# Patient Record
Sex: MALE | Race: WHITE | Employment: UNEMPLOYED | URBAN - METROPOLITAN AREA
[De-identification: names, ages, dates, MRNs, and addresses within clinical notes are randomized per-mention and may not be internally consistent; named-entity substitution may affect disease eponyms.]

---

## 2019-05-07 ENCOUNTER — OFFICE VISIT (OUTPATIENT)
Dept: INTERNAL MEDICINE CLINIC | Age: 49
End: 2019-05-07
Payer: COMMERCIAL

## 2019-05-07 VITALS
HEART RATE: 92 BPM | DIASTOLIC BLOOD PRESSURE: 86 MMHG | HEIGHT: 66 IN | SYSTOLIC BLOOD PRESSURE: 136 MMHG | WEIGHT: 200 LBS | BODY MASS INDEX: 32.14 KG/M2

## 2019-05-07 DIAGNOSIS — Z23 NEED FOR SHINGLES VACCINE: ICD-10-CM

## 2019-05-07 DIAGNOSIS — Z00.00 ANNUAL PHYSICAL EXAM: ICD-10-CM

## 2019-05-07 DIAGNOSIS — Z11.4 SCREENING FOR HIV (HUMAN IMMUNODEFICIENCY VIRUS): ICD-10-CM

## 2019-05-07 DIAGNOSIS — R20.0 NUMBNESS AND TINGLING IN LEFT HAND: ICD-10-CM

## 2019-05-07 DIAGNOSIS — Z76.89 ENCOUNTER TO ESTABLISH CARE: Primary | ICD-10-CM

## 2019-05-07 DIAGNOSIS — E55.9 VITAMIN D DEFICIENCY: ICD-10-CM

## 2019-05-07 DIAGNOSIS — F17.290 OTHER TOBACCO PRODUCT NICOTINE DEPENDENCE, UNCOMPLICATED: ICD-10-CM

## 2019-05-07 DIAGNOSIS — Z12.11 COLON CANCER SCREENING: ICD-10-CM

## 2019-05-07 DIAGNOSIS — R73.09 ELEVATED HEMOGLOBIN A1C: ICD-10-CM

## 2019-05-07 DIAGNOSIS — Z23 NEED FOR 23-POLYVALENT PNEUMOCOCCAL POLYSACCHARIDE VACCINE: ICD-10-CM

## 2019-05-07 DIAGNOSIS — R20.2 NUMBNESS AND TINGLING IN LEFT HAND: ICD-10-CM

## 2019-05-07 DIAGNOSIS — J44.9 CHRONIC OBSTRUCTIVE PULMONARY DISEASE, UNSPECIFIED COPD TYPE (HCC): ICD-10-CM

## 2019-05-07 DIAGNOSIS — J45.909 UNCOMPLICATED ASTHMA, UNSPECIFIED ASTHMA SEVERITY, UNSPECIFIED WHETHER PERSISTENT: ICD-10-CM

## 2019-05-07 LAB
A/G RATIO: 1.7 (ref 1.1–2.2)
ALBUMIN SERPL-MCNC: 4.7 G/DL (ref 3.4–5)
ALP BLD-CCNC: 90 U/L (ref 40–129)
ALT SERPL-CCNC: 25 U/L (ref 10–40)
ANION GAP SERPL CALCULATED.3IONS-SCNC: 13 MMOL/L (ref 3–16)
AST SERPL-CCNC: 21 U/L (ref 15–37)
BASOPHILS ABSOLUTE: 0.1 K/UL (ref 0–0.2)
BASOPHILS RELATIVE PERCENT: 0.9 %
BILIRUB SERPL-MCNC: 0.3 MG/DL (ref 0–1)
BUN BLDV-MCNC: 14 MG/DL (ref 7–20)
CALCIUM SERPL-MCNC: 10 MG/DL (ref 8.3–10.6)
CHLORIDE BLD-SCNC: 101 MMOL/L (ref 99–110)
CHOLESTEROL, TOTAL: 216 MG/DL (ref 0–199)
CO2: 27 MMOL/L (ref 21–32)
CREAT SERPL-MCNC: 1.3 MG/DL (ref 0.9–1.3)
EOSINOPHILS ABSOLUTE: 0.2 K/UL (ref 0–0.6)
EOSINOPHILS RELATIVE PERCENT: 2.3 %
GFR AFRICAN AMERICAN: >60
GFR NON-AFRICAN AMERICAN: 58
GLOBULIN: 2.8 G/DL
GLUCOSE BLD-MCNC: 104 MG/DL (ref 70–99)
HCT VFR BLD CALC: 48.6 % (ref 40.5–52.5)
HDLC SERPL-MCNC: 44 MG/DL (ref 40–60)
HEMOGLOBIN: 16.3 G/DL (ref 13.5–17.5)
LDL CHOLESTEROL CALCULATED: 129 MG/DL
LYMPHOCYTES ABSOLUTE: 2.1 K/UL (ref 1–5.1)
LYMPHOCYTES RELATIVE PERCENT: 24.6 %
MCH RBC QN AUTO: 32.5 PG (ref 26–34)
MCHC RBC AUTO-ENTMCNC: 33.4 G/DL (ref 31–36)
MCV RBC AUTO: 97.2 FL (ref 80–100)
MONOCYTES ABSOLUTE: 1.1 K/UL (ref 0–1.3)
MONOCYTES RELATIVE PERCENT: 12.8 %
NEUTROPHILS ABSOLUTE: 5 K/UL (ref 1.7–7.7)
NEUTROPHILS RELATIVE PERCENT: 59.4 %
PDW BLD-RTO: 13.8 % (ref 12.4–15.4)
PLATELET # BLD: 333 K/UL (ref 135–450)
PMV BLD AUTO: 9 FL (ref 5–10.5)
POTASSIUM SERPL-SCNC: 4.8 MMOL/L (ref 3.5–5.1)
RBC # BLD: 5 M/UL (ref 4.2–5.9)
SODIUM BLD-SCNC: 141 MMOL/L (ref 136–145)
TOTAL PROTEIN: 7.5 G/DL (ref 6.4–8.2)
TRIGL SERPL-MCNC: 214 MG/DL (ref 0–150)
TSH REFLEX: 1.04 UIU/ML (ref 0.27–4.2)
VLDLC SERPL CALC-MCNC: 43 MG/DL
WBC # BLD: 8.4 K/UL (ref 4–11)

## 2019-05-07 PROCEDURE — 90732 PPSV23 VACC 2 YRS+ SUBQ/IM: CPT | Performed by: NURSE PRACTITIONER

## 2019-05-07 PROCEDURE — 90750 HZV VACC RECOMBINANT IM: CPT | Performed by: NURSE PRACTITIONER

## 2019-05-07 PROCEDURE — 90472 IMMUNIZATION ADMIN EACH ADD: CPT | Performed by: NURSE PRACTITIONER

## 2019-05-07 PROCEDURE — 90471 IMMUNIZATION ADMIN: CPT | Performed by: NURSE PRACTITIONER

## 2019-05-07 PROCEDURE — 99386 PREV VISIT NEW AGE 40-64: CPT | Performed by: NURSE PRACTITIONER

## 2019-05-07 ASSESSMENT — PATIENT HEALTH QUESTIONNAIRE - PHQ9
SUM OF ALL RESPONSES TO PHQ QUESTIONS 1-9: 0
SUM OF ALL RESPONSES TO PHQ QUESTIONS 1-9: 0
2. FEELING DOWN, DEPRESSED OR HOPELESS: 0
1. LITTLE INTEREST OR PLEASURE IN DOING THINGS: 0
SUM OF ALL RESPONSES TO PHQ9 QUESTIONS 1 & 2: 0

## 2019-05-07 NOTE — PROGRESS NOTES
SUBJECTIVE:    Patient ID: Ada Mcneil is a 46 y.o. male. CC: New patient appointment, COPD    HPI: The patient presents to the office to establish with a new primary care provider. Previous PCP Dr. Gerard Woo  Last seen about 4-5 years ago. Diagnosed in the past with COPD. He was using inhalers, albuterol nebulizer and handheld. He states he was never on maintenance medication for COPD. Also diagnosed with asthma. He reports history of stroke but this is self-diagnosed. Had episode of dizziness. Had episode of difficulty speaking. Both occurred about 2 years ago. No deficits. Current has left #4-5 finger numbness  Numbness only in fingers. No history of neck issues. He awakened with numbness, no injury or trauma. According to medical records, has low vit D in past at 10 in 2010. A1c 6.5 in 2010. He is unemployed. He is single. He has 2 children. Smoking cigars. Had quit smoking for 4 years. No alcohol use. No illicit drug use. Past Medical History:   Diagnosis Date    Asthma     COPD (chronic obstructive pulmonary disease) ([facility] [address]         History reviewed. No pertinent surgical history.     Family History   Problem Relation Age of Onset    Heart Attack Father        Social History     Socioeconomic History    Marital status: Single     Spouse name: Not on file    Number of children: Not on file    Years of education: Not on file    Highest education level: Not on file   Occupational History    Not on file   Social Needs    Financial resource strain: Not on file    Food insecurity:     Worry: Not on file     Inability: Not on file    Transportation needs:     Medical: Not on file     Non-medical: Not on file   Tobacco Use    Smoking status: Current Every Day Smoker     Packs/day: 0.10     Types: Cigars    Smokeless tobacco: Never Used   Substance and Sexual Activity    Alcohol use: Not Currently    Drug use: Never    Sexual activity: Not Currently   Lifestyle    Physical activity:     Days per week: Not on file     Minutes per session: Not on file    Stress: Not on file   Relationships    Social connections:     Talks on phone: Not on file     Gets together: Not on file     Attends [employer] service: Not on file     Active member of club or organization: Not on file     Attends meetings of clubs or organizations: Not on file     Relationship status: Not on file    Intimate partner violence:     Fear of current or ex partner: Not on file     Emotionally abused: Not on file     Physically abused: Not on file     Forced sexual activity: Not on file   Other Topics Concern    Not on file   Social History Narrative    Not on file       No current outpatient medications on file prior to visit. No current facility-administered medications on file prior to visit. No Known Allergies         Review of Systems   Constitutional: Negative. Respiratory: Positive for choking and shortness of breath. Cardiovascular: Negative. Gastrointestinal: Negative. Genitourinary: Negative. Musculoskeletal: Negative. Skin: Negative. Neurological: Positive for numbness. Psychiatric/Behavioral: Negative. All other systems reviewed and are negative. OBJECTIVE:  Physical Exam   Constitutional: He is oriented to person, place, and time. He appears well-developed and well-nourished. No distress. HENT:   Head: Normocephalic and atraumatic. Eyes: Conjunctivae are normal. No scleral icterus. Neck: Neck supple. No JVD present. Cardiovascular: Normal rate and regular rhythm. Pulmonary/Chest: Effort normal and breath sounds normal. No respiratory distress. He has no wheezes. He has no rales. Abdominal: Soft. He exhibits no distension. There is no tenderness. There is no rebound and no guarding. Musculoskeletal: Normal range of motion. He exhibits no edema. Neurological: He is alert and oriented to person, place, and time. Skin: Skin is warm and dry. He is not diaphoretic. Psychiatric: He has a normal mood and affect. His behavior is normal. Thought content normal.        /86   Pulse 92   Ht 5' 6\" (1.676 m)   Wt 200 lb (90.7 kg)   BMI 32.28 kg/m²        PHQ Scores 5/7/2019   PHQ2 Score 0   PHQ9 Score 0     Interpretation of Total Score DepressionSeverity: 1-4 = Minimal depression, 5-9 = Mild depression, 10-14 = Moderate depression, 15-19 = Moderately severe depression, 20-27 = Severe depression         ASSESSMENT/PLAN:  Lanie Marie was seen today for established new doctor.   Diagnoses and all orders for this visit:    Encounter to establish care  - H&P reviewed and scanned into EMR  - Oriented to provider & practice    Annual physical exam  -     Comprehensive Metabolic Panel  -     CBC WITH AUTO DIFFERENTIAL  -     LIPID PANEL  -     Hemoglobin A1C    Elevated hemoglobin A1c  -     Hemoglobin A1C    Vitamin D deficiency  -     Vitamin D 25 Hydroxy    Other tobacco product nicotine dependence, uncomplicated  -     Full PFT Study With Bronchodilator    Uncomplicated asthma, unspecified asthma severity, unspecified whether persistent  -     Full PFT Study With Bronchodilator    Chronic obstructive pulmonary disease, unspecified COPD type ([facility] [address]  -     Full PFT Study With Bronchodilator    Screening for HIV (human immunodeficiency virus)  -     Cancel: HIV Screen    Colon cancer screening  -     COLONOSCOPY (Screening)    Numbness and tingling in left hand  -     TSH with Reflex  -     VITAMIN B12 & FOLATE    Need for 23-polyvalent pneumococcal polysaccharide vaccine  -     PNEUMOVAX 23 subcutaneous/IM (Pneumococcal polysaccharide vaccine 23-valent >= 1yo)    Need for shingles vaccine  -     Zoster recombinant [facility])    Other orders  -     MISC ARUP 1        ORLANDO Hoyt - CNP

## 2019-05-08 DIAGNOSIS — E78.5 DYSLIPIDEMIA: Primary | ICD-10-CM

## 2019-05-08 DIAGNOSIS — E55.9 VITAMIN D DEFICIENCY: ICD-10-CM

## 2019-05-08 LAB
ESTIMATED AVERAGE GLUCOSE: 114 MG/DL
FOLATE: 11.98 NG/ML (ref 4.78–24.2)
HBA1C MFR BLD: 5.6 %
VITAMIN B-12: 286 PG/ML (ref 211–911)
VITAMIN D 25-HYDROXY: 22.4 NG/ML

## 2019-05-08 RX ORDER — MELATONIN
1000 DAILY
Qty: 30 TABLET | Refills: 5 | Status: SHIPPED | OUTPATIENT
Start: 2019-05-08 | End: 2019-08-06 | Stop reason: SDUPTHER

## 2019-05-08 RX ORDER — ATORVASTATIN CALCIUM 20 MG/1
20 TABLET, FILM COATED ORAL NIGHTLY
Qty: 30 TABLET | Refills: 5 | Status: SHIPPED | OUTPATIENT
Start: 2019-05-08 | End: 2020-01-16

## 2019-05-09 LAB — MISCELLANEOUS LAB TEST ORDER: NORMAL

## 2019-05-09 ASSESSMENT — ENCOUNTER SYMPTOMS
GASTROINTESTINAL NEGATIVE: 1
CHOKING: 1
SHORTNESS OF BREATH: 1

## 2019-05-10 ENCOUNTER — HOSPITAL ENCOUNTER (OUTPATIENT)
Dept: PULMONOLOGY | Age: 49
Discharge: HOME | End: 2019-05-10
Payer: COMMERCIAL

## 2019-05-10 VITALS — OXYGEN SATURATION: 92 %

## 2019-05-10 PROCEDURE — 94729 DIFFUSING CAPACITY: CPT

## 2019-05-10 PROCEDURE — 94010 BREATHING CAPACITY TEST: CPT

## 2019-05-10 PROCEDURE — 6370000000 HC RX 637 (ALT 250 FOR IP): Performed by: NURSE PRACTITIONER

## 2019-05-10 PROCEDURE — 94760 N-INVAS EAR/PLS OXIMETRY 1: CPT

## 2019-05-10 PROCEDURE — 94060 EVALUATION OF WHEEZING: CPT

## 2019-05-10 PROCEDURE — 94200 LUNG FUNCTION TEST (MBC/MVV): CPT

## 2019-05-10 PROCEDURE — 94664 DEMO&/EVAL PT USE INHALER: CPT

## 2019-05-10 PROCEDURE — 94726 PLETHYSMOGRAPHY LUNG VOLUMES: CPT

## 2019-05-10 RX ORDER — ALBUTEROL SULFATE 90 UG/1
4 AEROSOL, METERED RESPIRATORY (INHALATION) ONCE
Status: COMPLETED | OUTPATIENT
Start: 2019-05-10 | End: 2019-05-10

## 2019-05-10 RX ADMIN — Medication 4 PUFF: at 14:12

## 2019-05-13 NOTE — PROCEDURES
Pulmonary Function Testing      Patient name:  Jacqueline Dela Cruz     [facility] Unit #:   [id number]   Date of test:  5/10/2019  Date of interpretation:   5/13/2019    Mr. Jacqueline Dela Cruz is a 46y.o. year-old current smoker. The spirometry data were acceptable and reproducible. Spirometry:  Flow volume loops were obstructed. The FEV-1/FVC ratio was decreased. The FEV-1 was 0.89 liters (25% of predicted), which was very severely decreased. The FVC was 2.07 liters (45% of predicted), which was decreased. Response to inhaled bronchodilators (albuterol) was significant. Lung volumes:  Lung volumes were tested by plethysmography. The total lung capacity was 6.71 liters (110% of predicted), which was normal. The residual volume was 3.68 liters (194% of predicted), which was increased. The ratio of residual volume to total lung capacity (RV/TLC) was 58, which was increased. Diffusion capacity was found to be severely decreased. Interpretation:  Very severe obstructive airway disease with significant bronchodilator reversibility.     Comments:

## 2019-05-15 DIAGNOSIS — J44.9 CHRONIC OBSTRUCTIVE PULMONARY DISEASE, UNSPECIFIED COPD TYPE (HCC): Primary | ICD-10-CM

## 2019-05-15 DIAGNOSIS — J45.50 SEVERE PERSISTENT ASTHMA WITHOUT COMPLICATION: ICD-10-CM

## 2019-05-15 RX ORDER — FLUTICASONE FUROATE AND VILANTEROL 100; 25 UG/1; UG/1
1 POWDER RESPIRATORY (INHALATION) DAILY
Qty: 30 EACH | Refills: 5 | Status: SHIPPED | OUTPATIENT
Start: 2019-05-15 | End: 2019-11-05 | Stop reason: SDUPTHER

## 2019-08-06 ENCOUNTER — OFFICE VISIT (OUTPATIENT)
Dept: INTERNAL MEDICINE CLINIC | Age: 49
End: 2019-08-06
Payer: COMMERCIAL

## 2019-08-06 VITALS
HEIGHT: 66 IN | BODY MASS INDEX: 32.3 KG/M2 | SYSTOLIC BLOOD PRESSURE: 114 MMHG | DIASTOLIC BLOOD PRESSURE: 72 MMHG | WEIGHT: 201 LBS | HEART RATE: 84 BPM

## 2019-08-06 DIAGNOSIS — E55.9 VITAMIN D DEFICIENCY: ICD-10-CM

## 2019-08-06 DIAGNOSIS — J44.9 CHRONIC OBSTRUCTIVE PULMONARY DISEASE, UNSPECIFIED COPD TYPE (HCC): Primary | ICD-10-CM

## 2019-08-06 DIAGNOSIS — Z23 NEED FOR SHINGLES VACCINE: ICD-10-CM

## 2019-08-06 DIAGNOSIS — E78.5 DYSLIPIDEMIA: ICD-10-CM

## 2019-08-06 PROCEDURE — G8417 CALC BMI ABV UP PARAM F/U: HCPCS | Performed by: NURSE PRACTITIONER

## 2019-08-06 PROCEDURE — 4004F PT TOBACCO SCREEN RCVD TLK: CPT | Performed by: NURSE PRACTITIONER

## 2019-08-06 PROCEDURE — 90750 HZV VACC RECOMBINANT IM: CPT | Performed by: NURSE PRACTITIONER

## 2019-08-06 PROCEDURE — 3017F COLORECTAL CA SCREEN DOC REV: CPT | Performed by: NURSE PRACTITIONER

## 2019-08-06 PROCEDURE — G8926 SPIRO NO PERF OR DOC: HCPCS | Performed by: NURSE PRACTITIONER

## 2019-08-06 PROCEDURE — 90471 IMMUNIZATION ADMIN: CPT | Performed by: NURSE PRACTITIONER

## 2019-08-06 PROCEDURE — 3023F SPIROM DOC REV: CPT | Performed by: NURSE PRACTITIONER

## 2019-08-06 PROCEDURE — G8427 DOCREV CUR MEDS BY ELIG CLIN: HCPCS | Performed by: NURSE PRACTITIONER

## 2019-08-06 PROCEDURE — 99214 OFFICE O/P EST MOD 30 MIN: CPT | Performed by: NURSE PRACTITIONER

## 2019-08-06 RX ORDER — MELATONIN
2000 DAILY
Qty: 60 TABLET | Refills: 5 | Status: SHIPPED | OUTPATIENT
Start: 2019-08-06 | End: 2020-01-16

## 2019-08-08 ASSESSMENT — ENCOUNTER SYMPTOMS
RESPIRATORY NEGATIVE: 1
GASTROINTESTINAL NEGATIVE: 1

## 2019-11-05 DIAGNOSIS — J44.9 CHRONIC OBSTRUCTIVE PULMONARY DISEASE, UNSPECIFIED COPD TYPE (HCC): ICD-10-CM

## 2019-11-05 DIAGNOSIS — J45.50 SEVERE PERSISTENT ASTHMA WITHOUT COMPLICATION: ICD-10-CM

## 2019-11-05 RX ORDER — FLUTICASONE FUROATE AND VILANTEROL TRIFENATATE 100; 25 UG/1; UG/1
POWDER RESPIRATORY (INHALATION)
Qty: 60 EACH | Refills: 5 | Status: SHIPPED | OUTPATIENT
Start: 2019-11-05 | End: 2020-03-02 | Stop reason: SDUPTHER

## 2019-11-28 DIAGNOSIS — E55.9 VITAMIN D DEFICIENCY: ICD-10-CM

## 2019-12-02 RX ORDER — MELATONIN
Qty: 30 TABLET | Refills: 5 | Status: SHIPPED | OUTPATIENT
Start: 2019-12-02 | End: 2020-01-13 | Stop reason: DRUGHIGH

## 2020-01-13 ENCOUNTER — OFFICE VISIT (OUTPATIENT)
Dept: INTERNAL MEDICINE CLINIC | Age: 50
End: 2020-01-13
Payer: COMMERCIAL

## 2020-01-13 VITALS
BODY MASS INDEX: 32.78 KG/M2 | HEART RATE: 96 BPM | SYSTOLIC BLOOD PRESSURE: 138 MMHG | DIASTOLIC BLOOD PRESSURE: 82 MMHG | WEIGHT: 204 LBS | HEIGHT: 66 IN

## 2020-01-13 PROCEDURE — G8484 FLU IMMUNIZE NO ADMIN: HCPCS | Performed by: NURSE PRACTITIONER

## 2020-01-13 PROCEDURE — 99396 PREV VISIT EST AGE 40-64: CPT | Performed by: NURSE PRACTITIONER

## 2020-01-13 RX ORDER — BUDESONIDE AND FORMOTEROL FUMARATE DIHYDRATE 160; 4.5 UG/1; UG/1
2 AEROSOL RESPIRATORY (INHALATION) 2 TIMES DAILY
Qty: 3 INHALER | Refills: 3 | Status: SHIPPED | OUTPATIENT
Start: 2020-01-13 | End: 2020-03-02 | Stop reason: ALTCHOICE

## 2020-01-13 SDOH — ECONOMIC STABILITY: TRANSPORTATION INSECURITY
IN THE PAST 12 MONTHS, HAS THE LACK OF TRANSPORTATION KEPT YOU FROM MEDICAL APPOINTMENTS OR FROM GETTING MEDICATIONS?: NO

## 2020-01-13 SDOH — ECONOMIC STABILITY: TRANSPORTATION INSECURITY
IN THE PAST 12 MONTHS, HAS LACK OF TRANSPORTATION KEPT YOU FROM MEETINGS, WORK, OR FROM GETTING THINGS NEEDED FOR DAILY LIVING?: NO

## 2020-01-13 SDOH — ECONOMIC STABILITY: FOOD INSECURITY: WITHIN THE PAST 12 MONTHS, THE FOOD YOU BOUGHT JUST DIDN'T LAST AND YOU DIDN'T HAVE MONEY TO GET MORE.: NEVER TRUE

## 2020-01-13 SDOH — ECONOMIC STABILITY: INCOME INSECURITY: HOW HARD IS IT FOR YOU TO PAY FOR THE VERY BASICS LIKE FOOD, HOUSING, MEDICAL CARE, AND HEATING?: NOT HARD AT ALL

## 2020-01-13 SDOH — ECONOMIC STABILITY: FOOD INSECURITY: WITHIN THE PAST 12 MONTHS, YOU WORRIED THAT YOUR FOOD WOULD RUN OUT BEFORE YOU GOT MONEY TO BUY MORE.: NEVER TRUE

## 2020-01-13 ASSESSMENT — PATIENT HEALTH QUESTIONNAIRE - PHQ9
SUM OF ALL RESPONSES TO PHQ9 QUESTIONS 1 & 2: 0
2. FEELING DOWN, DEPRESSED OR HOPELESS: 0
1. LITTLE INTEREST OR PLEASURE IN DOING THINGS: 0
SUM OF ALL RESPONSES TO PHQ QUESTIONS 1-9: 0
SUM OF ALL RESPONSES TO PHQ QUESTIONS 1-9: 0

## 2020-01-13 NOTE — PROGRESS NOTES
SUBJECTIVE:    Patient ID: Soy Felipe is a 44 y.o. male. CC: Annual physical, COPD, hyperlipidemia, impaired fasting glucose, vitamin D deficiency    HPI: The patient presents to the office today for annual physical and for follow-up of chronic medical conditions. He has been having episodes of indigestion. Indigestion does not seem to be associated with eating. He denies any overt chest pain. He denies exertional component. There is no associated shortness of breath. Fatigue: He has been experiencing fatigue of late. He denies any known cause. He feels he is sleeping well. COPD: Since starting medication, he feels this is better controlled. Unfortunately, [facility] is no longer covered under his plan. We discussed switching him to Symbicort. Hyperlipidemia: He has a history of hyperlipidemia. He denies chest pain. Impaired fasting glucose: He has a history of well-controlled diabetes back in 2010 with an A1c of 6.5. More recently, his A1c was 5.6 last year. He has a history of vitamin D deficiency. He has been taking vitamin D repletion. Past Medical History:   Diagnosis Date    Asthma     COPD (chronic obstructive pulmonary disease) ([facility])         Current Outpatient Medications   Medication Sig Dispense Refill    BREO ELLIPTA 100-25 MCG/INH AEPB inhaler TAKE 1 PUFF BY MOUTH EVERY DAY 60 each 5    Cholecalciferol (VITAMIN D3) 1000 units TABS Take 2 tablets by mouth daily 60 tablet 5    atorvastatin (LIPITOR) 20 MG tablet Take 1 tablet by mouth nightly 30 tablet 5     No current facility-administered medications for this visit. Review of Systems   Constitutional: Positive for fatigue. Negative for chills and fever. Respiratory: Negative for shortness of breath. Cardiovascular: Negative for chest pain. Gastrointestinal: Negative. Indigestion   Genitourinary: Negative. Musculoskeletal: Negative. Skin: Negative. Neurological: Negative. OBJECTIVE:  Physical Exam  Vitals signs reviewed. Constitutional:       General: He is not in acute distress. Appearance: He is well-developed. He is not diaphoretic. HENT:      Head: Normocephalic and atraumatic. Eyes:      General: No scleral icterus. Conjunctiva/sclera: Conjunctivae normal.   Neck:      Musculoskeletal: Neck supple. Vascular: No JVD. Cardiovascular:      Rate and Rhythm: Normal rate and regular rhythm. Pulmonary:      Effort: Pulmonary effort is normal. No respiratory distress. Breath sounds: Normal breath sounds. No wheezing or rales. Abdominal:      General: There is no distension. Palpations: Abdomen is soft. Tenderness: There is no tenderness. There is no guarding or rebound. Musculoskeletal: Normal range of motion. Skin:     General: Skin is warm and dry. Neurological:      Mental Status: He is alert and oriented to person, place, and time. Psychiatric:         Behavior: Behavior normal.         Thought Content: Thought content normal.        /82   Pulse 96   Ht 5' 6\" (1.676 m)   Wt 204 lb (92.5 kg)   BMI 32.93 kg/m²      PHQ Scores 1/13/2020 5/7/2019   PHQ2 Score 0 0   PHQ9 Score 0 0     Interpretation of Total Score Depression Severity: 1-4 = Minimal depression, 5-9 = Mild depression, 10-14 = Moderate depression, 15-19 = Moderately severe depression, 20-27 =Severe depression        ASSESSMENT/PLAN:  Nimesh Kemp was seen today for gastroesophageal reflux. Diagnoses and all orders for this visit:    Annual physical exam  -     Comprehensive Metabolic Panel; Future  -     Hemoglobin A1C; Future  -     LIPID PANEL; Future  -     Vitamin D 25 Hydroxy; Future  -     CBC WITH AUTO DIFFERENTIAL; Future    Indigestion  - Recent episode of what he describes as indigestion which was not explained by food. Given history of hyperlipidemia and tobacco abuse, will screen for coronary disease with troponin and EKG. He denies overt chest pain.

## 2020-01-14 ENCOUNTER — HOSPITAL ENCOUNTER (OUTPATIENT)
Age: 50
Discharge: HOME | End: 2020-01-14
Payer: COMMERCIAL

## 2020-01-14 DIAGNOSIS — E78.5 DYSLIPIDEMIA: ICD-10-CM

## 2020-01-14 DIAGNOSIS — K30 INDIGESTION: ICD-10-CM

## 2020-01-14 DIAGNOSIS — R53.83 FATIGUE, UNSPECIFIED TYPE: ICD-10-CM

## 2020-01-14 DIAGNOSIS — E55.9 VITAMIN D DEFICIENCY: ICD-10-CM

## 2020-01-14 DIAGNOSIS — Z11.4 SCREENING FOR HIV (HUMAN IMMUNODEFICIENCY VIRUS): ICD-10-CM

## 2020-01-14 DIAGNOSIS — Z00.00 ANNUAL PHYSICAL EXAM: ICD-10-CM

## 2020-01-14 PROBLEM — F17.200 NICOTINE DEPENDENCE: Status: ACTIVE | Noted: 2020-01-14

## 2020-01-14 PROBLEM — R73.01 IMPAIRED FASTING GLUCOSE: Status: ACTIVE | Noted: 2020-01-14

## 2020-01-14 PROBLEM — J44.9 CHRONIC OBSTRUCTIVE PULMONARY DISEASE (HCC): Status: ACTIVE | Noted: 2020-01-14

## 2020-01-14 LAB
A/G RATIO: 1.6 (ref 1.1–2.2)
ALBUMIN SERPL-MCNC: 4.6 G/DL (ref 3.4–5)
ALP BLD-CCNC: 90 U/L (ref 40–129)
ALT SERPL-CCNC: 28 U/L (ref 10–40)
ANION GAP SERPL CALCULATED.3IONS-SCNC: 17 MMOL/L (ref 3–16)
AST SERPL-CCNC: 24 U/L (ref 15–37)
BASOPHILS ABSOLUTE: 0.1 K/UL (ref 0–0.2)
BASOPHILS RELATIVE PERCENT: 1.3 %
BILIRUB SERPL-MCNC: 0.5 MG/DL (ref 0–1)
BUN BLDV-MCNC: 10 MG/DL (ref 7–20)
CALCIUM SERPL-MCNC: 10.2 MG/DL (ref 8.3–10.6)
CHLORIDE BLD-SCNC: 99 MMOL/L (ref 99–110)
CHOLESTEROL, TOTAL: 216 MG/DL (ref 0–199)
CO2: 25 MMOL/L (ref 21–32)
CREAT SERPL-MCNC: 1.2 MG/DL (ref 0.9–1.3)
EKG ATRIAL RATE: 79 BPM
EKG DIAGNOSIS: NORMAL
EKG P AXIS: 78 DEGREES
EKG P-R INTERVAL: 166 MS
EKG Q-T INTERVAL: 346 MS
EKG QRS DURATION: 76 MS
EKG QTC CALCULATION (BAZETT): 396 MS
EKG R AXIS: 38 DEGREES
EKG T AXIS: 206 DEGREES
EKG VENTRICULAR RATE: 79 BPM
EOSINOPHILS ABSOLUTE: 0.2 K/UL (ref 0–0.6)
EOSINOPHILS RELATIVE PERCENT: 2.2 %
GFR AFRICAN AMERICAN: >60
GFR NON-AFRICAN AMERICAN: >60
GLOBULIN: 2.9 G/DL
GLUCOSE BLD-MCNC: 104 MG/DL (ref 70–99)
HCT VFR BLD CALC: 52.3 % (ref 40.5–52.5)
HDLC SERPL-MCNC: 48 MG/DL (ref 40–60)
HEMOGLOBIN: 17.5 G/DL (ref 13.5–17.5)
LDL CHOLESTEROL CALCULATED: 133 MG/DL
LYMPHOCYTES ABSOLUTE: 2.5 K/UL (ref 1–5.1)
LYMPHOCYTES RELATIVE PERCENT: 28.1 %
MCH RBC QN AUTO: 31.4 PG (ref 26–34)
MCHC RBC AUTO-ENTMCNC: 33.4 G/DL (ref 31–36)
MCV RBC AUTO: 94.1 FL (ref 80–100)
MONOCYTES ABSOLUTE: 0.9 K/UL (ref 0–1.3)
MONOCYTES RELATIVE PERCENT: 10.7 %
NEUTROPHILS ABSOLUTE: 5.1 K/UL (ref 1.7–7.7)
NEUTROPHILS RELATIVE PERCENT: 57.7 %
PDW BLD-RTO: 14.3 % (ref 12.4–15.4)
PLATELET # BLD: 378 K/UL (ref 135–450)
PMV BLD AUTO: 8.6 FL (ref 5–10.5)
POTASSIUM SERPL-SCNC: 4.9 MMOL/L (ref 3.5–5.1)
RBC # BLD: 5.56 M/UL (ref 4.2–5.9)
SODIUM BLD-SCNC: 141 MMOL/L (ref 136–145)
TOTAL PROTEIN: 7.5 G/DL (ref 6.4–8.2)
TRIGL SERPL-MCNC: 173 MG/DL (ref 0–150)
TROPONIN: <0.01 NG/ML
VITAMIN D 25-HYDROXY: 27.8 NG/ML
VLDLC SERPL CALC-MCNC: 35 MG/DL
WBC # BLD: 8.8 K/UL (ref 4–11)

## 2020-01-14 PROCEDURE — 93010 ELECTROCARDIOGRAM REPORT: CPT | Performed by: INTERNAL MEDICINE

## 2020-01-14 PROCEDURE — 93005 ELECTROCARDIOGRAM TRACING: CPT | Performed by: NURSE PRACTITIONER

## 2020-01-14 ASSESSMENT — ENCOUNTER SYMPTOMS
GASTROINTESTINAL NEGATIVE: 1
SHORTNESS OF BREATH: 0

## 2020-01-15 LAB
ESTIMATED AVERAGE GLUCOSE: 122.6 MG/DL
HBA1C MFR BLD: 5.9 %
HIV AG/AB: NORMAL
HIV ANTIGEN: NORMAL
HIV-1 ANTIBODY: NORMAL
HIV-2 AB: NORMAL

## 2020-01-16 LAB
CONTROL: NORMAL
HEMOCCULT STL QL: NORMAL

## 2020-01-16 PROCEDURE — 82274 ASSAY TEST FOR BLOOD FECAL: CPT | Performed by: NURSE PRACTITIONER

## 2020-01-16 RX ORDER — ATORVASTATIN CALCIUM 40 MG/1
40 TABLET, FILM COATED ORAL NIGHTLY
Qty: 30 TABLET | Refills: 5 | Status: SHIPPED | OUTPATIENT
Start: 2020-01-16 | End: 2020-07-20

## 2020-01-16 RX ORDER — MELATONIN
3000 DAILY
Qty: 90 TABLET | Refills: 5 | Status: SHIPPED | OUTPATIENT
Start: 2020-01-16 | End: 2020-06-15

## 2020-02-04 ENCOUNTER — OFFICE VISIT (OUTPATIENT)
Dept: INTERNAL MEDICINE CLINIC | Age: 50
End: 2020-02-04
Payer: COMMERCIAL

## 2020-02-04 VITALS
HEIGHT: 67 IN | BODY MASS INDEX: 31.08 KG/M2 | WEIGHT: 198 LBS | DIASTOLIC BLOOD PRESSURE: 86 MMHG | HEART RATE: 90 BPM | SYSTOLIC BLOOD PRESSURE: 140 MMHG

## 2020-02-04 PROCEDURE — 3017F COLORECTAL CA SCREEN DOC REV: CPT | Performed by: NURSE PRACTITIONER

## 2020-02-04 PROCEDURE — 4004F PT TOBACCO SCREEN RCVD TLK: CPT | Performed by: NURSE PRACTITIONER

## 2020-02-04 PROCEDURE — 99214 OFFICE O/P EST MOD 30 MIN: CPT | Performed by: NURSE PRACTITIONER

## 2020-02-04 PROCEDURE — G8484 FLU IMMUNIZE NO ADMIN: HCPCS | Performed by: NURSE PRACTITIONER

## 2020-02-04 PROCEDURE — G8417 CALC BMI ABV UP PARAM F/U: HCPCS | Performed by: NURSE PRACTITIONER

## 2020-02-04 PROCEDURE — G8926 SPIRO NO PERF OR DOC: HCPCS | Performed by: NURSE PRACTITIONER

## 2020-02-04 PROCEDURE — G8427 DOCREV CUR MEDS BY ELIG CLIN: HCPCS | Performed by: NURSE PRACTITIONER

## 2020-02-04 PROCEDURE — 3023F SPIROM DOC REV: CPT | Performed by: NURSE PRACTITIONER

## 2020-02-04 RX ORDER — PANTOPRAZOLE SODIUM 40 MG/1
40 TABLET, DELAYED RELEASE ORAL DAILY
Qty: 30 TABLET | Refills: 2 | Status: SHIPPED | OUTPATIENT
Start: 2020-02-04 | End: 2020-04-27

## 2020-02-05 ASSESSMENT — ENCOUNTER SYMPTOMS: RESPIRATORY NEGATIVE: 1

## 2020-02-05 NOTE — PROGRESS NOTES
SUBJECTIVE:    Patient ID: Marcelino Royal is a 44 y.o. male. CC: Indigestion, COPD, hyperlipidemia prediabetes    HPI: The patient presents to the office today for follow-up of chronic medical conditions. Indigestion: Patient previously reported indigestion which did not seem to be associated with eating. He now feels this may be related to food. Worse when he lays down. We discussed GERD. He continues to have atypical chest pain which we have discussed may be related to above. There is no associated overt chest pain or exertional symptoms. .  He would like to proceed with cardiac stress testing. COPD: Patient felt better when he was taking Breo. Unfortunately this was no longer covered by his plan so we switched him to Symbicort. He is frustrated because he is now been told Symbicort is not covered either. He denies any recent exacerbation. Hyperlipidemia: He denies any chest pain or shortness of breath. He was started on a statin for increased ASCVD risk. Prediabetes: He has a history of prediabetes. This remains stable with his most recent A1c 5.9. Past Medical History:   Diagnosis Date    Asthma     COPD [facility]chronic obstructive pulmonary disease) ([facility]         Current Outpatient Medications   Medication Sig Dispense Refill    pantoprazole (PROTONIX) 40 MG tablet Take 1 tablet by mouth daily 30 tablet 2    Cholecalciferol (VITAMIN D3) 25 MCG (1000 UT) TABS Take 3 tablets by mouth daily 90 tablet 5    atorvastatin (LIPITOR) 40 MG tablet Take 1 tablet by mouth nightly 30 tablet 5    budesonide-formoterol (SYMBICORT) 160-4.5 MCG/ACT AERO Inhale 2 puffs into the lungs 2 times daily 3 Inhaler 3    BREO ELLIPTA 100-25 MCG/INH AEPB inhaler TAKE 1 PUFF BY MOUTH EVERY DAY 60 each 5     No current facility-administered medications for this visit. Review of Systems   Constitutional: Negative. Respiratory: Negative. Cardiovascular: Positive for chest pain. improved on statin  -Continue current regimen    Vitamin D deficiency  -History of vitamin D deficiency  -Continue repletion and monitoring    Chronic obstructive pulmonary disease, unspecified COPD type ([facility] [address]  -See HPI  -We will await contact from insurance or pharmacy if Symbicort is not covered    Impaired fasting glucose  -Prediabetes remains stable  -Monitor      Alondra Cruz, APRN - CNP

## 2020-03-02 ENCOUNTER — NURSE TRIAGE (OUTPATIENT)
Dept: OTHER | Facility: CLINIC | Age: 50
End: 2020-03-02

## 2020-03-02 ENCOUNTER — OFFICE VISIT (OUTPATIENT)
Dept: INTERNAL MEDICINE CLINIC | Age: 50
End: 2020-03-02
Payer: COMMERCIAL

## 2020-03-02 VITALS
DIASTOLIC BLOOD PRESSURE: 64 MMHG | SYSTOLIC BLOOD PRESSURE: 136 MMHG | HEART RATE: 86 BPM | WEIGHT: 204 LBS | BODY MASS INDEX: 31.76 KG/M2

## 2020-03-02 PROCEDURE — G8427 DOCREV CUR MEDS BY ELIG CLIN: HCPCS | Performed by: NURSE PRACTITIONER

## 2020-03-02 PROCEDURE — 3017F COLORECTAL CA SCREEN DOC REV: CPT | Performed by: NURSE PRACTITIONER

## 2020-03-02 PROCEDURE — 4004F PT TOBACCO SCREEN RCVD TLK: CPT | Performed by: NURSE PRACTITIONER

## 2020-03-02 PROCEDURE — G8417 CALC BMI ABV UP PARAM F/U: HCPCS | Performed by: NURSE PRACTITIONER

## 2020-03-02 PROCEDURE — G8926 SPIRO NO PERF OR DOC: HCPCS | Performed by: NURSE PRACTITIONER

## 2020-03-02 PROCEDURE — G8484 FLU IMMUNIZE NO ADMIN: HCPCS | Performed by: NURSE PRACTITIONER

## 2020-03-02 PROCEDURE — 99213 OFFICE O/P EST LOW 20 MIN: CPT | Performed by: NURSE PRACTITIONER

## 2020-03-02 PROCEDURE — 3023F SPIROM DOC REV: CPT | Performed by: NURSE PRACTITIONER

## 2020-03-02 RX ORDER — ALBUTEROL SULFATE 90 UG/1
2 AEROSOL, METERED RESPIRATORY (INHALATION) EVERY 4 HOURS PRN
Qty: 1 INHALER | Refills: 5 | Status: SHIPPED | OUTPATIENT
Start: 2020-03-02 | End: 2020-10-15

## 2020-03-02 RX ORDER — FLUTICASONE FUROATE AND VILANTEROL 100; 25 UG/1; UG/1
1 POWDER RESPIRATORY (INHALATION) DAILY
Qty: 90 EACH | Refills: 3 | Status: SHIPPED | OUTPATIENT
Start: 2020-03-02 | End: 2020-09-01 | Stop reason: SDUPTHER

## 2020-03-02 NOTE — PROGRESS NOTES
is warm and dry. Neurological:      General: No focal deficit present. Mental Status: He is alert and oriented to person, place, and time. Psychiatric:         Mood and Affect: Mood normal.         Behavior: Behavior normal.        /64   Pulse 86   Wt 204 lb (92.5 kg)   BMI 31.76 kg/m²      PHQ Scores 1/13/2020 5/7/2019   PHQ2 Score 0 0   PHQ9 Score 0 0     Interpretation of Total Score Depression Severity: 1-4 = Minimal depression, 5-9 = Mild depression, 10-14 = Moderate depression, 15-19 = Moderately severe depression, 20-27 =Severe depression        ASSESSMENT/PLAN:  Domenico Houston was seen today for other. Diagnoses and all orders for this visit:    Chronic obstructive pulmonary disease, unspecified COPD type ([facility] [address]  -     fluticasone-vilanterol (BREO ELLIPTA) 100-25 MCG/INH AEPB inhaler; Inhale 1 puff into the lungs daily  -     albuterol sulfate HFA (PROVENTIL HFA) 108 (90 Base) MCG/ACT inhaler; Inhale 2 puffs into the lungs every 4 hours as needed for Wheezing or Shortness of Breath    Severe persistent asthma without complication  -     fluticasone-vilanterol (BREO ELLIPTA) 100-25 MCG/INH AEPB inhaler; Inhale 1 puff into the lungs daily  -     albuterol sulfate HFA (PROVENTIL HFA) 108 (90 Base) MCG/ACT inhaler; Inhale 2 puffs into the lungs every 4 hours as needed for Wheezing or Shortness of Breath    -See HPI  -Back to Breo given poor control on Symbicort and insurance company changing formulary again.       Willie Chanel, APRN - CNP

## 2020-03-03 ASSESSMENT — ENCOUNTER SYMPTOMS
SHORTNESS OF BREATH: 1
CHEST TIGHTNESS: 1
WHEEZING: 1

## 2020-04-27 RX ORDER — PANTOPRAZOLE SODIUM 40 MG/1
TABLET, DELAYED RELEASE ORAL
Qty: 30 TABLET | Refills: 2 | Status: SHIPPED | OUTPATIENT
Start: 2020-04-27 | End: 2020-08-03

## 2020-06-15 RX ORDER — UBIDECARENONE 100 MG
CAPSULE ORAL
Qty: 30 CAPSULE | Refills: 5 | Status: ON HOLD | OUTPATIENT
Start: 2020-06-15 | End: 2022-03-19 | Stop reason: ALTCHOICE

## 2020-06-25 ENCOUNTER — TELEPHONE (OUTPATIENT)
Dept: ADMINISTRATIVE | Age: 50
End: 2020-06-25

## 2020-07-20 RX ORDER — ATORVASTATIN CALCIUM 40 MG/1
TABLET, FILM COATED ORAL
Qty: 30 TABLET | Refills: 5 | Status: SHIPPED | OUTPATIENT
Start: 2020-07-20

## 2020-08-03 RX ORDER — PANTOPRAZOLE SODIUM 40 MG/1
TABLET, DELAYED RELEASE ORAL
Qty: 30 TABLET | Refills: 5 | Status: SHIPPED | OUTPATIENT
Start: 2020-08-03 | End: 2021-07-08 | Stop reason: ALTCHOICE

## 2020-09-01 ENCOUNTER — TELEPHONE (OUTPATIENT)
Dept: INTERNAL MEDICINE CLINIC | Age: 50
End: 2020-09-01

## 2020-09-01 RX ORDER — FLUTICASONE FUROATE AND VILANTEROL TRIFENATATE 100; 25 UG/1; UG/1
1 POWDER RESPIRATORY (INHALATION) DAILY
Qty: 3 EACH | Refills: 2 | Status: SHIPPED | OUTPATIENT
Start: 2020-09-01 | End: 2021-05-24

## 2020-09-01 NOTE — TELEPHONE ENCOUNTER
Patient states his pharmacy requested refills of fluticasone-vilanterol (BREO ELLIPTA) 100-25 MCG/INH AEPB inhaler for him and the prescription was denied. Patient states he is out of medication and needs a prescription sent to [facility] on [address]. He is requesting a call back.

## 2020-10-12 ENCOUNTER — NURSE TRIAGE (OUTPATIENT)
Dept: OTHER | Facility: CLINIC | Age: 50
End: 2020-10-12

## 2020-10-12 ENCOUNTER — OFFICE VISIT (OUTPATIENT)
Dept: INTERNAL MEDICINE CLINIC | Age: 50
End: 2020-10-12
Payer: COMMERCIAL

## 2020-10-12 VITALS
WEIGHT: 190 LBS | SYSTOLIC BLOOD PRESSURE: 148 MMHG | DIASTOLIC BLOOD PRESSURE: 80 MMHG | HEART RATE: 76 BPM | BODY MASS INDEX: 29.58 KG/M2 | TEMPERATURE: 97.1 F

## 2020-10-12 PROCEDURE — G8427 DOCREV CUR MEDS BY ELIG CLIN: HCPCS | Performed by: NURSE PRACTITIONER

## 2020-10-12 PROCEDURE — G8417 CALC BMI ABV UP PARAM F/U: HCPCS | Performed by: NURSE PRACTITIONER

## 2020-10-12 PROCEDURE — 4004F PT TOBACCO SCREEN RCVD TLK: CPT | Performed by: NURSE PRACTITIONER

## 2020-10-12 PROCEDURE — G8484 FLU IMMUNIZE NO ADMIN: HCPCS | Performed by: NURSE PRACTITIONER

## 2020-10-12 PROCEDURE — 3017F COLORECTAL CA SCREEN DOC REV: CPT | Performed by: NURSE PRACTITIONER

## 2020-10-12 PROCEDURE — 99213 OFFICE O/P EST LOW 20 MIN: CPT | Performed by: NURSE PRACTITIONER

## 2020-10-12 RX ORDER — PREDNISONE 20 MG/1
20 TABLET ORAL DAILY
Qty: 5 TABLET | Refills: 0 | Status: SHIPPED | OUTPATIENT
Start: 2020-10-12 | End: 2020-10-17

## 2020-10-12 RX ORDER — TRIAMCINOLONE ACETONIDE 1 MG/G
CREAM TOPICAL
Qty: 45 G | Refills: 5 | Status: SHIPPED | OUTPATIENT
Start: 2020-10-12 | End: 2020-10-19 | Stop reason: SDUPTHER

## 2020-10-12 NOTE — PROGRESS NOTES
SUBJECTIVE:    Patient ID: Emmie Story is a 44 y.o. male. CC: Rash    HPI: The patient presents to the office for an acute visit. 10 days ago developed rash bilateral axilla. About 4 days later moved to legs and is expanded and now is on his chest, neck, face. He describes the rash is warm, sore but not itchy. He denies any fever. He denies any swollen glands. He denies any changes in his medication, soaps, detergents. He denies any new foods. No one else at home has a rash. He has a history of psoriasis which has been mild and typically on the elbows and knees but never this severe. No treatments at home. Current Outpatient Medications   Medication Sig Dispense Refill    fluticasone-vilanterol (BREO ELLIPTA) 100-25 MCG/INH AEPB inhaler Inhale 1 puff into the lungs daily 3 each 2    pantoprazole (PROTONIX) 40 MG tablet TAKE 1 TABLET BY MOUTH EVERY DAY 30 tablet 5    atorvastatin (LIPITOR) 40 MG tablet TAKE 1 TABLET BY MOUTH EVERY DAY AT NIGHT 30 tablet 5    D3-1000 25 MCG (1000 UT) CAPS TAKE 1 CAPSULE BY MOUTH EVERY DAY 30 capsule 5    albuterol sulfate HFA (PROVENTIL HFA) 108 (90 Base) MCG/ACT inhaler Inhale 2 puffs into the lungs every 4 hours as needed for Wheezing or Shortness of Breath 1 Inhaler 5     No current facility-administered medications for this visit. Review of Systems   Constitutional: Negative for fever. Respiratory: Negative. Cardiovascular: Negative. Gastrointestinal: Negative. Genitourinary: Negative. Skin: Positive for rash. Hematological: Negative for adenopathy. All other systems reviewed and are negative. OBJECTIVE:  Physical Exam  Constitutional:       Appearance: Normal appearance. HENT:      Head: Normocephalic and atraumatic. Skin:     General: Skin is warm and dry.       Comments: Over large portion of the patient's body including his face, posterior neck and hairline, bilateral upper extremities and axilla, anterior and posterior trunk, bilateral thighs, bilateral lower legs, the patient has red, well demarcated, thick plaques which are warm to the touch. There is no induration or fluctuance. There is no drainage. Neurological:      General: No focal deficit present. Mental Status: He is alert and oriented to person, place, and time. Psychiatric:         Mood and Affect: Mood normal.         Behavior: Behavior normal.        BP (!) 148/80   Pulse 76   Temp 97.1 °F (36.2 °C) (Temporal)   Wt 190 lb (86.2 kg)   BMI 29.58 kg/m²                            PHQ Scores 1/13/2020 5/7/2019   PHQ2 Score 0 0   PHQ9 Score 0 0     Interpretation of Total Score Depression Severity: 1-4 = Minimal depression, 5-9 = Mild depression, 10-14 = Moderate depression, 15-19 = Moderately severe depression, 20-27 =Severe depression        ASSESSMENT/PLAN:  Zoey Mccain was seen today for other. Diagnoses and all orders for this visit:    Psoriasis  - History of psoriasis but never this severe. Rash does seem consistent with psoriasis by appearance. - Judicious use of steroids. Patient is aware of the risk of this would be rebound. Short course only  - Given the totality of this flare and the large surface area, he may be a candidate for methotrexate or biologic agent. Will refer him to dermatology for further evaluation and treatment options    -     predniSONE (DELTASONE) 20 MG tablet;  Take 1 tablet by mouth daily for 5 days  -     triamcinolone (KENALOG) 0.1 % cream; Apply to affected areas twice daily  -     Neftali Lubin MD, Dermatology, [place] Tong - CNP

## 2020-10-12 NOTE — Clinical Note
Hi Herman, looks like psoriasis to me (picture in visit note) but he has never had it this severe. Given the large surface area, I thought he might be a candidate for methotrexate or possibly a biologic. I gave him referral to your service. If you or one of your partners can see him.   Thanks, [employer]

## 2020-10-12 NOTE — TELEPHONE ENCOUNTER
Reason for Disposition   Purple or blood-colored spots or dots (no fever and sounds well to triager)    Answer Assessment - Initial Assessment Questions  1. APPEARANCE of RASH: \"Describe the rash. \" (e.g., spots, blisters, raised areas, skin peeling, scaly)      Dark red spots     2. SIZE: \"How big are the spots? \" (e.g., tip of pen, eraser, coin; inches, centimeters)      Varies most size of dimes, large one on leg blood red color on whole leg    3. LOCATION: \"Where is the rash located? \"      Left leg, both arms under armpits, waist, some spots on other leg     4. COLOR: \"What color is the rash? \" (Note: It is difficult to assess rash color in people with darker-colored skin. When this situation occurs, simply ask the caller to describe what they see.)      Dark red    5. ONSET: \"When did the rash begin? \"      Couple weeks    6. FEVER: \"Do you have a fever? \" If so, ask: \"What is your temperature, how was it measured, and when did it start? \"      No    7. ITCHING: \"Does the rash itch? \" If so, ask: \"How bad is the itch? \" (Scale 1-10; or mild, moderate, severe)      No    8. CAUSE: \"What do you think is causing the rash? \"      No idea    9. MEDICATION FACTORS: \"Have you started any new medications within the last 2 weeks? \" (e.g., antibiotics)       No    10. OTHER SYMPTOMS: \"Do you have any other symptoms? \" (e.g., dizziness, headache, sore throat, joint pain)        No    11. PREGNANCY: \"Is there any chance you are pregnant? \" \"When was your last menstrual period? \"        N/a    Protocols used: RASH OR REDNESS - [facility]    Attention Provider: Thank you for allowing me to participate in the care of your patient. The patient was connected to triage in response to information provided to the [facility]. Please do not respond through this encounter as the response is not directed to a shared pool.

## 2020-10-13 ASSESSMENT — ENCOUNTER SYMPTOMS
GASTROINTESTINAL NEGATIVE: 1
RESPIRATORY NEGATIVE: 1

## 2020-10-14 ENCOUNTER — TELEPHONE (OUTPATIENT)
Dept: INTERNAL MEDICINE CLINIC | Age: 50
End: 2020-10-14

## 2020-10-14 ENCOUNTER — TELEPHONE (OUTPATIENT)
Dept: DERMATOLOGY | Age: 50
End: 2020-10-14

## 2020-10-14 NOTE — TELEPHONE ENCOUNTER
Offered patient an appointment for tomorrow at 9:45am however he declined because he would like to find a dermatologist closer to where he lives. He says he does not feel comfortable driving to [address]. He stated that he will contact his PCP.

## 2020-10-14 NOTE — TELEPHONE ENCOUNTER
----- Message from Gloria Ya sent at 10/14/2020 12:27 PM EDT -----  Subject: Message to Provider    QUESTIONS  Information for Provider? Patient is unable to drive to [place]. He   needs another recommendation for a Dermatologist in the   [place] areas. This is closer to his home.   ---------------------------------------------------------------------------  --------------  CALL BACK INFO  What is the best way for the office to contact you? OK to leave message on   voicemail  Preferred Call Back Phone Number? [phone]  ---------------------------------------------------------------------------  --------------  SCRIPT ANSWERS  Relationship to Patient?  Self

## 2020-10-15 ENCOUNTER — TELEPHONE (OUTPATIENT)
Dept: INTERNAL MEDICINE CLINIC | Age: 50
End: 2020-10-15

## 2020-10-15 RX ORDER — ALBUTEROL SULFATE 90 UG/1
AEROSOL, METERED RESPIRATORY (INHALATION)
Qty: 1 INHALER | Refills: 4 | Status: SHIPPED | OUTPATIENT
Start: 2020-10-15 | End: 2021-05-17

## 2020-10-15 NOTE — TELEPHONE ENCOUNTER
You had referred pt to Dr Jeffrey Cade but he was to far down in [place] for the pt----found Dr Ginger Auguste with [address] ---would like his referral for dermatology to sent to him please. Thanks.

## 2020-10-15 NOTE — TELEPHONE ENCOUNTER
No [facility]ologists in the area unfortunately. He will need to check with his insurance and see who is covered. Dr. Soy Cardenas is located in [facility]. Dermatology of [address] [place] [address] in [facility]. The referral I gave him should work at any dermatology office. I sent his photos to [facility] dermatology and they were going to contact him to get him worked in. There could be a long wait at other locations but he can go anywhere convenient for him.

## 2020-10-16 NOTE — TELEPHONE ENCOUNTER
Called and spoke to pt's mom and advised her that he do not need new referral. And advised her to give msg to the pt.

## 2020-10-19 RX ORDER — TRIAMCINOLONE ACETONIDE 1 MG/G
CREAM TOPICAL
Qty: 454 G | Refills: 1 | Status: SHIPPED | OUTPATIENT
Start: 2020-10-19 | End: 2020-12-16

## 2020-12-16 RX ORDER — TRIAMCINOLONE ACETONIDE 1 MG/G
CREAM TOPICAL
Qty: 454 G | Refills: 1 | Status: SHIPPED | OUTPATIENT
Start: 2020-12-16 | End: 2022-03-03 | Stop reason: SDUPTHER

## 2021-05-15 DIAGNOSIS — J45.50 SEVERE PERSISTENT ASTHMA WITHOUT COMPLICATION: ICD-10-CM

## 2021-05-15 DIAGNOSIS — J44.9 CHRONIC OBSTRUCTIVE PULMONARY DISEASE, UNSPECIFIED COPD TYPE (HCC): ICD-10-CM

## 2021-05-17 RX ORDER — ALBUTEROL SULFATE 90 UG/1
AEROSOL, METERED RESPIRATORY (INHALATION)
Qty: 8.5 INHALER | Refills: 4 | Status: SHIPPED | OUTPATIENT
Start: 2021-05-17 | End: 2021-07-08 | Stop reason: SDUPTHER

## 2021-05-24 DIAGNOSIS — J45.50 SEVERE PERSISTENT ASTHMA WITHOUT COMPLICATION: ICD-10-CM

## 2021-05-24 DIAGNOSIS — J44.9 CHRONIC OBSTRUCTIVE PULMONARY DISEASE, UNSPECIFIED COPD TYPE (HCC): ICD-10-CM

## 2021-05-25 RX ORDER — FLUTICASONE FUROATE AND VILANTEROL TRIFENATATE 100; 25 UG/1; UG/1
POWDER RESPIRATORY (INHALATION)
Qty: 60 EACH | Refills: 0 | Status: SHIPPED | OUTPATIENT
Start: 2021-05-25 | End: 2021-07-08 | Stop reason: SDUPTHER

## 2021-06-30 DIAGNOSIS — J45.50 SEVERE PERSISTENT ASTHMA WITHOUT COMPLICATION: ICD-10-CM

## 2021-06-30 DIAGNOSIS — J44.9 CHRONIC OBSTRUCTIVE PULMONARY DISEASE, UNSPECIFIED COPD TYPE (HCC): ICD-10-CM

## 2021-06-30 RX ORDER — FLUTICASONE FUROATE AND VILANTEROL TRIFENATATE 100; 25 UG/1; UG/1
POWDER RESPIRATORY (INHALATION)
OUTPATIENT
Start: 2021-06-30

## 2021-07-08 ENCOUNTER — OFFICE VISIT (OUTPATIENT)
Dept: INTERNAL MEDICINE CLINIC | Age: 51
End: 2021-07-08
Payer: COMMERCIAL

## 2021-07-08 VITALS
DIASTOLIC BLOOD PRESSURE: 70 MMHG | WEIGHT: 188 LBS | BODY MASS INDEX: 28.49 KG/M2 | HEIGHT: 68 IN | OXYGEN SATURATION: 94 % | SYSTOLIC BLOOD PRESSURE: 120 MMHG | HEART RATE: 98 BPM

## 2021-07-08 DIAGNOSIS — L40.9 PSORIASIS: ICD-10-CM

## 2021-07-08 DIAGNOSIS — J45.50 SEVERE PERSISTENT ASTHMA WITHOUT COMPLICATION: ICD-10-CM

## 2021-07-08 DIAGNOSIS — Z00.00 ANNUAL PHYSICAL EXAM: Primary | ICD-10-CM

## 2021-07-08 DIAGNOSIS — E55.9 VITAMIN D DEFICIENCY: ICD-10-CM

## 2021-07-08 DIAGNOSIS — J44.9 CHRONIC OBSTRUCTIVE PULMONARY DISEASE, UNSPECIFIED COPD TYPE (HCC): ICD-10-CM

## 2021-07-08 DIAGNOSIS — Z12.11 COLON CANCER SCREENING: ICD-10-CM

## 2021-07-08 DIAGNOSIS — Z00.00 ANNUAL PHYSICAL EXAM: ICD-10-CM

## 2021-07-08 LAB
A/G RATIO: 1.6 (ref 1.1–2.2)
ALBUMIN SERPL-MCNC: 4.3 G/DL (ref 3.4–5)
ALP BLD-CCNC: 100 U/L (ref 40–129)
ALT SERPL-CCNC: 18 U/L (ref 10–40)
ANION GAP SERPL CALCULATED.3IONS-SCNC: 13 MMOL/L (ref 3–16)
AST SERPL-CCNC: 19 U/L (ref 15–37)
BILIRUB SERPL-MCNC: 0.3 MG/DL (ref 0–1)
BUN BLDV-MCNC: 9 MG/DL (ref 7–20)
CALCIUM SERPL-MCNC: 9.5 MG/DL (ref 8.3–10.6)
CHLORIDE BLD-SCNC: 102 MMOL/L (ref 99–110)
CHOLESTEROL, TOTAL: 244 MG/DL (ref 0–199)
CO2: 24 MMOL/L (ref 21–32)
CREAT SERPL-MCNC: 1.2 MG/DL (ref 0.9–1.3)
GFR AFRICAN AMERICAN: >60
GFR NON-AFRICAN AMERICAN: >60
GLOBULIN: 2.7 G/DL
GLUCOSE BLD-MCNC: 136 MG/DL (ref 70–99)
HDLC SERPL-MCNC: 45 MG/DL (ref 40–60)
HEPATITIS C ANTIBODY INTERPRETATION: NORMAL
LDL CHOLESTEROL CALCULATED: 164 MG/DL
POTASSIUM SERPL-SCNC: 4.7 MMOL/L (ref 3.5–5.1)
SODIUM BLD-SCNC: 139 MMOL/L (ref 136–145)
TOTAL PROTEIN: 7 G/DL (ref 6.4–8.2)
TRIGL SERPL-MCNC: 174 MG/DL (ref 0–150)
VITAMIN D 25-HYDROXY: 28.3 NG/ML
VLDLC SERPL CALC-MCNC: 35 MG/DL

## 2021-07-08 PROCEDURE — 99396 PREV VISIT EST AGE 40-64: CPT | Performed by: NURSE PRACTITIONER

## 2021-07-08 RX ORDER — FLUTICASONE FUROATE AND VILANTEROL TRIFENATATE 100; 25 UG/1; UG/1
1 POWDER RESPIRATORY (INHALATION) DAILY
Qty: 60 EACH | Refills: 11 | Status: SHIPPED | OUTPATIENT
Start: 2021-07-08 | End: 2021-12-15 | Stop reason: SDUPTHER

## 2021-07-08 RX ORDER — ALBUTEROL SULFATE 90 UG/1
2 AEROSOL, METERED RESPIRATORY (INHALATION) EVERY 4 HOURS PRN
Qty: 8.5 INHALER | Refills: 5 | Status: SHIPPED | OUTPATIENT
Start: 2021-07-08

## 2021-07-08 ASSESSMENT — PATIENT HEALTH QUESTIONNAIRE - PHQ9
SUM OF ALL RESPONSES TO PHQ9 QUESTIONS 1 & 2: 0
SUM OF ALL RESPONSES TO PHQ QUESTIONS 1-9: 0
2. FEELING DOWN, DEPRESSED OR HOPELESS: 0
1. LITTLE INTEREST OR PLEASURE IN DOING THINGS: 0
SUM OF ALL RESPONSES TO PHQ QUESTIONS 1-9: 0
SUM OF ALL RESPONSES TO PHQ QUESTIONS 1-9: 0

## 2021-07-09 LAB
ESTIMATED AVERAGE GLUCOSE: 116.9 MG/DL
HBA1C MFR BLD: 5.7 %

## 2021-07-15 ASSESSMENT — ENCOUNTER SYMPTOMS
GASTROINTESTINAL NEGATIVE: 1
RESPIRATORY NEGATIVE: 1

## 2021-07-15 NOTE — PROGRESS NOTES
SUBJECTIVE:    Patient ID: Dann Kwon is a 46 y.o. male. CC: Annual physical exam, COPD/asthma, psoriasis, vit D def    HPI: The patient presents to the office today for annual physical and follow-up of medical conditions. He has history of COPR/asthma. He denies any recent exacerbation. He is using Breo and albuterol. He was previously seen for psoriasis which he reports has improved on treatment. History of dyslipidemia. No chest pain. He is on statin. Past Medical History:   Diagnosis Date    Asthma     COPD (chronic obstructive pulmonary disease) ([facility])         Current Outpatient Medications   Medication Sig Dispense Refill    albuterol sulfate  (90 Base) MCG/ACT inhaler Inhale 2 puffs into the lungs every 4 hours as needed for Wheezing or Shortness of Breath 8.5 Inhaler 5    fluticasone-vilanterol (BREO ELLIPTA) 100-25 MCG/INH AEPB inhaler Inhale 1 puff into the lungs daily 60 each 11    atorvastatin (LIPITOR) 40 MG tablet TAKE 1 TABLET BY MOUTH EVERY DAY AT NIGHT 30 tablet 5    triamcinolone (KENALOG) 0.1 % cream APPLY TO AFFECTED AREA TWICE A  g 1    D3-1000 25 MCG (1000 UT) CAPS TAKE 1 CAPSULE BY MOUTH EVERY DAY 30 capsule 5     No current facility-administered medications for this visit. Review of Systems   Constitutional: Negative. Respiratory: Negative. Cardiovascular: Negative. Gastrointestinal: Negative. Genitourinary: Negative. Musculoskeletal: Negative. Skin: Positive for rash (psoriasis improving). Neurological: Negative. All other systems reviewed and are negative. OBJECTIVE:  Physical Exam  Vitals reviewed. Constitutional:       General: He is not in acute distress. Appearance: He is well-developed. He is not diaphoretic. HENT:      Head: Normocephalic and atraumatic. Eyes:      General: No scleral icterus. Conjunctiva/sclera: Conjunctivae normal.   Neck:      Vascular: No JVD.    Cardiovascular: Rate and Rhythm: Normal rate and regular rhythm. Pulmonary:      Effort: Pulmonary effort is normal. No respiratory distress. Breath sounds: Normal breath sounds. No wheezing or rales. Abdominal:      General: There is no distension. Palpations: Abdomen is soft. Tenderness: There is no abdominal tenderness. There is no guarding or rebound. Musculoskeletal:         General: Normal range of motion. Cervical back: Neck supple. Skin:     General: Skin is warm and dry. Neurological:      Mental Status: He is alert and oriented to person, place, and time. Psychiatric:         Behavior: Behavior normal.         Thought Content: Thought content normal.        /70   Pulse 98   Ht 5' 7.5\" (1.715 m)   Wt 188 lb (85.3 kg)   SpO2 94%   BMI 29.01 kg/m²      PHQ Scores 7/8/2021 1/13/2020 5/7/2019   PHQ2 Score 0 0 0   PHQ9 Score 0 0 0     Interpretation of Total Score Depression Severity: 1-4 = Minimal depression, 5-9 = Mild depression, 10-14 = Moderate depression, 15-19 = Moderately severe depression, 20-27 =Severe depression      ASSESSMENT/PLAN:  Ernestine Cervantes was seen today for copd. Diagnoses and all orders for this visit:    Annual physical exam  -     COMPREHENSIVE METABOLIC PANEL; Future  -     LIPID PANEL; Future  -     Hemoglobin A1C; Future  -     Vitamin D 25 Hydroxy; Future  -     Hepatitis C Antibody; Future  -     POCT Fecal Immunochemical Test (FIT); Future    Chronic obstructive pulmonary disease, unspecified COPD type (HCC)  -     albuterol sulfate  (90 Base) MCG/ACT inhaler; Inhale 2 puffs into the lungs every 4 hours as needed for Wheezing or Shortness of Breath  -     fluticasone-vilanterol (BREO ELLIPTA) 100-25 MCG/INH AEPB inhaler; Inhale 1 puff into the lungs daily    Severe persistent asthma without complication  -     albuterol sulfate  (90 Base) MCG/ACT inhaler;  Inhale 2 puffs into the lungs every 4 hours as needed for Wheezing or Shortness of Breath  - fluticasone-vilanterol (BREO ELLIPTA) 100-25 MCG/INH AEPB inhaler; Inhale 1 puff into the lungs daily    Psoriasis    Vitamin D deficiency  -     Vitamin D 25 Hydroxy; Future    Colon cancer screening  -     POCT Fecal Immunochemical Test (FIT);  Future        ORLANDO Coto - CNP

## 2021-12-15 DIAGNOSIS — J44.9 CHRONIC OBSTRUCTIVE PULMONARY DISEASE, UNSPECIFIED COPD TYPE (HCC): ICD-10-CM

## 2021-12-15 DIAGNOSIS — J45.50 SEVERE PERSISTENT ASTHMA WITHOUT COMPLICATION: ICD-10-CM

## 2021-12-15 RX ORDER — FLUTICASONE FUROATE AND VILANTEROL TRIFENATATE 100; 25 UG/1; UG/1
1 POWDER RESPIRATORY (INHALATION) DAILY
Qty: 60 EACH | Refills: 1 | Status: SHIPPED | OUTPATIENT
Start: 2021-12-15 | End: 2021-12-17 | Stop reason: SDUPTHER

## 2021-12-15 NOTE — TELEPHONE ENCOUNTER
Patient calling to have his medication Fluticasone-vilanterol (BREO ELLIPTA) 100-25 MCG/INH AEPB inhaler sent over to [facility] on the [address] of [address]. Phone number [phone]. States that [facility] pharmacy advised him that they did not know when they would get that medicine in and that he has been out of it for one day already and really needs it. Patient would like a call back at [phone] when it is sent over. Thank you.

## 2021-12-17 ENCOUNTER — TELEPHONE (OUTPATIENT)
Dept: INTERNAL MEDICINE CLINIC | Age: 51
End: 2021-12-17

## 2021-12-17 DIAGNOSIS — J44.9 CHRONIC OBSTRUCTIVE PULMONARY DISEASE, UNSPECIFIED COPD TYPE (HCC): ICD-10-CM

## 2021-12-17 DIAGNOSIS — J45.50 SEVERE PERSISTENT ASTHMA WITHOUT COMPLICATION: ICD-10-CM

## 2021-12-17 RX ORDER — FLUTICASONE FUROATE AND VILANTEROL TRIFENATATE 100; 25 UG/1; UG/1
1 POWDER RESPIRATORY (INHALATION) DAILY
Qty: 60 EACH | Refills: 1 | Status: SHIPPED
Start: 2021-12-17 | End: 2022-01-20 | Stop reason: DRUGHIGH

## 2021-12-17 NOTE — TELEPHONE ENCOUNTER
Pt calling, states pharmacy does not have fluticasone-vilanterol (BREO ELLIPTA) 100-25 MCG/INH AEPB inhaler and does not know when they will get it back in.  Asks if it can be sent to [facility] on [address]

## 2022-01-20 ENCOUNTER — OFFICE VISIT (OUTPATIENT)
Dept: INTERNAL MEDICINE CLINIC | Age: 52
End: 2022-01-20
Payer: COMMERCIAL

## 2022-01-20 VITALS
SYSTOLIC BLOOD PRESSURE: 110 MMHG | HEIGHT: 68 IN | HEART RATE: 100 BPM | DIASTOLIC BLOOD PRESSURE: 60 MMHG | OXYGEN SATURATION: 94 % | BODY MASS INDEX: 29.4 KG/M2 | WEIGHT: 194 LBS

## 2022-01-20 DIAGNOSIS — L40.9 PSORIASIS: ICD-10-CM

## 2022-01-20 DIAGNOSIS — E55.9 VITAMIN D DEFICIENCY: ICD-10-CM

## 2022-01-20 DIAGNOSIS — J45.50 SEVERE PERSISTENT ASTHMA WITHOUT COMPLICATION: Primary | ICD-10-CM

## 2022-01-20 DIAGNOSIS — J44.9 CHRONIC OBSTRUCTIVE PULMONARY DISEASE, UNSPECIFIED COPD TYPE (HCC): ICD-10-CM

## 2022-01-20 PROCEDURE — G8484 FLU IMMUNIZE NO ADMIN: HCPCS | Performed by: NURSE PRACTITIONER

## 2022-01-20 PROCEDURE — 4004F PT TOBACCO SCREEN RCVD TLK: CPT | Performed by: NURSE PRACTITIONER

## 2022-01-20 PROCEDURE — 3017F COLORECTAL CA SCREEN DOC REV: CPT | Performed by: NURSE PRACTITIONER

## 2022-01-20 PROCEDURE — G8427 DOCREV CUR MEDS BY ELIG CLIN: HCPCS | Performed by: NURSE PRACTITIONER

## 2022-01-20 PROCEDURE — 99214 OFFICE O/P EST MOD 30 MIN: CPT | Performed by: NURSE PRACTITIONER

## 2022-01-20 PROCEDURE — 3023F SPIROM DOC REV: CPT | Performed by: NURSE PRACTITIONER

## 2022-01-20 PROCEDURE — G8419 CALC BMI OUT NRM PARAM NOF/U: HCPCS | Performed by: NURSE PRACTITIONER

## 2022-01-20 NOTE — PROGRESS NOTES
normal.   Neck:      Vascular: No JVD. Cardiovascular:      Rate and Rhythm: Normal rate and regular rhythm. Pulmonary:      Effort: Pulmonary effort is normal. No respiratory distress. Breath sounds: Normal breath sounds. No wheezing or rales. Abdominal:      General: There is no distension. Palpations: Abdomen is soft. Tenderness: There is no abdominal tenderness. There is no guarding or rebound. Musculoskeletal:         General: Normal range of motion. Cervical back: Neck supple. Skin:     General: Skin is warm and dry. Neurological:      Mental Status: He is alert and oriented to person, place, and time. Psychiatric:         Behavior: Behavior normal.         Thought Content: Thought content normal.        /60   Pulse 100   Ht 5' 7.5\" (1.715 m)   Wt 194 lb (88 kg)   SpO2 94%   BMI 29.94 kg/m²      PHQ Scores 7/8/2021 1/13/2020 5/7/2019   PHQ2 Score 0 0 0   PHQ9 Score 0 0 0     Interpretation of Total Score Depression Severity: 1-4 = Minimal depression, 5-9 = Mild depression, 10-14 = Moderate depression, 15-19 = Moderately severe depression, 20-27 =Severe depression      ASSESSMENT/PLAN:  Nitin Lundberg was seen today for copd and hyperlipidemia. Diagnoses and all orders for this visit:    Severe persistent asthma without complication  - Breo helping but seems to wear off. He wants to try something that is taken BID  -     Fluticasone furoate-vilanterol (BREO ELLIPTA) 200-25 MCG/INH AEPB inhaler; Inhale 1 puff into the lungs daily    Chronic obstructive pulmonary disease, unspecified COPD type (HCC)  - As above  -     Fluticasone furoate-vilanterol (BREO ELLIPTA) 200-25 MCG/INH AEPB inhaler;  Inhale 1 puff into the lungs daily    Vitamin D deficiency  - Chronic  - Monitor    Psoriasis  - Chronic  - Stable      ORLANDO San - CNP

## 2022-01-31 ASSESSMENT — ENCOUNTER SYMPTOMS
RESPIRATORY NEGATIVE: 1
GASTROINTESTINAL NEGATIVE: 1

## 2022-03-03 ENCOUNTER — OFFICE VISIT (OUTPATIENT)
Dept: INTERNAL MEDICINE CLINIC | Age: 52
End: 2022-03-03
Payer: COMMERCIAL

## 2022-03-03 ENCOUNTER — NURSE TRIAGE (OUTPATIENT)
Dept: OTHER | Facility: CLINIC | Age: 52
End: 2022-03-03

## 2022-03-03 VITALS
OXYGEN SATURATION: 96 % | BODY MASS INDEX: 30.09 KG/M2 | HEART RATE: 100 BPM | WEIGHT: 195 LBS | SYSTOLIC BLOOD PRESSURE: 130 MMHG | DIASTOLIC BLOOD PRESSURE: 80 MMHG

## 2022-03-03 DIAGNOSIS — S89.92XA INJURY OF LEFT LOWER EXTREMITY, INITIAL ENCOUNTER: Primary | ICD-10-CM

## 2022-03-03 DIAGNOSIS — L40.9 PSORIASIS: ICD-10-CM

## 2022-03-03 PROCEDURE — G8427 DOCREV CUR MEDS BY ELIG CLIN: HCPCS | Performed by: NURSE PRACTITIONER

## 2022-03-03 PROCEDURE — 4004F PT TOBACCO SCREEN RCVD TLK: CPT | Performed by: NURSE PRACTITIONER

## 2022-03-03 PROCEDURE — G8417 CALC BMI ABV UP PARAM F/U: HCPCS | Performed by: NURSE PRACTITIONER

## 2022-03-03 PROCEDURE — 3017F COLORECTAL CA SCREEN DOC REV: CPT | Performed by: NURSE PRACTITIONER

## 2022-03-03 PROCEDURE — 99213 OFFICE O/P EST LOW 20 MIN: CPT | Performed by: NURSE PRACTITIONER

## 2022-03-03 PROCEDURE — G8484 FLU IMMUNIZE NO ADMIN: HCPCS | Performed by: NURSE PRACTITIONER

## 2022-03-03 RX ORDER — METHYLPREDNISOLONE 4 MG/1
TABLET ORAL
Qty: 1 KIT | Refills: 0 | Status: SHIPPED | OUTPATIENT
Start: 2022-03-03 | End: 2022-03-11 | Stop reason: SDUPTHER

## 2022-03-03 RX ORDER — TRIAMCINOLONE ACETONIDE 1 MG/G
CREAM TOPICAL
Qty: 454 G | Refills: 1 | Status: SHIPPED | OUTPATIENT
Start: 2022-03-03 | End: 2022-06-28
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
## 2022-03-03 NOTE — PROGRESS NOTES
SUBJECTIVE:    Patient ID: Elyssa Tirado is a 46 y.o. male. CC: Left ankle pain    HPI: The patient presents to the office for an acute visit. Remote history of left ankle injury. Two days ago, 100lb dog sat on his ankle causing it to bend inwards. He had swelling and pain. Some relief icy hot, heating pad, soaking last night which helped but worse again today. Current Outpatient Medications   Medication Sig Dispense Refill    Fluticasone furoate-vilanterol (BREO ELLIPTA) 200-25 MCG/INH AEPB inhaler Inhale 1 puff into the lungs daily 28 each 11    albuterol sulfate  (90 Base) MCG/ACT inhaler Inhale 2 puffs into the lungs every 4 hours as needed for Wheezing or Shortness of Breath 8.5 Inhaler 5    triamcinolone (KENALOG) 0.1 % cream APPLY TO AFFECTED AREA TWICE A DAY (Patient not taking: Reported on 1/20/2022) 454 g 1    atorvastatin (LIPITOR) 40 MG tablet TAKE 1 TABLET BY MOUTH EVERY DAY AT NIGHT 30 tablet 5    D3-1000 25 MCG (1000 UT) CAPS TAKE 1 CAPSULE BY MOUTH EVERY DAY 30 capsule 5     No current facility-administered medications for this visit. Review of Systems   Musculoskeletal: Positive for arthralgias, gait problem and myalgias. All other systems reviewed and are negative. OBJECTIVE:  Physical Exam  Constitutional:       Appearance: Normal appearance. HENT:      Head: Normocephalic and atraumatic. Musculoskeletal:      Left lower leg: Tenderness present. No deformity. No edema. Left foot: Normal range of motion. Swelling (mild) and crepitus present. No deformity, foot drop, tenderness or bony tenderness. Legs:    Skin:     General: Skin is warm and dry. Findings: Rash (psoriasis bilateral legs) present. Neurological:      General: No focal deficit present. Mental Status: He is alert and oriented to person, place, and time.    Psychiatric:         Mood and Affect: Mood normal.         Behavior: Behavior normal.        /80 Pulse 100   Wt 195 lb (88.5 kg)   SpO2 96%   BMI 30.09 kg/m²      PHQ Scores 7/8/2021 1/13/2020 5/7/2019   PHQ2 Score 0 0 0   PHQ9 Score 0 0 0     Interpretation of Total Score Depression Severity: 1-4 = Minimal depression, 5-9 = Mild depression, 10-14 = Moderate depression, 15-19 = Moderately severe depression, 20-27 =Severe depression        ASSESSMENT/PLAN:  Alana Blevins was seen today for ankle pain. Diagnoses and all orders for this visit:    Injury of left lower extremity, initial encounter  -     methylPREDNISolone (MEDROL, DOTTIE,) 4 MG tablet; Take as directed    Psoriasis  -     triamcinolone (KENALOG) 0.1 % cream; APPLY TO AFFECTED AREA TWICE A DAY    -Left leg pain after heavy dog weight on his leg. It sounds like his ankle was turned inward but he has very little ankle pain on examination today. There is a small amount of swelling about the ankle but full range of motion.  -Pain is really more in the posterior lower leg. There are some redness of the leg but this is chronic and related to severe psoriasis which is not new  -No obvious edema not present. No pain with dorsiflexion of the foot.       Vi Santillan, ORLANDO - CNP

## 2022-03-09 ENCOUNTER — NURSE TRIAGE (OUTPATIENT)
Dept: OTHER | Facility: CLINIC | Age: 52
End: 2022-03-09

## 2022-03-09 DIAGNOSIS — S89.92XA INJURY OF LEFT LOWER EXTREMITY, INITIAL ENCOUNTER: ICD-10-CM

## 2022-03-09 NOTE — TELEPHONE ENCOUNTER
Received call from Mindy Montanez at [facility] with Red Flag Complaint. Subjective: Caller states \"Left foot pain\"     Current Symptoms: Left foot pain was improving. Pain is starting again this morning. Onset: a few hours ago; gradual    Associated Symptoms: NA    Pain Severity: 5/10; sharp; intermittent    Temperature: denies fever    What has been tried: Finished steroids     LMP: NA Pregnant: NA    Recommended disposition: See PCP within 3 Days    Care advice provided, patient verbalizes understanding; denies any other questions or concerns; instructed to call back for any new or worsening symptoms. Patient/Caller agrees with recommended disposition; writer provided warm transfer to [facility] at [facility] for appointment scheduling     Attention Provider: Thank you for allowing me to participate in the care of your patient. The patient was connected to triage in response to information provided to the ECC/PSC. Please do not respond through this encounter as the response is not directed to a shared pool.       Reason for Disposition   Patient wants to be seen    Protocols used: FOOT PAIN-ADULT-[place]

## 2022-03-10 RX ORDER — METHYLPREDNISOLONE 4 MG/1
TABLET ORAL
Qty: 21 TABLET | OUTPATIENT
Start: 2022-03-10

## 2022-03-11 ENCOUNTER — OFFICE VISIT (OUTPATIENT)
Dept: INTERNAL MEDICINE CLINIC | Age: 52
End: 2022-03-11
Payer: COMMERCIAL

## 2022-03-11 VITALS
WEIGHT: 192 LBS | DIASTOLIC BLOOD PRESSURE: 60 MMHG | BODY MASS INDEX: 29.63 KG/M2 | SYSTOLIC BLOOD PRESSURE: 122 MMHG | HEART RATE: 100 BPM | OXYGEN SATURATION: 96 %

## 2022-03-11 DIAGNOSIS — S89.92XA INJURY OF LEFT LOWER EXTREMITY, INITIAL ENCOUNTER: ICD-10-CM

## 2022-03-11 PROCEDURE — G8427 DOCREV CUR MEDS BY ELIG CLIN: HCPCS | Performed by: NURSE PRACTITIONER

## 2022-03-11 PROCEDURE — 3017F COLORECTAL CA SCREEN DOC REV: CPT | Performed by: NURSE PRACTITIONER

## 2022-03-11 PROCEDURE — G8417 CALC BMI ABV UP PARAM F/U: HCPCS | Performed by: NURSE PRACTITIONER

## 2022-03-11 PROCEDURE — 99213 OFFICE O/P EST LOW 20 MIN: CPT | Performed by: NURSE PRACTITIONER

## 2022-03-11 PROCEDURE — G8484 FLU IMMUNIZE NO ADMIN: HCPCS | Performed by: NURSE PRACTITIONER

## 2022-03-11 PROCEDURE — 4004F PT TOBACCO SCREEN RCVD TLK: CPT | Performed by: NURSE PRACTITIONER

## 2022-03-11 RX ORDER — METHYLPREDNISOLONE 4 MG/1
TABLET ORAL
Qty: 1 KIT | Refills: 0 | Status: SHIPPED | OUTPATIENT
Start: 2022-03-11 | End: 2022-03-17

## 2022-03-11 NOTE — PROGRESS NOTES
SUBJECTIVE:    Patient ID: Lb March is a 46 y.o. male. CC: Ankle injury    HPI: The patient presents to the office for an acute visit. Presents for short follow-up of a left ankle injury. Patient was seen last week after his 100 pound dog sat on his ankle causing it to bend and words. He had ankle pain and swelling. He is getting some relief with icy hot and heating pad. He was started on a Medrol Dosepak which helped quite a bit. Unfortunately, the pain came back at the end of the steroid pack. He denies any catching or clicking of the ankle. Swelling is better. There is no redness. Current Outpatient Medications   Medication Sig Dispense Refill    methylPREDNISolone (MEDROL, DOTTIE,) 4 MG tablet Take as directed 1 kit 0    triamcinolone (KENALOG) 0.1 % cream APPLY TO AFFECTED AREA TWICE A  g 1    Fluticasone furoate-vilanterol (BREO ELLIPTA) 200-25 MCG/INH AEPB inhaler Inhale 1 puff into the lungs daily 28 each 11    albuterol sulfate  (90 Base) MCG/ACT inhaler Inhale 2 puffs into the lungs every 4 hours as needed for Wheezing or Shortness of Breath 8.5 Inhaler 5    atorvastatin (LIPITOR) 40 MG tablet TAKE 1 TABLET BY MOUTH EVERY DAY AT NIGHT 30 tablet 5    D3-1000 25 MCG (1000 UT) CAPS TAKE 1 CAPSULE BY MOUTH EVERY DAY 30 capsule 5     No current facility-administered medications for this visit. Review of Systems   Musculoskeletal: Positive for arthralgias and gait problem. OBJECTIVE:  Physical Exam  Constitutional:       Appearance: Normal appearance. HENT:      Head: Normocephalic and atraumatic. Musculoskeletal:      Left ankle: No swelling, deformity, ecchymosis or lacerations. Tenderness present. Normal range of motion. Skin:     General: Skin is warm and dry. Neurological:      General: No focal deficit present. Mental Status: He is alert and oriented to person, place, and time.    Psychiatric:         Mood and Affect: Mood normal. Behavior: Behavior normal.        /60   Pulse 100   Wt 192 lb (87.1 kg)   SpO2 96%   BMI 29.63 kg/m²      PHQ Scores 7/8/2021 1/13/2020 5/7/2019   PHQ2 Score 0 0 0   PHQ9 Score 0 0 0     Interpretation of Total Score Depression Severity: 1-4 = Minimal depression, 5-9 = Mild depression, 10-14 = Moderate depression, 15-19 = Moderately severe depression, 20-27 =Severe depression      ASSESSMENT/PLAN:  Goldie Cyr was seen today for foot pain. Diagnoses and all orders for this visit:    Injury of left lower extremity, initial encounter  -     methylPREDNISolone (MEDROL, DOTTIE,) 4 MG tablet; Take as directed  -     XR ANKLE LEFT (MIN 3 VIEWS)    -Significant improvement with steroid pack until it ended.   He would like to try this again.  -We will extend steroid pack x1  -We will order an ankle x-ray if he is not better      ORLANDO Chaidez - CNP

## 2022-03-14 ENCOUNTER — HOSPITAL ENCOUNTER (OUTPATIENT)
Dept: GENERAL RADIOLOGY | Age: 52
Discharge: HOME | End: 2022-03-14
Payer: COMMERCIAL

## 2022-03-14 ENCOUNTER — HOSPITAL ENCOUNTER (OUTPATIENT)
Age: 52
Discharge: HOME | End: 2022-03-14
Payer: COMMERCIAL

## 2022-03-14 DIAGNOSIS — S89.92XA INJURY OF LEFT LOWER EXTREMITY, INITIAL ENCOUNTER: ICD-10-CM

## 2022-03-14 PROCEDURE — 73610 X-RAY EXAM OF ANKLE: CPT

## 2022-03-15 ENCOUNTER — TELEPHONE (OUTPATIENT)
Dept: INTERNAL MEDICINE CLINIC | Age: 52
End: 2022-03-15

## 2022-03-15 NOTE — TELEPHONE ENCOUNTER
----- Message from Benson Jasmine sent at 3/15/2022 11:41 AM EDT -----  Subject: Results Request    QUESTIONS  Which lab or imaging result is the patient calling about? X-ray  Which provider ordered the test?   At what location was the test performed? Date the test was performed? Additional Information for Provider?   ---------------------------------------------------------------------------  --------------  CALL BACK INFO  What is the best way for the office to contact you? OK to leave message on   voicemail  Preferred Call Back Phone Number?  [phone]

## 2022-03-18 ENCOUNTER — OFFICE VISIT (OUTPATIENT)
Dept: INTERNAL MEDICINE CLINIC | Age: 52
End: 2022-03-18
Payer: COMMERCIAL

## 2022-03-18 ENCOUNTER — NURSE TRIAGE (OUTPATIENT)
Dept: OTHER | Facility: CLINIC | Age: 52
End: 2022-03-18

## 2022-03-18 ENCOUNTER — OFFICE VISIT (OUTPATIENT)
Dept: ORTHOPEDIC SURGERY | Age: 52
End: 2022-03-18
Payer: COMMERCIAL

## 2022-03-18 ENCOUNTER — APPOINTMENT (OUTPATIENT)
Dept: CT IMAGING | Age: 52
DRG: 182 | End: 2022-03-18
Payer: COMMERCIAL

## 2022-03-18 ENCOUNTER — HOSPITAL ENCOUNTER (INPATIENT)
Age: 52
LOS: 5 days | Discharge: HOME | DRG: 182 | End: 2022-03-24
Attending: EMERGENCY MEDICINE | Admitting: INTERNAL MEDICINE
Payer: COMMERCIAL

## 2022-03-18 VITALS — WEIGHT: 191 LBS | BODY MASS INDEX: 29.98 KG/M2 | HEIGHT: 67 IN

## 2022-03-18 VITALS
WEIGHT: 191 LBS | HEART RATE: 102 BPM | OXYGEN SATURATION: 95 % | DIASTOLIC BLOOD PRESSURE: 80 MMHG | SYSTOLIC BLOOD PRESSURE: 138 MMHG | BODY MASS INDEX: 29.47 KG/M2

## 2022-03-18 DIAGNOSIS — M79.672 LEFT FOOT PAIN: Primary | ICD-10-CM

## 2022-03-18 DIAGNOSIS — I99.8 ISCHEMIC PAIN OF LEFT FOOT: Primary | ICD-10-CM

## 2022-03-18 DIAGNOSIS — M79.672 ISCHEMIC PAIN OF LEFT FOOT: Primary | ICD-10-CM

## 2022-03-18 DIAGNOSIS — I70.202 LEFT POPLITEAL ARTERY OCCLUSION (HCC): ICD-10-CM

## 2022-03-18 DIAGNOSIS — L40.9 PSORIASIS: ICD-10-CM

## 2022-03-18 DIAGNOSIS — S89.92XA INJURY OF LEFT LOWER EXTREMITY, INITIAL ENCOUNTER: Primary | ICD-10-CM

## 2022-03-18 DIAGNOSIS — I99.8 ISCHEMIA OF LEFT LOWER EXTREMITY: ICD-10-CM

## 2022-03-18 LAB
A/G RATIO: 1.6 (ref 1.1–2.2)
ALBUMIN SERPL-MCNC: 4.1 G/DL (ref 3.4–5)
ALP BLD-CCNC: 68 U/L (ref 40–129)
ALT SERPL-CCNC: 35 U/L (ref 10–40)
ANION GAP SERPL CALCULATED.3IONS-SCNC: 12 MMOL/L (ref 3–16)
APTT: 29.5 SEC (ref 26.2–38.6)
AST SERPL-CCNC: 29 U/L (ref 15–37)
BASOPHILS ABSOLUTE: 0.1 K/UL (ref 0–0.2)
BASOPHILS RELATIVE PERCENT: 0.4 %
BILIRUB SERPL-MCNC: 0.3 MG/DL (ref 0–1)
BUN BLDV-MCNC: 17 MG/DL (ref 7–20)
CALCIUM SERPL-MCNC: 9.3 MG/DL (ref 8.3–10.6)
CHLORIDE BLD-SCNC: 99 MMOL/L (ref 99–110)
CO2: 25 MMOL/L (ref 21–32)
CREAT SERPL-MCNC: 0.9 MG/DL (ref 0.9–1.3)
EOSINOPHILS ABSOLUTE: 0.2 K/UL (ref 0–0.6)
EOSINOPHILS RELATIVE PERCENT: 0.7 %
GFR AFRICAN AMERICAN: >60
GFR NON-AFRICAN AMERICAN: >60
GLUCOSE BLD-MCNC: 115 MG/DL (ref 70–99)
HCT VFR BLD CALC: 46.3 % (ref 40.5–52.5)
HEMOGLOBIN: 15.8 G/DL (ref 13.5–17.5)
INR BLD: 1.17 (ref 0.88–1.12)
LYMPHOCYTES ABSOLUTE: 3.7 K/UL (ref 1–5.1)
LYMPHOCYTES RELATIVE PERCENT: 16.9 %
MCH RBC QN AUTO: 33.5 PG (ref 26–34)
MCHC RBC AUTO-ENTMCNC: 34 G/DL (ref 31–36)
MCV RBC AUTO: 98.5 FL (ref 80–100)
MONOCYTES ABSOLUTE: 1.5 K/UL (ref 0–1.3)
MONOCYTES RELATIVE PERCENT: 6.8 %
NEUTROPHILS ABSOLUTE: 16.7 K/UL (ref 1.7–7.7)
NEUTROPHILS RELATIVE PERCENT: 75.2 %
PDW BLD-RTO: 14.3 % (ref 12.4–15.4)
PLATELET # BLD: 281 K/UL (ref 135–450)
PMV BLD AUTO: 7.9 FL (ref 5–10.5)
POTASSIUM SERPL-SCNC: 4 MMOL/L (ref 3.5–5.1)
PRO-BNP: 544 PG/ML (ref 0–124)
PROTHROMBIN TIME: 13.3 SEC (ref 9.9–12.7)
RBC # BLD: 4.7 M/UL (ref 4.2–5.9)
SODIUM BLD-SCNC: 136 MMOL/L (ref 136–145)
TOTAL PROTEIN: 6.7 G/DL (ref 6.4–8.2)
TROPONIN: <0.01 NG/ML
WBC # BLD: 22.1 K/UL (ref 4–11)

## 2022-03-18 PROCEDURE — G8484 FLU IMMUNIZE NO ADMIN: HCPCS | Performed by: PHYSICIAN ASSISTANT

## 2022-03-18 PROCEDURE — 83690 ASSAY OF LIPASE: CPT

## 2022-03-18 PROCEDURE — 6360000004 HC RX CONTRAST MEDICATION: Performed by: PHYSICIAN ASSISTANT

## 2022-03-18 PROCEDURE — G8417 CALC BMI ABV UP PARAM F/U: HCPCS | Performed by: PHYSICIAN ASSISTANT

## 2022-03-18 PROCEDURE — 4004F PT TOBACCO SCREEN RCVD TLK: CPT | Performed by: PHYSICIAN ASSISTANT

## 2022-03-18 PROCEDURE — 99284 EMERGENCY DEPT VISIT MOD MDM: CPT

## 2022-03-18 PROCEDURE — 84145 PROCALCITONIN (PCT): CPT

## 2022-03-18 PROCEDURE — G8417 CALC BMI ABV UP PARAM F/U: HCPCS | Performed by: NURSE PRACTITIONER

## 2022-03-18 PROCEDURE — 99213 OFFICE O/P EST LOW 20 MIN: CPT | Performed by: NURSE PRACTITIONER

## 2022-03-18 PROCEDURE — 75635 CT ANGIO ABDOMINAL ARTERIES: CPT

## 2022-03-18 PROCEDURE — 84484 ASSAY OF TROPONIN QUANT: CPT

## 2022-03-18 PROCEDURE — G8427 DOCREV CUR MEDS BY ELIG CLIN: HCPCS | Performed by: NURSE PRACTITIONER

## 2022-03-18 PROCEDURE — G8484 FLU IMMUNIZE NO ADMIN: HCPCS | Performed by: NURSE PRACTITIONER

## 2022-03-18 PROCEDURE — G8427 DOCREV CUR MEDS BY ELIG CLIN: HCPCS | Performed by: PHYSICIAN ASSISTANT

## 2022-03-18 PROCEDURE — 96375 TX/PRO/DX INJ NEW DRUG ADDON: CPT

## 2022-03-18 PROCEDURE — 83880 ASSAY OF NATRIURETIC PEPTIDE: CPT

## 2022-03-18 PROCEDURE — 36415 COLL VENOUS BLD VENIPUNCTURE: CPT

## 2022-03-18 PROCEDURE — 85730 THROMBOPLASTIN TIME PARTIAL: CPT

## 2022-03-18 PROCEDURE — 96365 THER/PROPH/DIAG IV INF INIT: CPT

## 2022-03-18 PROCEDURE — 93005 ELECTROCARDIOGRAM TRACING: CPT | Performed by: PHYSICIAN ASSISTANT

## 2022-03-18 PROCEDURE — 99204 OFFICE O/P NEW MOD 45 MIN: CPT | Performed by: PHYSICIAN ASSISTANT

## 2022-03-18 PROCEDURE — 80053 COMPREHEN METABOLIC PANEL: CPT

## 2022-03-18 PROCEDURE — 4004F PT TOBACCO SCREEN RCVD TLK: CPT | Performed by: NURSE PRACTITIONER

## 2022-03-18 PROCEDURE — 3017F COLORECTAL CA SCREEN DOC REV: CPT | Performed by: NURSE PRACTITIONER

## 2022-03-18 PROCEDURE — 85025 COMPLETE CBC W/AUTO DIFF WBC: CPT

## 2022-03-18 PROCEDURE — 3017F COLORECTAL CA SCREEN DOC REV: CPT | Performed by: PHYSICIAN ASSISTANT

## 2022-03-18 PROCEDURE — 85610 PROTHROMBIN TIME: CPT

## 2022-03-18 RX ADMIN — IOPAMIDOL 120 ML: 755 INJECTION, SOLUTION INTRAVENOUS at 20:52

## 2022-03-18 ASSESSMENT — ENCOUNTER SYMPTOMS
VOMITING: 0
COUGH: 0
NAUSEA: 0
ABDOMINAL PAIN: 0
RHINORRHEA: 0
DIARRHEA: 0
SHORTNESS OF BREATH: 0

## 2022-03-18 ASSESSMENT — PAIN DESCRIPTION - ORIENTATION: ORIENTATION: LEFT

## 2022-03-18 ASSESSMENT — PAIN SCALES - GENERAL: PAINLEVEL_OUTOF10: 4

## 2022-03-18 ASSESSMENT — PAIN DESCRIPTION - PAIN TYPE: TYPE: ACUTE PAIN

## 2022-03-18 ASSESSMENT — PAIN - FUNCTIONAL ASSESSMENT: PAIN_FUNCTIONAL_ASSESSMENT: 0-10

## 2022-03-18 ASSESSMENT — PAIN DESCRIPTION - LOCATION: LOCATION: FOOT

## 2022-03-18 NOTE — PROGRESS NOTES
Doppler on foot unsuccessful; foot is warm to touch but no palpitable pulse even up behind knee. Foot and leg is pale but brisk cap refill.

## 2022-03-18 NOTE — TELEPHONE ENCOUNTER
Received call from [address] at [facility] with Red Flag Complaint. Subjective: Caller states \"My left ankle has been having this numbing pain that seems to get worse at night. I was on some steroids and that seems to relieve that pain and I am out. \"     Current Symptoms:   Left ankle pain- numbing pain  Seems to worsen at HS  Unable to sleep d/t pain  Able to bear weight on ankle    Denies swelling, discoloration, or changes in temp. Onset: 3 days ago; gradual    Associated Symptoms: NA    Pain Severity: moderate pain    Temperature: denies    What has been tried: was on steroid which helped, but ran out of med    LMP: NA Pregnant: NA    Recommended disposition: See PCP within 3 Days    Care advice provided, patient verbalizes understanding; denies any other questions or concerns; instructed to call back for any new or worsening symptoms. Patient/Caller agrees with recommended disposition; writer provided warm transfer to [facility] at [facility] for appointment scheduling     Attention Provider: Thank you for allowing me to participate in the care of your patient. The patient was connected to triage in response to information provided to the ECC/PSC. Please do not respond through this encounter as the response is not directed to a shared pool.       Reason for Disposition   [1] MODERATE pain (e.g., interferes with normal activities, limping) AND [2] present > 3 days    Protocols used: ANKLE PAIN-ADULT-

## 2022-03-18 NOTE — ED PROVIDER NOTES
[address]        Pt Name: Monique Nieves  MRN: [id number]  [place] 1970  Date of evaluation: 3/18/2022  Provider: Lang Welch PA-C  PCP: ORLANDO San CNP  Note Started: 7:19 PM EDT        I have seen and evaluated this patient with my supervising physician Wallace Cummins MD.    CHIEF COMPLAINT       Chief Complaint   Patient presents with    Foot Pain     came from Sports med MD - needs doppler studies for decreased circulation on that foot; (+) numbness       HISTORY OF PRESENT ILLNESS   (Location, Timing/Onset, Context/Setting, Quality, Duration, Modifying Factors, Severity, Associated Signs and Symptoms)  Note limiting factors. Chief Complaint:  Left foot pain     Monique Nieves is a 46 y.o. male who presents emergency department today for evaluation for left foot pain. The patient states that he has been experiencing pain to the top of his left foot, he states that this is actually been ongoing for approximately 3 weeks. The patient states that initially 3 weeks ago a dog sat on his foot, and he states that he noticed pain after this. Patient states that he did have some increasing discoloration to his foot, he states that he was seen by an outside provider and was given steroids. He states that he has since finished the steroids however he states that the steroids did improve his coloration patient tells me that he does not really have much pain at this time. He is rating his discomfort as a 4/10. Patient states that his symptoms seem to be worse at night when he is lying flat, and with the first few steps in the morning. States that he thought that this was secondary to a sprain, and he went to sports medicine at the [facility] and he was told to come to the emergency room for concerns of a circulation issue. The patient is on aspirin, no other blood thinners.   He has no numbness, tingling or weakness. He has no chest pain, shortness of breath or abdominal pain. He is a smoker. Patient denies any issues with circulation in the past.  He denies any recent illnesses. Patient otherwise has no complaints at this time. Nursing Notes were all reviewed and agreed with or any disagreements were addressed in the HPI. REVIEW OF SYSTEMS    (2-9 systems for level 4, 10 or more for level 5)     Review of Systems   Constitutional: Negative for activity change, appetite change, chills and fever. HENT: Negative for congestion and rhinorrhea. Respiratory: Negative for cough and shortness of breath. Cardiovascular: Negative for chest pain. Gastrointestinal: Negative for abdominal pain, diarrhea, nausea and vomiting. Genitourinary: Negative for difficulty urinating, dysuria and hematuria. Musculoskeletal: Positive for arthralgias. Neurological: Negative for weakness and numbness. Positives and Pertinent negatives as per HPI. Except as noted above in the ROS, all other systems were reviewed and negative. PAST MEDICAL HISTORY     Past Medical History:   Diagnosis Date    Asthma     COPD (chronic obstructive pulmonary disease) ([facility] [address]     Hyperlipidemia          SURGICAL HISTORY   History reviewed. No pertinent surgical history.       [address]       Current Discharge Medication List      CONTINUE these medications which have NOT CHANGED    Details   triamcinolone (KENALOG) 0.1 % cream APPLY TO AFFECTED AREA TWICE A DAY  Qty: 454 g, Refills: 1    Associated Diagnoses: Psoriasis      Fluticasone furoate-vilanterol (BREO ELLIPTA) 200-25 MCG/INH AEPB inhaler Inhale 1 puff into the lungs daily  Qty: 28 each, Refills: 11    Associated Diagnoses: Severe persistent asthma without complication; Chronic obstructive pulmonary disease, unspecified COPD type ([facility])      albuterol sulfate  (90 Base) MCG/ACT inhaler Inhale 2 puffs into the lungs every 4 hours as needed for Wheezing or Shortness of Breath  Qty: 8.5 Inhaler, Refills: 5    Comments: Pharmacy may dispense ANY albuterol product based on formulary, cost, or availability. Associated Diagnoses: Chronic obstructive pulmonary disease, unspecified COPD type ([facility] [address] Severe persistent asthma without complication      atorvastatin (LIPITOR) 40 MG tablet TAKE 1 TABLET BY MOUTH EVERY DAY AT NIGHT  Qty: 30 tablet, Refills: 5    Associated Diagnoses: Dyslipidemia      D3-1000 25 MCG (1000 UT) CAPS TAKE 1 CAPSULE BY MOUTH EVERY DAY  Qty: 30 capsule, Refills: 5    Comments: Take 3 capsules by mouth every day  Associated Diagnoses: Vitamin D deficiency               ALLERGIES     Patient has no known allergies. FAMILYHISTORY       Family History   Problem Relation Age of Onset    Heart Attack Father           SOCIAL HISTORY       Social History     Tobacco Use    Smoking status: Current Every Day Smoker     Packs/day: 1.00     Types: Cigars    Smokeless tobacco: Never Used   Vaping Use    Vaping Use: Former   Substance Use Topics    Alcohol use: Not Currently    Drug use: Never       SCREENINGS    [place] Coma Scale  Eye Opening: Spontaneous  Best Verbal Response: Oriented  Best Motor Response: Obeys commands  Snow Coma Scale Score: 15        PHYSICAL EXAM    (up to 7 for level 4, 8 or more for level 5)     ED Triage Vitals [03/18/22 1838]   BP Temp Temp Source Pulse Resp SpO2 Height Weight   106/85 99.1 °F (37.3 °C) Oral 102 18 96 % 5' 7\" (1.702 m) 197 lb 1.6 oz (89.4 kg)       Physical Exam  Vitals and nursing note reviewed. Constitutional:       Appearance: He is well-developed. He is not diaphoretic. HENT:      Head: Normocephalic and atraumatic. Right Ear: External ear normal.      Left Ear: External ear normal.      Nose: Nose normal.   Eyes:      General:         Right eye: No discharge. Left eye: No discharge. Neck:      Trachea: No tracheal deviation.    Cardiovascular:      Rate and Rhythm: Normal rate and regular rhythm. Pulmonary:      Effort: Pulmonary effort is normal. No respiratory distress. Breath sounds: Normal breath sounds. No wheezing or rales. Abdominal:      General: Bowel sounds are normal. There is no distension. Palpations: Abdomen is soft. Tenderness: There is no abdominal tenderness. There is no guarding. Musculoskeletal:         General: Normal range of motion. Cervical back: Normal range of motion and neck supple. Comments: Dorsum of the left foot appears to be a purplish/bluish hue. It is somewhat cool to the touch. He has delayed cap refill of approximately 5 to 7 seconds. unable to palpate tibialis pulse,or dorsalis pedis pulse. Is normal sensation to light touch. Full range of motion of all joints. No bony tenderness    The right foot is normal in color, with palpable 2+ dorsalis pedis and posterior tibialis pulses. Good cap refill. No bony tenderness. Neurovascularly intact   Skin:     General: Skin is warm and dry. Neurological:      Mental Status: He is alert and oriented to person, place, and time.    Psychiatric:         Behavior: Behavior normal.         DIAGNOSTIC RESULTS   LABS:    Labs Reviewed   CBC WITH AUTO DIFFERENTIAL - Abnormal; Notable for the following components:       Result Value    WBC 22.1 (*)     Neutrophils Absolute 16.7 (*)     Monocytes Absolute 1.5 (*)     All other components within normal limits   COMPREHENSIVE METABOLIC PANEL - Abnormal; Notable for the following components:    Glucose 115 (*)     All other components within normal limits   PROTIME-INR - Abnormal; Notable for the following components:    Protime 13.3 (*)     INR 1.17 (*)     All other components within normal limits   BRAIN NATRIURETIC PEPTIDE - Abnormal; Notable for the following components:    Pro- (*)     All other components within normal limits   CULTURE, BLOOD 1   CULTURE, BLOOD 2   APTT   TROPONIN   APTT   APTT   APTT   PROCALCITONIN URINALYSIS WITH REFLEX TO CULTURE   LIPASE       When ordered only abnormal lab results are displayed. All other labs were within normal range or not returned as of this dictation. EKG: When ordered, EKG's are interpreted by the Emergency Department Physician in the absence of a cardiologist.  Please see their note for interpretation of EKG. RADIOLOGY:   Non-plain film images such as CT, Ultrasound and MRI are read by the radiologist. Plain radiographic images are visualized and preliminarily interpreted by the ED Provider with the below findings:        Interpretation per the Radiologist below, if available at the time of this note:    CTA ABDOMINAL AORTA W BILAT RUNOFF W CONTRAST   Final Result   1. 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 Flow to the left   foot is demonstrated via the posterior tibial artery. 2.  Occlusion of the left common iliac artery with reconstitution at the   level of the external iliac artery by the inferior epigastric. 3.  Findings of mild focal pancreatitis in the tail is demonstrated. 4.  Diverticulosis. XR ANKLE LEFT (MIN 3 VIEWS)    Result Date: 3/14/2022  EXAMINATION: THREE XRAY VIEWS OF THE LEFT ANKLE 3/14/2022 11:23 am COMPARISON: None. HISTORY: ORDERING SYSTEM PROVIDED HISTORY: Injury of left lower extremity, initial encounter TECHNOLOGIST PROVIDED HISTORY: Reason for Exam: Injury of left lower extremity FINDINGS: The soft tissues are unremarkable. There is no fracture or dislocation. No focal destructive osseous lesion. No radiopaque foreign body is identified. Deformity of the distal tibia and fibula is consistent with a healed previous fractures. There is a small plantar calcaneal spur.      No acute osseous abnormality           PROCEDURES   Unless otherwise noted below, none     Procedures    CRITICAL CARE TIME   Critical Care  There was a high probability of life-threatening clinical deterioration in the patient's condition requiring my urgent intervention. Total critical care time with the patient was 45 minutes excluding separately reportable procedures. Critical care required due to patients near complete occlusion of the popliteal artery require vascular surgery consultation, heparin initiation, and admission      CONSULTS:  IP CONSULT TO VASCULAR SURGERY  IP CONSULT TO VASCULAR SURGERY      EMERGENCY DEPARTMENT COURSE and DIFFERENTIAL DIAGNOSIS/MDM:   Vitals:    Vitals:    03/18/22 2332 03/19/22 0000 03/19/22 0101 03/19/22 0208   BP: 129/75 (!) 142/78 (!) 140/81 (!) 142/82   Pulse: 89 90 92 86   Resp: 18 18 18 17   Temp:    98.3 °F (36.8 °C)   TempSrc:    Oral   SpO2: 94% 94% 97% 96%   Weight:    197 lb 3.2 oz (89.4 kg)   Height:    5' 7\" (1.702 m)       Patient was given the following medications:  Medications   heparin (porcine) injection 7,150 Units (has no administration in time range)   heparin (porcine) injection 3,580 Units (has no administration in time range)   heparin 25,000 units in dextrose 5% 250 mL (premix) infusion (18 Units/kg/hr × 89.4 kg IntraVENous Rate/Dose Verify 3/19/22 0200)   morphine injection 4 mg (4 mg IntraVENous Given 3/19/22 0044)   iopamidol (ISOVUE-370) 76 % injection 120 mL (120 mLs IntraVENous Given 3/18/22 2052)   heparin (porcine) injection 7,150 Units (7,150 Units IntraVENous Given 3/19/22 0047)   ondansetron (ZOFRAN) injection 4 mg (4 mg IntraVENous Given 3/19/22 0044)           Briefly, this is a 49-year-old male who presents to the emergency department today for evaluation for discoloration to the left foot patient states that he has been experiencing this for the past 3 weeks. He states that initially dog sat on his foot and he thought that it was sprained    Patient states that he went to the sports medicine clinic and was sent to the ED due to circulation issue. On examination, he does have a purplish/blue hue to the foot, with delayed cap refill. Unable to palpate any pulses.     CBC

## 2022-03-18 NOTE — ED PROVIDER NOTES
EKG is reviewed by myself. Dated today at 80. Rate 88. Sinus rhythm. Flat T's. No prior.      Fred German MD  03/18/22 1944

## 2022-03-19 PROBLEM — I99.8 ISCHEMIA OF LEFT LOWER EXTREMITY: Status: ACTIVE | Noted: 2022-03-19

## 2022-03-19 LAB
APTT: 139.7 SEC (ref 26.2–38.6)
APTT: 183.6 SEC (ref 26.2–38.6)
APTT: 45.5 SEC (ref 26.2–38.6)
BILIRUBIN URINE: NEGATIVE
BLOOD, URINE: NEGATIVE
CLARITY: CLEAR
COLOR: NORMAL
EKG ATRIAL RATE: 88 BPM
EKG DIAGNOSIS: NORMAL
EKG P AXIS: 28 DEGREES
EKG P-R INTERVAL: 124 MS
EKG Q-T INTERVAL: 336 MS
EKG QRS DURATION: 74 MS
EKG QTC CALCULATION (BAZETT): 406 MS
EKG R AXIS: 45 DEGREES
EKG T AXIS: 95 DEGREES
EKG VENTRICULAR RATE: 88 BPM
GLUCOSE URINE: NEGATIVE MG/DL
KETONES, URINE: NEGATIVE MG/DL
LEUKOCYTE ESTERASE, URINE: NEGATIVE
LIPASE: 713 U/L (ref 13–60)
MICROSCOPIC EXAMINATION: NORMAL
NITRITE, URINE: NEGATIVE
PH UA: 5.5 (ref 5–8)
PROCALCITONIN: 0.07 NG/ML (ref 0–0.15)
PROTEIN UA: NEGATIVE MG/DL
REASON FOR REJECTION: NORMAL
REJECTED TEST: NORMAL
SPECIFIC GRAVITY UA: 1.01 (ref 1–1.03)
URINE REFLEX TO CULTURE: NORMAL
URINE TYPE: NORMAL
UROBILINOGEN, URINE: 0.2 E.U./DL

## 2022-03-19 PROCEDURE — 99254 IP/OBS CNSLTJ NEW/EST MOD 60: CPT | Performed by: SURGERY

## 2022-03-19 PROCEDURE — 2580000003 HC RX 258: Performed by: NURSE PRACTITIONER

## 2022-03-19 PROCEDURE — 6370000000 HC RX 637 (ALT 250 FOR IP): Performed by: NURSE PRACTITIONER

## 2022-03-19 PROCEDURE — 6360000002 HC RX W HCPCS: Performed by: NURSE PRACTITIONER

## 2022-03-19 PROCEDURE — U0005 INFEC AGEN DETEC AMPLI PROBE: HCPCS

## 2022-03-19 PROCEDURE — 94640 AIRWAY INHALATION TREATMENT: CPT

## 2022-03-19 PROCEDURE — 94761 N-INVAS EAR/PLS OXIMETRY MLT: CPT

## 2022-03-19 PROCEDURE — 87040 BLOOD CULTURE FOR BACTERIA: CPT

## 2022-03-19 PROCEDURE — 81003 URINALYSIS AUTO W/O SCOPE: CPT

## 2022-03-19 PROCEDURE — 1200000000 HC SEMI PRIVATE

## 2022-03-19 PROCEDURE — 6360000002 HC RX W HCPCS: Performed by: PHYSICIAN ASSISTANT

## 2022-03-19 PROCEDURE — 2580000003 HC RX 258: Performed by: PHYSICIAN ASSISTANT

## 2022-03-19 PROCEDURE — 36415 COLL VENOUS BLD VENIPUNCTURE: CPT

## 2022-03-19 PROCEDURE — 2700000000 HC OXYGEN THERAPY PER DAY

## 2022-03-19 PROCEDURE — 6370000000 HC RX 637 (ALT 250 FOR IP): Performed by: INTERNAL MEDICINE

## 2022-03-19 PROCEDURE — 2580000003 HC RX 258: Performed by: PEDIATRICS

## 2022-03-19 PROCEDURE — 93010 ELECTROCARDIOGRAM REPORT: CPT | Performed by: INTERNAL MEDICINE

## 2022-03-19 PROCEDURE — 85730 THROMBOPLASTIN TIME PARTIAL: CPT

## 2022-03-19 PROCEDURE — U0003 INFECTIOUS AGENT DETECTION BY NUCLEIC ACID (DNA OR RNA); SEVERE ACUTE RESPIRATORY SYNDROME CORONAVIRUS 2 (SARS-COV-2) (CORONAVIRUS DISEASE [COVID-19]), AMPLIFIED PROBE TECHNIQUE, MAKING USE OF HIGH THROUGHPUT TECHNOLOGIES AS DESCRIBED BY CMS-2020-01-R: HCPCS

## 2022-03-19 RX ORDER — SODIUM CHLORIDE, SODIUM LACTATE, POTASSIUM CHLORIDE, CALCIUM CHLORIDE 600; 310; 30; 20 MG/100ML; MG/100ML; MG/100ML; MG/100ML
INJECTION, SOLUTION INTRAVENOUS CONTINUOUS
Status: DISCONTINUED | OUTPATIENT
Start: 2022-03-19 | End: 2022-03-22

## 2022-03-19 RX ORDER — HEPARIN SODIUM 1000 [USP'U]/ML
40 INJECTION, SOLUTION INTRAVENOUS; SUBCUTANEOUS PRN
Status: DISCONTINUED | OUTPATIENT
Start: 2022-03-19 | End: 2022-03-22

## 2022-03-19 RX ORDER — BUDESONIDE AND FORMOTEROL FUMARATE DIHYDRATE 160; 4.5 UG/1; UG/1
2 AEROSOL RESPIRATORY (INHALATION) 2 TIMES DAILY
Status: DISCONTINUED | OUTPATIENT
Start: 2022-03-19 | End: 2022-03-24 | Stop reason: HOSPADM

## 2022-03-19 RX ORDER — ATORVASTATIN CALCIUM 40 MG/1
40 TABLET, FILM COATED ORAL NIGHTLY
Status: DISCONTINUED | OUTPATIENT
Start: 2022-03-19 | End: 2022-03-24 | Stop reason: HOSPADM

## 2022-03-19 RX ORDER — ONDANSETRON 2 MG/ML
4 INJECTION INTRAMUSCULAR; INTRAVENOUS ONCE
Status: COMPLETED | OUTPATIENT
Start: 2022-03-19 | End: 2022-03-19

## 2022-03-19 RX ORDER — ALBUTEROL SULFATE 90 UG/1
2 AEROSOL, METERED RESPIRATORY (INHALATION) EVERY 4 HOURS PRN
Status: DISCONTINUED | OUTPATIENT
Start: 2022-03-19 | End: 2022-03-24 | Stop reason: HOSPADM

## 2022-03-19 RX ORDER — SODIUM CHLORIDE 0.9 % (FLUSH) 0.9 %
5-40 SYRINGE (ML) INJECTION EVERY 12 HOURS SCHEDULED
Status: DISCONTINUED | OUTPATIENT
Start: 2022-03-19 | End: 2022-03-21

## 2022-03-19 RX ORDER — ONDANSETRON 2 MG/ML
4 INJECTION INTRAMUSCULAR; INTRAVENOUS EVERY 6 HOURS PRN
Status: DISCONTINUED | OUTPATIENT
Start: 2022-03-19 | End: 2022-03-21

## 2022-03-19 RX ORDER — SODIUM CHLORIDE 9 MG/ML
INJECTION, SOLUTION INTRAVENOUS CONTINUOUS
Status: DISCONTINUED | OUTPATIENT
Start: 2022-03-19 | End: 2022-03-19

## 2022-03-19 RX ORDER — HYDROCODONE BITARTRATE AND ACETAMINOPHEN 5; 325 MG/1; MG/1
1 TABLET ORAL EVERY 6 HOURS PRN
Status: DISCONTINUED | OUTPATIENT
Start: 2022-03-19 | End: 2022-03-24 | Stop reason: HOSPADM

## 2022-03-19 RX ORDER — SODIUM CHLORIDE 9 MG/ML
25 INJECTION, SOLUTION INTRAVENOUS PRN
Status: DISCONTINUED | OUTPATIENT
Start: 2022-03-19 | End: 2022-03-21

## 2022-03-19 RX ORDER — MORPHINE SULFATE 4 MG/ML
4 INJECTION, SOLUTION INTRAMUSCULAR; INTRAVENOUS EVERY 30 MIN PRN
Status: DISCONTINUED | OUTPATIENT
Start: 2022-03-19 | End: 2022-03-19

## 2022-03-19 RX ORDER — ONDANSETRON 4 MG/1
4 TABLET, ORALLY DISINTEGRATING ORAL EVERY 8 HOURS PRN
Status: DISCONTINUED | OUTPATIENT
Start: 2022-03-19 | End: 2022-03-21

## 2022-03-19 RX ORDER — HEPARIN SODIUM 1000 [USP'U]/ML
80 INJECTION, SOLUTION INTRAVENOUS; SUBCUTANEOUS ONCE
Status: COMPLETED | OUTPATIENT
Start: 2022-03-19 | End: 2022-03-19

## 2022-03-19 RX ORDER — IPRATROPIUM BROMIDE AND ALBUTEROL SULFATE 2.5; .5 MG/3ML; MG/3ML
1 SOLUTION RESPIRATORY (INHALATION) EVERY 4 HOURS PRN
Status: DISCONTINUED | OUTPATIENT
Start: 2022-03-19 | End: 2022-03-24 | Stop reason: HOSPADM

## 2022-03-19 RX ORDER — ACETAMINOPHEN 650 MG/1
650 SUPPOSITORY RECTAL EVERY 6 HOURS PRN
Status: DISCONTINUED | OUTPATIENT
Start: 2022-03-19 | End: 2022-03-24 | Stop reason: HOSPADM

## 2022-03-19 RX ORDER — IPRATROPIUM BROMIDE AND ALBUTEROL SULFATE 2.5; .5 MG/3ML; MG/3ML
1 SOLUTION RESPIRATORY (INHALATION) 2 TIMES DAILY
Status: DISCONTINUED | OUTPATIENT
Start: 2022-03-19 | End: 2022-03-24 | Stop reason: HOSPADM

## 2022-03-19 RX ORDER — ACETAMINOPHEN 325 MG/1
650 TABLET ORAL EVERY 6 HOURS PRN
Status: DISCONTINUED | OUTPATIENT
Start: 2022-03-19 | End: 2022-03-23 | Stop reason: DRUGHIGH

## 2022-03-19 RX ORDER — POLYETHYLENE GLYCOL 3350 17 G/17G
17 POWDER, FOR SOLUTION ORAL DAILY PRN
Status: DISCONTINUED | OUTPATIENT
Start: 2022-03-19 | End: 2022-03-24 | Stop reason: HOSPADM

## 2022-03-19 RX ORDER — SODIUM CHLORIDE 0.9 % (FLUSH) 0.9 %
5-40 SYRINGE (ML) INJECTION PRN
Status: DISCONTINUED | OUTPATIENT
Start: 2022-03-19 | End: 2022-03-21

## 2022-03-19 RX ORDER — MORPHINE SULFATE 2 MG/ML
2 INJECTION, SOLUTION INTRAMUSCULAR; INTRAVENOUS
Status: DISCONTINUED | OUTPATIENT
Start: 2022-03-19 | End: 2022-03-21

## 2022-03-19 RX ORDER — HEPARIN SODIUM 10000 [USP'U]/100ML
5-30 INJECTION, SOLUTION INTRAVENOUS CONTINUOUS
Status: DISCONTINUED | OUTPATIENT
Start: 2022-03-19 | End: 2022-03-22

## 2022-03-19 RX ORDER — HEPARIN SODIUM 1000 [USP'U]/ML
80 INJECTION, SOLUTION INTRAVENOUS; SUBCUTANEOUS PRN
Status: DISCONTINUED | OUTPATIENT
Start: 2022-03-19 | End: 2022-03-22

## 2022-03-19 RX ADMIN — IPRATROPIUM BROMIDE AND ALBUTEROL SULFATE 1 AMPULE: .5; 3 SOLUTION RESPIRATORY (INHALATION) at 08:16

## 2022-03-19 RX ADMIN — MORPHINE SULFATE 4 MG: 4 INJECTION INTRAVENOUS at 00:44

## 2022-03-19 RX ADMIN — MORPHINE SULFATE 2 MG: 2 INJECTION, SOLUTION INTRAMUSCULAR; INTRAVENOUS at 08:08

## 2022-03-19 RX ADMIN — HEPARIN SODIUM 7150 UNITS: 1000 INJECTION INTRAVENOUS; SUBCUTANEOUS at 00:47

## 2022-03-19 RX ADMIN — SODIUM CHLORIDE: 9 INJECTION, SOLUTION INTRAVENOUS at 02:51

## 2022-03-19 RX ADMIN — HYDROCODONE BITARTRATE AND ACETAMINOPHEN 1 TABLET: 5; 325 TABLET ORAL at 13:06

## 2022-03-19 RX ADMIN — ATORVASTATIN CALCIUM 40 MG: 40 TABLET, FILM COATED ORAL at 20:53

## 2022-03-19 RX ADMIN — Medication 15 UNITS/KG/HR: at 08:00

## 2022-03-19 RX ADMIN — Medication 18 UNITS/KG/HR: at 00:49

## 2022-03-19 RX ADMIN — SODIUM CHLORIDE, POTASSIUM CHLORIDE, SODIUM LACTATE AND CALCIUM CHLORIDE: 600; 310; 30; 20 INJECTION, SOLUTION INTRAVENOUS at 13:24

## 2022-03-19 RX ADMIN — Medication 2 PUFF: at 19:45

## 2022-03-19 RX ADMIN — HYDROCODONE BITARTRATE AND ACETAMINOPHEN 1 TABLET: 5; 325 TABLET ORAL at 04:17

## 2022-03-19 RX ADMIN — IPRATROPIUM BROMIDE AND ALBUTEROL SULFATE 1 AMPULE: .5; 3 SOLUTION RESPIRATORY (INHALATION) at 19:45

## 2022-03-19 RX ADMIN — HYDROCODONE BITARTRATE AND ACETAMINOPHEN 1 TABLET: 5; 325 TABLET ORAL at 20:53

## 2022-03-19 RX ADMIN — ONDANSETRON 4 MG: 2 INJECTION INTRAMUSCULAR; INTRAVENOUS at 00:44

## 2022-03-19 RX ADMIN — SODIUM CHLORIDE, PRESERVATIVE FREE 10 ML: 5 INJECTION INTRAVENOUS at 20:40

## 2022-03-19 RX ADMIN — Medication 2 PUFF: at 08:17

## 2022-03-19 RX ADMIN — SODIUM CHLORIDE, POTASSIUM CHLORIDE, SODIUM LACTATE AND CALCIUM CHLORIDE: 600; 310; 30; 20 INJECTION, SOLUTION INTRAVENOUS at 19:08

## 2022-03-19 ASSESSMENT — PAIN SCALES - GENERAL
PAINLEVEL_OUTOF10: 6
PAINLEVEL_OUTOF10: 6
PAINLEVEL_OUTOF10: 1
PAINLEVEL_OUTOF10: 0
PAINLEVEL_OUTOF10: 8
PAINLEVEL_OUTOF10: 0
PAINLEVEL_OUTOF10: 5
PAINLEVEL_OUTOF10: 6
PAINLEVEL_OUTOF10: 7

## 2022-03-19 ASSESSMENT — PAIN DESCRIPTION - PAIN TYPE
TYPE: ACUTE PAIN

## 2022-03-19 ASSESSMENT — PAIN DESCRIPTION - ONSET
ONSET: ON-GOING

## 2022-03-19 ASSESSMENT — ENCOUNTER SYMPTOMS
COLOR CHANGE: 1
RESPIRATORY NEGATIVE: 1
GASTROINTESTINAL NEGATIVE: 1
EYES NEGATIVE: 1

## 2022-03-19 ASSESSMENT — PAIN DESCRIPTION - FREQUENCY
FREQUENCY: CONTINUOUS

## 2022-03-19 ASSESSMENT — PAIN DESCRIPTION - DESCRIPTORS
DESCRIPTORS: SHARP;STABBING;ACHING
DESCRIPTORS: SORE;SHOOTING;ACHING
DESCRIPTORS: SORE;SHARP;SHOOTING

## 2022-03-19 ASSESSMENT — PAIN DESCRIPTION - LOCATION
LOCATION: FOOT;ANKLE

## 2022-03-19 ASSESSMENT — PAIN DESCRIPTION - ORIENTATION
ORIENTATION: LEFT

## 2022-03-19 ASSESSMENT — PAIN - FUNCTIONAL ASSESSMENT
PAIN_FUNCTIONAL_ASSESSMENT: PREVENTS OR INTERFERES SOME ACTIVE ACTIVITIES AND ADLS
PAIN_FUNCTIONAL_ASSESSMENT: PREVENTS OR INTERFERES SOME ACTIVE ACTIVITIES AND ADLS

## 2022-03-19 ASSESSMENT — PAIN DESCRIPTION - PROGRESSION
CLINICAL_PROGRESSION: GRADUALLY WORSENING
CLINICAL_PROGRESSION: NOT CHANGED
CLINICAL_PROGRESSION: NOT CHANGED

## 2022-03-19 NOTE — CONSULTS
Vascular Surgery Consultation    Geraldine Holt  [id number]  3/19/2022  1970    Date of Admission:  3/18/2022  6:36 PM  Date of Consultation:  3/19/2022    PCP:  ORLANDO Nobles CNP       Chief Complaint: Left foot pain    History of Present Illness:   Geraldine Holt is a 46 y.o. male who presented to the ED with several week h/o left foot pain. He had visited PCP several times for evaluation, and believed he had a musculoskeletal issue. Has previously injury to left foot from [facility], but otherwise, walks without any assistance and is independent. Reports that his foot becomes \"white\" and painful when he elevates it. Pain relieved by walking around. Denies prior claudication symptoms. He is a long-time smoker. Denies diabetes. Lives with wife, currently off work but works more in the summertime. CTA runoff showed total thrombotic occlusion of the left common iliac and proximal EIA as well as the distal popliteal artery. He had reconstitution of the peroneal and PT, which was patent to the ankle. Anterior tibial artery appeared completely occluded just past the origin. Denies any cardiac history. Denies Cp, SOA or TENORIO. I personally reviewed images of the following study:  CTA ABDOMINAL AORTA W BILAT RUNOFF W CONTRAST  3/18/22     Past Medical History:  Past Medical History:   Diagnosis Date    Asthma     COPD (chronic obstructive pulmonary disease) ([facility] [address]     Hyperlipidemia      Past Surgical History:  History reviewed. No pertinent surgical history. Home Medications:   Prior to Admission medications    Medication Sig Start Date End Date Taking?  Authorizing Provider   triamcinolone (KENALOG) 0.1 % cream APPLY TO AFFECTED AREA TWICE A DAY 3/3/22   ORLANDO Nobles CNP   Fluticasone furoate-vilanterol (BREO ELLIPTA) 200-25 MCG/INH AEPB inhaler Inhale 1 puff into the lungs daily 1/20/22   ORLANDO Nobles CNP   albuterol sulfate  (90 Base) MCG/ACT inhaler Inhale 2 puffs into the lungs every 4 hours as needed for Wheezing or Shortness of Breath 7/8/21   Juline Pump, APRN - CNP   atorvastatin (LIPITOR) 40 MG tablet TAKE 1 TABLET BY MOUTH EVERY DAY AT NIGHT 7/20/20   Luna PumpORLANDO - EDNA      Facility Administered Medications:   Ailin Estrada atorvastatin  40 mg Oral Nightly    budesonide-formoterol  2 puff Inhalation BID    sodium chloride flush  5-40 mL IntraVENous 2 times per day    ipratropium-albuterol  1 ampule Inhalation BID     Allergies:  Patient has no known allergies. Social History:      Social History     Socioeconomic History    Marital status: Single     Spouse name: Not on file    Number of children: Not on file    Years of education: Not on file    Highest education level: Not on file   Occupational History    Not on file   Tobacco Use    Smoking status: Current Every Day Smoker     Packs/day: 1.00     Types: Cigars    Smokeless tobacco: Never Used   Vaping Use    Vaping Use: Former   Substance and Sexual Activity    Alcohol use: Not Currently    Drug use: Never    Sexual activity: Not Currently   Other Topics Concern    Not on file   Social History Narrative    Not on file     Social Determinants of Health     Financial Resource Strain:     Difficulty of Paying Living Expenses: Not on file   Food Insecurity:     Worried About [address] in the Last Year: Not on file    [address] in the Last Year: Not on file   Transportation Needs:     Lack of Transportation (Medical): Not on file    Lack of Transportation (Non-Medical):  Not on file   Physical Activity:     Days of Exercise per Week: Not on file    Minutes of Exercise per Session: Not on file   Stress:     Feeling of Stress : Not on file   Social Connections:     Frequency of Communication with Friends and Family: Not on file    Frequency of Social Gatherings with Friends and Family: Not on file    Attends Rastafarian Services: Not on file  Active Member of Clubs or Organizations: Not on file    Attends Club or Organization Meetings: Not on file    Marital Status: Not on file   Intimate Partner Violence:     Fear of Current or Ex-Partner: Not on file    Emotionally Abused: Not on file    Physically Abused: Not on file    Sexually Abused: Not on file   Housing Stability:     Unable to Pay for Housing in the Last Year: Not on file    Number of [place] in the Last Year: Not on file    Unstable Housing in the Last Year: Not on file     Review of Systems:  Pertinent positive and negative items are noted in the HPI. A comprehensive review of all other symptoms is otherwise negative. Physical Examination:    BP (!) 147/74   Pulse 96   Temp 98.5 °F (36.9 °C) (Oral)   Resp 18   Ht 5' 7\" (1.702 m)   Wt 197 lb 3.2 oz (89.4 kg)   SpO2 95%   BMI 30.89 kg/m²        Admission Weight: 197 lb 1.6 oz (89.4 kg)     General appearance: AAOX3, well-developed and well-nourished, in no acute distress, appears older than stated age. Pleasant, answers all questions appropriately  Eyes: non-icteric  Neck: supple, no masses, no JVD. Carotids 2+ without bruit. Respiratory: CTA B; no w/r/r  Cardiovascular: RRR no m/r/g  Pulses:    carotid brachial radial femoral popliteal DP PT   RIGHT 2 2 2 2 2 2 2   LEFT 2 2 2 monophas monophas 0 Faint, mono   GI: Abdomen soft, non-tender. BS normal. No masses,  No organomegaly  Musculoskeletal: normal range of motion, no joint swelling, deformity or tenderness  Extremities: Left foot with obvious palor on elevation; +dependent rubor. No cyanosis or mottling. Cap refill 2-3 seconds when dependent. No ulcers. Right leg pulses easily palpable. Left pedal pulses not palpable and difficult to doppler.     Skin: warm and dry, no rash or erythema  Neuro/psychiatric: Normal, full range of affect    Labs:   CBC:   Recent Labs     03/18/22 1945   WBC 22.1*   HGB 15.8   HCT 46.3   MCV 98.5        BMP:   Recent

## 2022-03-19 NOTE — ED PROVIDER NOTES
I independently performed a history and physical on [employer]. I personally saw the patient and performed a substantive portion of the visit including all aspects of the medical decision making. Briefly, this is a 46 y.o. male here for left foot pain. Has been ongoing intermittently for the past 3 weeks. Started after a dog sat on his foot. He currently has normal sensation in his leg. He did have numbness before. He was seen by a doc And given prednisone which she thinks improved his symptoms. The foot is darker in color compared to the right foot. No fevers or chills. No abdominal pain. Ambulating without issue. Pain is currently 0 out of 10. On exam,   General: Patient is in no acute distress  Skin: No cyanosis  HEENT: Moist mucous membranes  Heart: Regular rate, regular rhythm  Lung: No respiratory distress  Abdomen: Soft, nontender  Neuro: Moving all extremities, no facial droop, no slurred speech, answers questions appropriately  Weak DP pulse left foot. Capillary refill 5 to 6 seconds left foot, 2 seconds right foot. Normal sensation light palpation bilateral lower extremities. 5 out of 5 hip, knee, ankle, big toe flexor/extensor strength. Screenings   [place] Coma Scale  Eye Opening: Spontaneous  Best Verbal Response: Oriented  Best Motor Response: Obeys commands  [place] Coma Scale Score: 15        MDM  Briefly, this is a 46 y.o. male here for left foot pain intermittently for the past 3 weeks that is now resolved. Has thready left DP pulse and slow capillary refill. Likely vasculopathy. Normal nerve function. No signs of cellulitis. No tenderness on my exam to indicate anything broken. He is well-appearing with normal vitals and no pain at this time. He is ambulatory. Will obtain CT with runoffs.    ----------    CT showed evidence of focal pancreatitis. Patient has no abdominal pain or tenderness therefore I believe that this is incidental finding.   I do not believe that he would benefit from admission for bowel rest for this and I think that this can be followed up as an outpatient    ----------  CT also showed evidence of left popliteal occlusion, positive flow in left dorsalis pedis, and left common iliac occlusion with distal reconstitution. Discussed with vascular surgery. They request heparin administration, admission    ----------      I personally saw the patient and independently provided 32 minutes of non concurrent critical care out of the total shared critical care time provided. Patient Referrals:  No follow-up provider specified. Discharge Medications:  New Prescriptions    No medications on file       FINAL IMPRESSION  1. Left foot pain    2. Left popliteal artery occlusion (HCC)        Blood pressure 131/81, pulse 94, temperature 99.1 °F (37.3 °C), temperature source Oral, resp. rate 18, height 5' 7\" (1.702 m), weight 197 lb 1.6 oz (89.4 kg), SpO2 95 %. For further details of Mati Murphy emergency department encounter, please see documentation by advanced practice provider, Deirdre Payne MD  03/19/22 2177

## 2022-03-19 NOTE — ED NOTES
Dr. Tristan Jordan at bedside speaking with pt about all results.      Concetta Chappell RN  03/18/22 2188

## 2022-03-19 NOTE — H&P
HOSPITALISTS HISTORY AND PHYSICAL    3/19/2022 1:14 AM    Patient Information:  Brennon Norton is a 46 y.o. male [id number]  PCP:  ORLANDO Chaidez CNP (Tel: [phone] )    Chief complaint:    Chief Complaint   Patient presents with    Foot Pain     came from [facility] - needs doppler studies for decreased circulation on that foot; (+) numbness        History of Present Illness:  Orpha Snellen is a 46 y.o. male hx HLD, tobacco use, and COPD. Presents the emergency room for left foot pain for the last 3 weeks. He states his dog was sitting on his leg 3 weeks ago and shortly after noticed pain on the top of his foot. He saw his PCP and was prescribed 2 rounds of steroids pain did briefly improve with the steroids but then returned after the taper. He followed up with orthopedics last evening and was sent to the emergency room for concern for ischemia, pedal pulses on left foot were not palpable. History obtained from patient    REVIEW OF SYSTEMS:   Review of Systems   Constitutional: Negative. HENT: Negative. Eyes: Negative. Respiratory: Negative. Cardiovascular: Negative. Gastrointestinal: Negative. Endocrine: Negative. Genitourinary: Negative. Musculoskeletal:        Left foot pain    Skin: Positive for color change and pallor. Neurological: Negative. Hematological: Negative. Psychiatric/Behavioral: Negative. All other systems reviewed and are negative. Past Medical History:   has a past medical history of Asthma, COPD (chronic obstructive pulmonary disease) ([facility] [address] and Hyperlipidemia. Past Surgical History:   has no past surgical history on file. Medications:  No current facility-administered medications on file prior to encounter.      Current Outpatient Medications on File Prior to Encounter   Medication Sig Dispense Refill    triamcinolone (KENALOG) 0.1 % cream APPLY TO AFFECTED AREA TWICE A  g 1    Fluticasone furoate-vilanterol (BREO ELLIPTA) 200-25 MCG/INH AEPB inhaler Inhale 1 puff into the lungs daily 28 each 11    albuterol sulfate  (90 Base) MCG/ACT inhaler Inhale 2 puffs into the lungs every 4 hours as needed for Wheezing or Shortness of Breath 8.5 Inhaler 5    atorvastatin (LIPITOR) 40 MG tablet TAKE 1 TABLET BY MOUTH EVERY DAY AT NIGHT 30 tablet 5    D3-1000 25 MCG (1000 UT) CAPS TAKE 1 CAPSULE BY MOUTH EVERY DAY 30 capsule 5       Allergies:  No Known Allergies     Social History:  Patient Lives alone   reports that he has been smoking cigars. He has been smoking about 1.00 pack per day. He has never used smokeless tobacco. He reports previous alcohol use. He reports that he does not use drugs. Family History:  family history includes Heart Attack in his father. ,     Physical Exam:  BP (!) 142/78   Pulse 90   Temp 99.1 °F (37.3 °C) (Oral)   Resp 18   Ht 5' 7\" (1.702 m)   Wt 197 lb 1.6 oz (89.4 kg)   SpO2 94%   BMI 30.87 kg/m²   Physical Exam  Vitals and nursing note reviewed. Constitutional:       General: He is not in acute distress. Appearance: Normal appearance. He is not ill-appearing. HENT:      Head: Normocephalic. Nose: Nose normal.      Mouth/Throat:      Mouth: Mucous membranes are moist.   Eyes:      Pupils: Pupils are equal, round, and reactive to light. Cardiovascular:      Rate and Rhythm: Normal rate and regular rhythm. Heart sounds: No murmur heard. Pulmonary:      Effort: Pulmonary effort is normal. No respiratory distress. Breath sounds: Wheezing present. Abdominal:      General: Abdomen is flat. Bowel sounds are normal. There is no distension. Palpations: Abdomen is soft. Tenderness: There is no abdominal tenderness. Musculoskeletal:         General: Normal range of motion. Cervical back: Normal range of motion. Right lower leg: No edema. Left lower leg: No edema. Comments: Left foot and anterior Lower extremity cool/pale to touch compared to right, unable to palpate pedal pulse. Right pedal pulse 2+. Skin:     General: Skin is warm and dry. Neurological:      General: No focal deficit present. Mental Status: He is alert and oriented to person, place, and time. Cranial Nerves: No cranial nerve deficit. Motor: No weakness. Psychiatric:         Mood and Affect: Mood normal.         Behavior: Behavior normal.         Thought Content: Thought content normal.         Labs:  CBC:   Lab Results   Component Value Date    WBC 22.1 03/18/2022    RBC 4.70 03/18/2022    HGB 15.8 03/18/2022    HCT 46.3 03/18/2022    MCV 98.5 03/18/2022    MCH 33.5 03/18/2022    MCHC 34.0 03/18/2022    RDW 14.3 03/18/2022     03/18/2022    MPV 7.9 03/18/2022     BMP:    Lab Results   Component Value Date     03/18/2022    K 4.0 03/18/2022    CL 99 03/18/2022    CO2 25 03/18/2022    BUN 17 03/18/2022    CREATININE 0.9 03/18/2022    CALCIUM 9.3 03/18/2022    GFRAA >60 03/18/2022    LABGLOM >60 03/18/2022    GLUCOSE 115 03/18/2022     CTA ABDOMINAL AORTA W BILAT RUNOFF W CONTRAST   Final Result   1. 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 Flow to the left   foot is demonstrated via the posterior tibial artery. 2.  Occlusion of the left common iliac artery with reconstitution at the   level of the external iliac artery by the inferior epigastric. 3.  Findings of mild focal pancreatitis in the tail is demonstrated. 4.  Diverticulosis. Chest Xray:   EKG:    I visualized CXR images and EKG strips    Problem List  Active Problems:    * No active hospital problems. *  Resolved Problems:    * No resolved hospital problems. *        Assessment/Plan:   1.  Left Foot Pain with Ischemia, Near complete occlusion of Left popliteal artery and occulusion of left common iliac artery  Patient will be admitted inpatient with telemetry monitoring  Neurovascular checks  Vascular surgery was consulted in the emergency room recommended IV heparin  Patient will be kept n.p.o.  IV and oral pain medicine as needed  CBC BMP in the morning  Continue statin      2. Leukocytosis  Recently on steroids, afebrile  Check blood cx  Urine cx  procalcitonin  No indication for antibiotics    3. Tobacco Dependent  Smoking cessation    4. COPD no exacerbation  Continue home inhalers. CT ? Pancreatitis, patient denies abdominal pain, check lipase, NPO    DVT prophylaxis Heparin   Code status Full   Diet NPO  IV access peripheral   Dumas Catheter none    Admit as inpatient. I anticipate hospitalization spanning more than two midnights for investigation and treatment of the above medically necessary diagnoses. Please note that some part of this chart was generated using Dragon dictation software. Although every effort was made to ensure the accuracy of this automated transcription, some errors in transcription may have occurred inadvertently. If you may need any clarification, please do not hesitate to contact me through [facility].        Dixon Ceja, ORLANDO - CNP    3/19/2022 1:14 AM

## 2022-03-19 NOTE — ED NOTES
Pt transferred to room  In stable condition. Heparin gtt rate verified. All belongings with pt. Care transferred.      Martha Tsai RN  03/19/22 0915

## 2022-03-19 NOTE — ED NOTES
Per pharmacy, aptt due at this time is not needed since one was resulted at 80.      Agustin Germain RN  03/19/22 0392

## 2022-03-19 NOTE — RT PROTOCOL NOTE
or increased work of breathing using Per Protocol order mode. 4-6 - enter or revise RT Bronchodilator order(s) to two equivalent RT bronchodilator orders with one order with BID Frequency and one order with Frequency of every 4 hours PRN wheezing or increased work of breathing using Per Protocol order mode. 7-10 - enter or revise RT Bronchodilator order(s) to two equivalent RT bronchodilator orders with one order with TID Frequency and one order with Frequency of every 4 hours PRN wheezing or increased work of breathing using Per Protocol order mode. 11-13 - enter or revise RT Bronchodilator order(s) to one equivalent RT bronchodilator order with QID Frequency and an Albuterol order with Frequency of every 4 hours PRN wheezing or increased work of breathing using Per Protocol order mode. Greater than 13 - enter or revise RT Bronchodilator order(s) to one equivalent RT bronchodilator order with every 4 hours Frequency and an Albuterol order with Frequency of every 2 hours PRN wheezing or increased work of breathing using Per Protocol order mode. RT to enter RT Home Evaluation for COPD & MDI Assessment order using Per Protocol order mode.     Electronically signed by Nolan Duarte RCP on 3/19/2022 at 5:51 AM

## 2022-03-19 NOTE — RT PROTOCOL NOTE
RT Inhaler-Nebulizer Bronchodilator Protocol Note    There is a bronchodilator order in the chart from a provider indicating to follow the RT Bronchodilator Protocol and there is an Initiate RT Inhaler-Nebulizer Bronchodilator Protocol order as well (see protocol at bottom of note). CXR Findings:  No results found. The findings from the last RT Protocol Assessment were as follows:   History Pulmonary Disease: Smoker 15 pack years or more  Respiratory Pattern: Regular pattern and RR 12-20 bpm  Breath Sounds: Slightly diminished and/or crackles  Cough: Strong, spontaneous, non-productive  Indication for Bronchodilator Therapy: Decreased or absent breath sounds  Bronchodilator Assessment Score: 3    Aerosolized bronchodilator medication orders have been revised according to the RT Inhaler-Nebulizer Bronchodilator Protocol below. Respiratory Therapist to perform RT Therapy Protocol Assessment initially then follow the protocol. Repeat RT Therapy Protocol Assessment PRN for score 0-3 or on second treatment, BID, and PRN for scores above 3. No Indications - adjust the frequency to every 6 hours PRN wheezing or bronchospasm, if no treatments needed after 48 hours then discontinue using Per Protocol order mode. If indication present, adjust the RT bronchodilator orders based on the Bronchodilator Assessment Score as indicated below. Use Inhaler orders unless patient has one or more of the following: on home nebulizer, not able to hold breath for 10 seconds, is not alert and oriented, cannot activate and use MDI correctly, or respiratory rate 25 breaths per minute or more, then use the equivalent nebulizer order(s) with same Frequency and PRN reasons based on the score. If a patient is on this medication at home then do not decrease Frequency below that used at home.     0-3 - enter or revise RT bronchodilator order(s) to equivalent RT Bronchodilator order with Frequency of every 4 hours PRN for wheezing or increased work of breathing using Per Protocol order mode. 4-6 - enter or revise RT Bronchodilator order(s) to two equivalent RT bronchodilator orders with one order with BID Frequency and one order with Frequency of every 4 hours PRN wheezing or increased work of breathing using Per Protocol order mode. 7-10 - enter or revise RT Bronchodilator order(s) to two equivalent RT bronchodilator orders with one order with TID Frequency and one order with Frequency of every 4 hours PRN wheezing or increased work of breathing using Per Protocol order mode. 11-13 - enter or revise RT Bronchodilator order(s) to one equivalent RT bronchodilator order with QID Frequency and an Albuterol order with Frequency of every 4 hours PRN wheezing or increased work of breathing using Per Protocol order mode. Greater than 13 - enter or revise RT Bronchodilator order(s) to one equivalent RT bronchodilator order with every 4 hours Frequency and an Albuterol order with Frequency of every 2 hours PRN wheezing or increased work of breathing using Per Protocol order mode. RT to enter RT Home Evaluation for COPD & MDI Assessment order using Per Protocol order mode.     Electronically signed by Theresa Lopez RCP on 3/19/2022 at 5:59 AM

## 2022-03-19 NOTE — PROGRESS NOTES
Hospitalist Progress Note      PCP: ORLANDO Calderon - CNP    Date of Admission: 3/18/2022    Chief Complaint:  Leg pain     Hospital Course:     3/19/22 - reports pain controlled, reports smoking about 1 ppd, tolerating PO ok, denies constipation, has wheezing and hx of COPD, NPO at midnight Monday morning for procedure            Medications:  Reviewed    Infusion Medications    heparin (PORCINE) Infusion 15 Units/kg/hr (03/19/22 0800)    sodium chloride      sodium chloride 75 mL/hr at 03/19/22 0251     Scheduled Medications    atorvastatin  40 mg Oral Nightly    budesonide-formoterol  2 puff Inhalation BID    sodium chloride flush  5-40 mL IntraVENous 2 times per day    ipratropium-albuterol  1 ampule Inhalation BID     PRN Meds: heparin (porcine), heparin (porcine), albuterol sulfate HFA, sodium chloride flush, sodium chloride, ondansetron **OR** ondansetron, polyethylene glycol, acetaminophen **OR** acetaminophen, HYDROcodone 5 mg - acetaminophen, perflutren lipid microspheres, morphine      Intake/Output Summary (Last 24 hours) at 3/19/2022 1152  Last data filed at 3/19/2022 0754  Gross per 24 hour   Intake 480 ml   Output --   Net 480 ml       Physical Exam Performed:    BP (!) 147/74   Pulse 96   Temp 98.5 °F (36.9 °C) (Oral)   Resp 18   Ht 5' 7\" (1.702 m)   Wt 197 lb 3.2 oz (89.4 kg)   SpO2 95%   BMI 30.89 kg/m²     General appearance: No apparent distress, appears stated age and cooperative. HEENT: Pupils equal, round, and reactive to light. Conjunctivae/corneas clear. Neck: Supple, with full range of motion. No jugular venous distention. Trachea midline. Respiratory:    - inspiratory and expiratory wheezing     Cardiovascular: Regular rate and rhythm with normal S1/S2 without murmurs, rubs or gallops. Abdomen: Soft, non-tender, non-distended   Musculoskeletal:  No cyanosis or edema bilaterally. Full range of motion without deformity.   + clubbing in his fingers apparent Skin: Skin color, texture, turgor normal.    Neurologic:    Speech clear   No facial droop   Moves ext x 4   Psychiatric: Alert and oriented,   Capillary Refill: Brisk,3 seconds, normal   Peripheral Pulses: +2 palpable, equal bilaterally     Anterior tibial pulses palpable bilaterally, and cap refill in the feet < 2 seconds, extremities are warm to touch       Labs:   Recent Labs     03/18/22 1945   WBC 22.1*   HGB 15.8   HCT 46.3        Recent Labs     03/18/22 1945      K 4.0   CL 99   CO2 25   BUN 17   CREATININE 0.9   CALCIUM 9.3     Recent Labs     03/18/22 1945   AST 29   ALT 35   BILITOT 0.3   ALKPHOS 68     Recent Labs     03/18/22 1945   INR 1.17*     Recent Labs     03/18/22 1945   TROPONINI <0.01       Urinalysis:      Lab Results   Component Value Date    NITRU Negative 03/19/2022    BLOODU Negative 03/19/2022    SPECGRAV 1.010 03/19/2022    GLUCOSEU Negative 03/19/2022       Radiology:  CTA ABDOMINAL AORTA W BILAT RUNOFF W CONTRAST   Final Result   1. 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 Flow to the left   foot is demonstrated via the posterior tibial artery. 2.  Occlusion of the left common iliac artery with reconstitution at the   level of the external iliac artery by the inferior epigastric. 3.  Findings of mild focal pancreatitis in the tail is demonstrated. 4.  Diverticulosis. Assessment/Plan:    Active Hospital Problems    Diagnosis     Ischemia of left lower extremity [I99.8]      Rob Gibson is a 46 y.o. male       1.  Left Foot Pain with Ischemia, Near complete occlusion of Left popliteal artery and occulusion of left common iliac artery  - admitted inpatient with telemetry monitoring  - Neurovascular checks  - Vascular surgery was consulted in the emergency room recommended IV heparin (started)- procedure planned for Monday    - diet ok for now, NPO Monday morning at midnight   - IV and oral pain medicine as needed  - daily CBC / BMP    - Continue atorvastatin 40 mg po daily         2. Leukocytosis  - Recently on steroids, afebrile, suspected from stress response with leg ischemia   - blood cx collected 3/19/22 - pending   - Urinalysis 3/19/22 negative   - procalcitonin resulted at only 0.07 on 3/18/22   - Hold off on antibiotics for now      3. Tobacco Dependent  Smoking cessation encouraged      4. COPD, wheezing:   - doubt exacerbation, but suspect suboptimally treated given present wheezing   - continue symbicort BID   - duonebs BID and q4 hrs prn      5. Pancreatitis:   - CT 3/18/22 with ?  Pancreatitis  - patient denies abdominal pain (again 3/19/22) and back pain   - lipase 3/18/22 elevated to 713   - check TG levels with AM labs   - IVF support - LR @ 125 ml /hr   - hx of consuming whiskey (cinnamon) - fireball - reports drinking a large bottle of fireball in the past week   - diet ok as tolerated     DVT Prophylaxis: on heparin drip   Diet: Diet NPO Exceptions are: Sips of Water with Meds  Code Status: Full Code    PT/OT Eval Status: not consulted     Dispo - pending consult, evaluation, improvement     Daniel Duvall MD

## 2022-03-20 LAB
A/G RATIO: 1.6 (ref 1.1–2.2)
ALBUMIN SERPL-MCNC: 3.6 G/DL (ref 3.4–5)
ALP BLD-CCNC: 64 U/L (ref 40–129)
ALT SERPL-CCNC: 25 U/L (ref 10–40)
ANION GAP SERPL CALCULATED.3IONS-SCNC: 10 MMOL/L (ref 3–16)
APTT: 109.8 SEC (ref 26.2–38.6)
APTT: 52.9 SEC (ref 26.2–38.6)
APTT: 57 SEC (ref 26.2–38.6)
AST SERPL-CCNC: 22 U/L (ref 15–37)
BASOPHILS ABSOLUTE: 0 K/UL (ref 0–0.2)
BASOPHILS RELATIVE PERCENT: 0.3 %
BILIRUB SERPL-MCNC: 0.6 MG/DL (ref 0–1)
BUN BLDV-MCNC: 8 MG/DL (ref 7–20)
CALCIUM SERPL-MCNC: 9.1 MG/DL (ref 8.3–10.6)
CHLORIDE BLD-SCNC: 98 MMOL/L (ref 99–110)
CO2: 27 MMOL/L (ref 21–32)
CREAT SERPL-MCNC: 0.9 MG/DL (ref 0.9–1.3)
EOSINOPHILS ABSOLUTE: 0.2 K/UL (ref 0–0.6)
EOSINOPHILS RELATIVE PERCENT: 1.2 %
ESTIMATED AVERAGE GLUCOSE: 114 MG/DL
GFR AFRICAN AMERICAN: >60
GFR NON-AFRICAN AMERICAN: >60
GLUCOSE BLD-MCNC: 116 MG/DL (ref 70–99)
HBA1C MFR BLD: 5.6 %
HCT VFR BLD CALC: 42.8 % (ref 40.5–52.5)
HEMOGLOBIN: 14.3 G/DL (ref 13.5–17.5)
LIPASE: 55 U/L (ref 13–60)
LYMPHOCYTES ABSOLUTE: 2.3 K/UL (ref 1–5.1)
LYMPHOCYTES RELATIVE PERCENT: 15.8 %
MCH RBC QN AUTO: 32.6 PG (ref 26–34)
MCHC RBC AUTO-ENTMCNC: 33.3 G/DL (ref 31–36)
MCV RBC AUTO: 97.8 FL (ref 80–100)
MONOCYTES ABSOLUTE: 1.3 K/UL (ref 0–1.3)
MONOCYTES RELATIVE PERCENT: 9 %
NEUTROPHILS ABSOLUTE: 10.6 K/UL (ref 1.7–7.7)
NEUTROPHILS RELATIVE PERCENT: 73.7 %
PDW BLD-RTO: 14.5 % (ref 12.4–15.4)
PLATELET # BLD: 208 K/UL (ref 135–450)
PMV BLD AUTO: 8.5 FL (ref 5–10.5)
POTASSIUM REFLEX MAGNESIUM: 3.9 MMOL/L (ref 3.5–5.1)
RBC # BLD: 4.38 M/UL (ref 4.2–5.9)
SARS-COV-2: NOT DETECTED
SODIUM BLD-SCNC: 135 MMOL/L (ref 136–145)
TOTAL PROTEIN: 5.8 G/DL (ref 6.4–8.2)
TRIGL SERPL-MCNC: 130 MG/DL (ref 0–150)
WBC # BLD: 14.4 K/UL (ref 4–11)

## 2022-03-20 PROCEDURE — 83036 HEMOGLOBIN GLYCOSYLATED A1C: CPT

## 2022-03-20 PROCEDURE — 2580000003 HC RX 258: Performed by: NURSE PRACTITIONER

## 2022-03-20 PROCEDURE — 2580000003 HC RX 258: Performed by: PEDIATRICS

## 2022-03-20 PROCEDURE — 84478 ASSAY OF TRIGLYCERIDES: CPT

## 2022-03-20 PROCEDURE — 6370000000 HC RX 637 (ALT 250 FOR IP): Performed by: NURSE PRACTITIONER

## 2022-03-20 PROCEDURE — 87040 BLOOD CULTURE FOR BACTERIA: CPT

## 2022-03-20 PROCEDURE — 80053 COMPREHEN METABOLIC PANEL: CPT

## 2022-03-20 PROCEDURE — 83690 ASSAY OF LIPASE: CPT

## 2022-03-20 PROCEDURE — 6360000002 HC RX W HCPCS: Performed by: NURSE PRACTITIONER

## 2022-03-20 PROCEDURE — 94640 AIRWAY INHALATION TREATMENT: CPT

## 2022-03-20 PROCEDURE — 94761 N-INVAS EAR/PLS OXIMETRY MLT: CPT

## 2022-03-20 PROCEDURE — 99233 SBSQ HOSP IP/OBS HIGH 50: CPT | Performed by: SURGERY

## 2022-03-20 PROCEDURE — 85025 COMPLETE CBC W/AUTO DIFF WBC: CPT

## 2022-03-20 PROCEDURE — 6370000000 HC RX 637 (ALT 250 FOR IP): Performed by: INTERNAL MEDICINE

## 2022-03-20 PROCEDURE — 1200000000 HC SEMI PRIVATE

## 2022-03-20 PROCEDURE — 85730 THROMBOPLASTIN TIME PARTIAL: CPT

## 2022-03-20 PROCEDURE — 36415 COLL VENOUS BLD VENIPUNCTURE: CPT

## 2022-03-20 RX ADMIN — Medication 15 UNITS/KG/HR: at 19:36

## 2022-03-20 RX ADMIN — Medication 2 PUFF: at 20:34

## 2022-03-20 RX ADMIN — SODIUM CHLORIDE, PRESERVATIVE FREE 10 ML: 5 INJECTION INTRAVENOUS at 19:40

## 2022-03-20 RX ADMIN — Medication 13 UNITS/KG/HR: at 13:03

## 2022-03-20 RX ADMIN — SODIUM CHLORIDE, POTASSIUM CHLORIDE, SODIUM LACTATE AND CALCIUM CHLORIDE: 600; 310; 30; 20 INJECTION, SOLUTION INTRAVENOUS at 19:45

## 2022-03-20 RX ADMIN — IPRATROPIUM BROMIDE AND ALBUTEROL SULFATE 1 AMPULE: .5; 3 SOLUTION RESPIRATORY (INHALATION) at 20:34

## 2022-03-20 RX ADMIN — Medication 2 PUFF: at 10:21

## 2022-03-20 RX ADMIN — IPRATROPIUM BROMIDE AND ALBUTEROL SULFATE 1 AMPULE: .5; 3 SOLUTION RESPIRATORY (INHALATION) at 10:21

## 2022-03-20 RX ADMIN — HYDROCODONE BITARTRATE AND ACETAMINOPHEN 1 TABLET: 5; 325 TABLET ORAL at 09:31

## 2022-03-20 RX ADMIN — HEPARIN SODIUM 3580 UNITS: 1000 INJECTION INTRAVENOUS; SUBCUTANEOUS at 19:38

## 2022-03-20 RX ADMIN — HYDROCODONE BITARTRATE AND ACETAMINOPHEN 1 TABLET: 5; 325 TABLET ORAL at 21:23

## 2022-03-20 RX ADMIN — SODIUM CHLORIDE, POTASSIUM CHLORIDE, SODIUM LACTATE AND CALCIUM CHLORIDE: 600; 310; 30; 20 INJECTION, SOLUTION INTRAVENOUS at 03:47

## 2022-03-20 RX ADMIN — HEPARIN SODIUM 3580 UNITS: 1000 INJECTION INTRAVENOUS; SUBCUTANEOUS at 05:06

## 2022-03-20 RX ADMIN — SODIUM CHLORIDE, POTASSIUM CHLORIDE, SODIUM LACTATE AND CALCIUM CHLORIDE: 600; 310; 30; 20 INJECTION, SOLUTION INTRAVENOUS at 12:03

## 2022-03-20 RX ADMIN — HYDROCODONE BITARTRATE AND ACETAMINOPHEN 1 TABLET: 5; 325 TABLET ORAL at 03:52

## 2022-03-20 RX ADMIN — ATORVASTATIN CALCIUM 40 MG: 40 TABLET, FILM COATED ORAL at 19:46

## 2022-03-20 RX ADMIN — Medication 15 UNITS/KG/HR: at 19:53

## 2022-03-20 ASSESSMENT — PAIN DESCRIPTION - ORIENTATION: ORIENTATION: LEFT

## 2022-03-20 ASSESSMENT — PAIN DESCRIPTION - PROGRESSION: CLINICAL_PROGRESSION: NOT CHANGED

## 2022-03-20 ASSESSMENT — PAIN DESCRIPTION - PAIN TYPE: TYPE: ACUTE PAIN

## 2022-03-20 ASSESSMENT — PAIN SCALES - GENERAL
PAINLEVEL_OUTOF10: 7
PAINLEVEL_OUTOF10: 0
PAINLEVEL_OUTOF10: 5
PAINLEVEL_OUTOF10: 2
PAINLEVEL_OUTOF10: 6

## 2022-03-20 ASSESSMENT — PAIN DESCRIPTION - DESCRIPTORS: DESCRIPTORS: SHARP;SHOOTING;SORE

## 2022-03-20 ASSESSMENT — PAIN DESCRIPTION - FREQUENCY: FREQUENCY: CONTINUOUS

## 2022-03-20 ASSESSMENT — PAIN DESCRIPTION - LOCATION: LOCATION: FOOT;ANKLE

## 2022-03-20 ASSESSMENT — PAIN - FUNCTIONAL ASSESSMENT: PAIN_FUNCTIONAL_ASSESSMENT: PREVENTS OR INTERFERES SOME ACTIVE ACTIVITIES AND ADLS

## 2022-03-20 ASSESSMENT — PAIN DESCRIPTION - ONSET: ONSET: ON-GOING

## 2022-03-20 NOTE — PROGRESS NOTES
PM assessment completed. Pt resting well in bed with no c/o pain or discomfort. No needs voiced. Fall precautions in place, hourly rounding, call light and belongings in reach, bed in lowest position, wheels locked in place, side rails up x 2, walkways free of clutter.

## 2022-03-20 NOTE — PROGRESS NOTES
Vascular Surgery Progress Note      SUBJECTIVE:  Resting comfortably with foot up in the bed. States rest pain improved after starting heparin. No new complaints. OBJECTIVE  Physical  CURRENT VITALS:  /70   Pulse 80   Temp 98.2 °F (36.8 °C) (Oral)   Resp 16   Ht 5' 7\" (1.702 m)   Wt 197 lb 3.2 oz (89.4 kg)   SpO2 92%   BMI 30.89 kg/m²     Foot is stable, slightly cool but normal motor/sensory. No cyanosis or mottling. Faint PT signal only. Data  COVID 19 negative  WBC decreased to 14.      Current Medications  Current Facility-Administered Medications: heparin (porcine) injection 7,150 Units, 80 Units/kg, IntraVENous, PRN  heparin (porcine) injection 3,580 Units, 40 Units/kg, IntraVENous, PRN  heparin 25,000 units in dextrose 5% 250 mL (premix) infusion, 5-30 Units/kg/hr, IntraVENous, Continuous  albuterol sulfate  (90 Base) MCG/ACT inhaler 2 puff, 2 puff, Inhalation, Q4H PRN  atorvastatin (LIPITOR) tablet 40 mg, 40 mg, Oral, Nightly  budesonide-formoterol (SYMBICORT) 160-4.5 MCG/ACT inhaler 2 puff, 2 puff, Inhalation, BID  sodium chloride flush 0.9 % injection 5-40 mL, 5-40 mL, IntraVENous, 2 times per day  sodium chloride flush 0.9 % injection 5-40 mL, 5-40 mL, IntraVENous, PRN  0.9 % sodium chloride infusion, 25 mL, IntraVENous, PRN  ondansetron (ZOFRAN-ODT) disintegrating tablet 4 mg, 4 mg, Oral, Q8H PRN **OR** ondansetron (ZOFRAN) injection 4 mg, 4 mg, IntraVENous, Q6H PRN  polyethylene glycol (GLYCOLAX) packet 17 g, 17 g, Oral, Daily PRN  acetaminophen (TYLENOL) tablet 650 mg, 650 mg, Oral, Q6H PRN **OR** acetaminophen (TYLENOL) suppository 650 mg, 650 mg, Rectal, Q6H PRN  HYDROcodone-acetaminophen (NORCO) 5-325 MG per tablet 1 tablet, 1 tablet, Oral, Q6H PRN  perflutren lipid microspheres (DEFINITY) injection 1.65 mg, 1.5 mL, IntraVENous, ONCE PRN  morphine (PF) injection 2 mg, 2 mg, IntraVENous, Q3H PRN  ipratropium-albuterol (DUONEB) nebulizer solution 1 ampule, 1 ampule, Inhalation, BID  lactated ringers infusion, , IntraVENous, Continuous  ipratropium-albuterol (DUONEB) nebulizer solution 1 ampule, 1 ampule, Inhalation, Q4H PRN    ASSESSMENT :  Left leg critical limb ischemia  Tobacco abuse    PLAN:  NPO after midnight for left leg arteriogram, initiation of lysis.

## 2022-03-20 NOTE — PROGRESS NOTES
[facility]    3/20/2022 7:46 AM        Name: Ten Quiros . Admitted: 3/18/2022  Primary Care Provider: ORLANDO Crews CNP (Tel: [phone])      Subjective:  . Admitted with left lower leg ischemia and pain   On heparin drip  Smoker of 25 years of cigarettes, now smokes 2 cigars per day     Awaiting LLE arteriogram/thrombolysis on Monday/ Tuesday per vascular   Norco is controlling his pain    He denies any nausea , vomiting or abd pain. 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 He had reconstitution of the peroneal and PT, which was patent to the ankle. Anterior tibial artery appeared completely occluded just past the origin.     Reviewed interval ancillary notes    Current Medications  heparin (porcine) injection 7,150 Units, PRN  heparin (porcine) injection 3,580 Units, PRN  heparin 25,000 units in dextrose 5% 250 mL (premix) infusion, Continuous  albuterol sulfate  (90 Base) MCG/ACT inhaler 2 puff, Q4H PRN  atorvastatin (LIPITOR) tablet 40 mg, Nightly  budesonide-formoterol (SYMBICORT) 160-4.5 MCG/ACT inhaler 2 puff, BID  sodium chloride flush 0.9 % injection 5-40 mL, 2 times per day  sodium chloride flush 0.9 % injection 5-40 mL, PRN  0.9 % sodium chloride infusion, PRN  ondansetron (ZOFRAN-ODT) disintegrating tablet 4 mg, Q8H PRN   Or  ondansetron (ZOFRAN) injection 4 mg, Q6H PRN  polyethylene glycol (GLYCOLAX) packet 17 g, Daily PRN  acetaminophen (TYLENOL) tablet 650 mg, Q6H PRN   Or  acetaminophen (TYLENOL) suppository 650 mg, Q6H PRN  HYDROcodone-acetaminophen (NORCO) 5-325 MG per tablet 1 tablet, Q6H PRN  perflutren lipid microspheres (DEFINITY) injection 1.65 mg, ONCE PRN  morphine (PF) injection 2 mg, Q3H PRN  ipratropium-albuterol (DUONEB) nebulizer solution 1 ampule, BID  lactated ringers infusion, Continuous  ipratropium-albuterol (DUONEB) nebulizer solution 1 ampule, Q4H PRN        Objective:  /80   Pulse 87   Temp 99.1 °F (37.3 °C) (Oral)   Resp 18   Ht 5' 7\" (1.702 m)   Wt 197 lb 3.2 oz (89.4 kg)   SpO2 90%   BMI 30.89 kg/m²     Intake/Output Summary (Last 24 hours) at 3/20/2022 0746  Last data filed at 3/20/2022 0351  Gross per 24 hour   Intake 4066.66 ml   Output 1150 ml   Net 2916.66 ml      Wt Readings from Last 3 Encounters:   03/19/22 197 lb 3.2 oz (89.4 kg)   03/18/22 191 lb (86.6 kg)   03/18/22 191 lb (86.6 kg)       General appearance:  Appears comfortable, looks chronically ill   Eyes: Sclera clear. Pupils equal.  ENT: Moist oral mucosa. Trachea midline, no adenopathy. Cardiovascular: Regular rhythm, normal S1, S2. No murmur. No edema in lower extremities  Respiratory: Not using accessory muscles. Good inspiratory effort. Clear to auscultation bilaterally, no wheeze or crackles. GI: Abdomen soft with active bowel sounds no tenderness, not distended, no guarding   Musculoskeletal: No cyanosis in digits, neck supple  Neurology: CN 2-12 grossly intact. No speech or motor deficits  Psych: Normal affect. Alert and oriented in time, place and person  Skin: Warm, dry, feet are pale and warm to the touch.  + pain over the left foot with light touch. PT doppler is present over the left foot.   Unable to obtain DP doppler  ,  Right foot with very strong doppler signals and no pain     Labs and Tests:  CBC:   Recent Labs     03/18/22 1945 03/20/22 0218   WBC 22.1* 14.4*   HGB 15.8 14.3    208     BMP:    Recent Labs     03/18/22 1945 03/20/22 0218    135*   K 4.0 3.9   CL 99 98*   CO2 25 27   BUN 17 8   CREATININE 0.9 0.9   GLUCOSE 115* 116*     Hepatic:   Recent Labs     03/18/22 1945 03/20/22  0218   AST 29 22   ALT 35 25   BILITOT 0.3 0.6   ALKPHOS 68 64     COVID Negative CTA  Abd Aorta   Near complete occlusion of the left popliteal artery.  Flow to the left   foot is demonstrated via the posterior tibial artery.       2.  Occlusion of the left common iliac artery with reconstitution at the   level of the external iliac artery by the inferior epigastric.       3.  Findings of mild focal pancreatitis in the tail is demonstrated.       4.  Diverticulosis. Problem List  Principal Problem:    Ischemia of left lower extremity  Resolved Problems:    * No resolved hospital problems. *       Assessment & Plan:   1. Acute LLE arterial occlusion: On heparin drip, awaiting LLE arteriogram/ thrombolysis on Monday or Tuesday. Appreciate input from vascular . He is on ASA and statin therapy  2. Leukocytosis:  Trending down , COVID is negative   3. Doubt any pancreatitis:  Lipase is normal and he has no belly pain. He is on IV hydration   4. Taking norco for foot/ ischemic pain with good results   5. NPO after MN for possible procedure      Diet: ADULT DIET;  Regular  Diet NPO Exceptions are: Sips of Water with Meds  Code:Full Code  DVT PPX      ORLANDO Watkins CNP   3/20/2022 7:46 AM

## 2022-03-20 NOTE — PLAN OF CARE
Problem: Falls - Risk of:  Goal: Will remain free from falls  Description: Will remain free from falls  3/20/2022 5011 by Charla Ariza RN  Outcome: Ongoing  3/19/2022 2034 by Collette Gains, RN  Outcome: Ongoing  Goal: Absence of physical injury  Description: Absence of physical injury  3/20/2022 5118 by Charla Ariza RN  Outcome: Ongoing  3/19/2022 2034 by Collette Gains, RN  Outcome: Ongoing     Problem: Pain:  Goal: Pain level will decrease  Description: Pain level will decrease  3/20/2022 0927 by Charla Ariza RN  Outcome: Ongoing  3/19/2022 2034 by Collette Gains, RN  Outcome: Ongoing  Goal: Control of acute pain  Description: Control of acute pain  3/20/2022 0927 by Charla Ariza RN  Outcome: Ongoing  3/19/2022 2034 by Collette Gains, RN  Outcome: Ongoing  Goal: Control of chronic pain  Description: Control of chronic pain  3/20/2022 0927 by Charla Ariza RN  Outcome: Ongoing  3/19/2022 2034 by Collette Gains, RN  Outcome: Ongoing     Problem: Physical Regulation:  Goal: Will remain free from infection  Description: Will remain free from infection  Outcome: Ongoing  Goal: Ability to maintain vital signs within normal range will improve  Description: Ability to maintain vital signs within normal range will improve  Outcome: Ongoing     Problem: Respiratory:  Goal: Ability to maintain normal respiratory secretions will improve  Description: Ability to maintain normal respiratory secretions will improve  Outcome: Ongoing     Problem: Skin Integrity:  Goal: Demonstration of wound healing without infection will improve  Description: Demonstration of wound healing without infection will improve  Outcome: Ongoing  Goal: Complications related to intravenous access or infusion will be avoided or minimized  Description: Complications related to intravenous access or infusion will be avoided or minimized  Outcome: Ongoing

## 2022-03-20 NOTE — PROGRESS NOTES
Shift assessment completed. Patient is alert and oriented in bed. Reports pain 6/10 to L foot and ankle. Doppler used to obtain pedal pulse; pulse was moderate. PRN Norco given for pain. The care plan and education reviewed and mutually agreed upon with the patient. Standard safety measures in place. Will continue to monitor the patient.

## 2022-03-20 NOTE — PROGRESS NOTES
3371: APTT drawn by phlebotomist at [phone], not yet resulted. Call placed to lab. Lab states they are having issues with equipment/machine. 0505: APTT result finally in, 57.0. Will adjust dose per MAR. Next draw scheduled for 1100.

## 2022-03-20 NOTE — CARE COORDINATION
Discharge Planning:  I have reviewed the patient's medical history in detail and updated the computerized patient record. Patient independent prior to admission. There are no needs identified at this time. If something specific is identified, please notify Discharge Planner. Readmission Risk Score: 7%    CM will continue to follow.      Electronically signed by Aleksandra Levy RN on 3/20/2022 at 11:40 AM

## 2022-03-21 LAB
ANION GAP SERPL CALCULATED.3IONS-SCNC: 9 MMOL/L (ref 3–16)
APTT: 45.1 SEC (ref 26.2–38.6)
APTT: 49 SEC (ref 26.2–38.6)
APTT: 65.9 SEC (ref 26.2–38.6)
APTT: 69.1 SEC (ref 26.2–38.6)
BUN BLDV-MCNC: 8 MG/DL (ref 7–20)
CALCIUM SERPL-MCNC: 9 MG/DL (ref 8.3–10.6)
CHLORIDE BLD-SCNC: 101 MMOL/L (ref 99–110)
CO2: 27 MMOL/L (ref 21–32)
CREAT SERPL-MCNC: 0.9 MG/DL (ref 0.9–1.3)
FIBRINOGEN: 227 MG/DL (ref 200–397)
FIBRINOGEN: 618 MG/DL (ref 200–397)
FIBRINOGEN: 721 MG/DL (ref 200–397)
GFR AFRICAN AMERICAN: >60
GFR NON-AFRICAN AMERICAN: >60
GLUCOSE BLD-MCNC: 101 MG/DL (ref 70–99)
HCT VFR BLD CALC: 39.8 % (ref 40.5–52.5)
HCT VFR BLD CALC: 40.9 % (ref 40.5–52.5)
HCT VFR BLD CALC: 41.1 % (ref 40.5–52.5)
HEMOGLOBIN: 13.5 G/DL (ref 13.5–17.5)
HEMOGLOBIN: 13.6 G/DL (ref 13.5–17.5)
HEMOGLOBIN: 13.6 G/DL (ref 13.5–17.5)
LV EF: 63 %
LVEF MODALITY: NORMAL
MCH RBC QN AUTO: 32.2 PG (ref 26–34)
MCH RBC QN AUTO: 32.4 PG (ref 26–34)
MCH RBC QN AUTO: 33.5 PG (ref 26–34)
MCHC RBC AUTO-ENTMCNC: 33 G/DL (ref 31–36)
MCHC RBC AUTO-ENTMCNC: 33.2 G/DL (ref 31–36)
MCHC RBC AUTO-ENTMCNC: 34.1 G/DL (ref 31–36)
MCV RBC AUTO: 97.5 FL (ref 80–100)
MCV RBC AUTO: 97.6 FL (ref 80–100)
MCV RBC AUTO: 98.4 FL (ref 80–100)
PDW BLD-RTO: 14.2 % (ref 12.4–15.4)
PLATELET # BLD: 176 K/UL (ref 135–450)
PLATELET # BLD: 186 K/UL (ref 135–450)
PLATELET # BLD: 199 K/UL (ref 135–450)
PMV BLD AUTO: 7.5 FL (ref 5–10.5)
PMV BLD AUTO: 7.7 FL (ref 5–10.5)
PMV BLD AUTO: 7.9 FL (ref 5–10.5)
POTASSIUM SERPL-SCNC: 4.7 MMOL/L (ref 3.5–5.1)
RBC # BLD: 4.04 M/UL (ref 4.2–5.9)
RBC # BLD: 4.19 M/UL (ref 4.2–5.9)
RBC # BLD: 4.21 M/UL (ref 4.2–5.9)
SODIUM BLD-SCNC: 137 MMOL/L (ref 136–145)
WBC # BLD: 10.3 K/UL (ref 4–11)
WBC # BLD: 12 K/UL (ref 4–11)
WBC # BLD: 13.4 K/UL (ref 4–11)

## 2022-03-21 PROCEDURE — 94640 AIRWAY INHALATION TREATMENT: CPT

## 2022-03-21 PROCEDURE — 76937 US GUIDE VASCULAR ACCESS: CPT | Performed by: SURGERY

## 2022-03-21 PROCEDURE — 85730 THROMBOPLASTIN TIME PARTIAL: CPT

## 2022-03-21 PROCEDURE — APPNB30 APP NON BILLABLE TIME 0-30 MINS: Performed by: NURSE PRACTITIONER

## 2022-03-21 PROCEDURE — 36247 INS CATH ABD/L-EXT ART 3RD: CPT

## 2022-03-21 PROCEDURE — 6370000000 HC RX 637 (ALT 250 FOR IP): Performed by: INTERNAL MEDICINE

## 2022-03-21 PROCEDURE — 75625 CONTRAST EXAM ABDOMINL AORTA: CPT | Performed by: SURGERY

## 2022-03-21 PROCEDURE — 80048 BASIC METABOLIC PNL TOTAL CA: CPT

## 2022-03-21 PROCEDURE — 85384 FIBRINOGEN ACTIVITY: CPT

## 2022-03-21 PROCEDURE — 36415 COLL VENOUS BLD VENIPUNCTURE: CPT

## 2022-03-21 PROCEDURE — 75710 ARTERY X-RAYS ARM/LEG: CPT | Performed by: SURGERY

## 2022-03-21 PROCEDURE — 94761 N-INVAS EAR/PLS OXIMETRY MLT: CPT

## 2022-03-21 PROCEDURE — 93306 TTE W/DOPPLER COMPLETE: CPT

## 2022-03-21 PROCEDURE — 37211 THROMBOLYTIC ART THERAPY: CPT

## 2022-03-21 PROCEDURE — 75774 ARTERY X-RAY EACH VESSEL: CPT

## 2022-03-21 PROCEDURE — 6360000002 HC RX W HCPCS: Performed by: SURGERY

## 2022-03-21 PROCEDURE — 6370000000 HC RX 637 (ALT 250 FOR IP): Performed by: SURGERY

## 2022-03-21 PROCEDURE — B41G1ZZ FLUOROSCOPY OF LEFT LOWER EXTREMITY ARTERIES USING LOW OSMOLAR CONTRAST: ICD-10-PCS | Performed by: SURGERY

## 2022-03-21 PROCEDURE — 99152 MOD SED SAME PHYS/QHP 5/>YRS: CPT

## 2022-03-21 PROCEDURE — APPSS30 APP SPLIT SHARED TIME 16-30 MINUTES: Performed by: NURSE PRACTITIONER

## 2022-03-21 PROCEDURE — 6370000000 HC RX 637 (ALT 250 FOR IP): Performed by: NURSE PRACTITIONER

## 2022-03-21 PROCEDURE — 2580000003 HC RX 258: Performed by: PEDIATRICS

## 2022-03-21 PROCEDURE — 2580000003 HC RX 258: Performed by: SURGERY

## 2022-03-21 PROCEDURE — C1751 CATH, INF, PER/CENT/MIDLINE: HCPCS

## 2022-03-21 PROCEDURE — 75625 CONTRAST EXAM ABDOMINL AORTA: CPT

## 2022-03-21 PROCEDURE — 2580000003 HC RX 258

## 2022-03-21 PROCEDURE — 3E05317 INTRODUCTION OF OTHER THROMBOLYTIC INTO PERIPHERAL ARTERY, PERCUTANEOUS APPROACH: ICD-10-PCS | Performed by: SURGERY

## 2022-03-21 PROCEDURE — 36247 INS CATH ABD/L-EXT ART 3RD: CPT | Performed by: SURGERY

## 2022-03-21 PROCEDURE — 6360000002 HC RX W HCPCS

## 2022-03-21 PROCEDURE — C1769 GUIDE WIRE: HCPCS

## 2022-03-21 PROCEDURE — 6360000004 HC RX CONTRAST MEDICATION: Performed by: SURGERY

## 2022-03-21 PROCEDURE — 99152 MOD SED SAME PHYS/QHP 5/>YRS: CPT | Performed by: SURGERY

## 2022-03-21 PROCEDURE — 75710 ARTERY X-RAYS ARM/LEG: CPT

## 2022-03-21 PROCEDURE — 2500000003 HC RX 250 WO HCPCS

## 2022-03-21 PROCEDURE — C1887 CATHETER, GUIDING: HCPCS

## 2022-03-21 PROCEDURE — 2100000000 HC CCU R&B

## 2022-03-21 PROCEDURE — B4101ZZ FLUOROSCOPY OF ABDOMINAL AORTA USING LOW OSMOLAR CONTRAST: ICD-10-PCS | Performed by: SURGERY

## 2022-03-21 PROCEDURE — 85027 COMPLETE CBC AUTOMATED: CPT

## 2022-03-21 PROCEDURE — 99153 MOD SED SAME PHYS/QHP EA: CPT

## 2022-03-21 PROCEDURE — 37211 THROMBOLYTIC ART THERAPY: CPT | Performed by: SURGERY

## 2022-03-21 PROCEDURE — C1894 INTRO/SHEATH, NON-LASER: HCPCS

## 2022-03-21 RX ORDER — SODIUM CHLORIDE 0.9 % (FLUSH) 0.9 %
5-40 SYRINGE (ML) INJECTION EVERY 12 HOURS SCHEDULED
Status: DISCONTINUED | OUTPATIENT
Start: 2022-03-21 | End: 2022-03-24 | Stop reason: HOSPADM

## 2022-03-21 RX ORDER — SODIUM CHLORIDE 9 MG/ML
INJECTION, SOLUTION INTRAVENOUS CONTINUOUS PRN
Status: ACTIVE | OUTPATIENT
Start: 2022-03-21 | End: 2022-03-22

## 2022-03-21 RX ORDER — SODIUM CHLORIDE 9 MG/ML
INJECTION, SOLUTION INTRAVENOUS CONTINUOUS
Status: DISCONTINUED | OUTPATIENT
Start: 2022-03-21 | End: 2022-03-22

## 2022-03-21 RX ORDER — HEPARIN SODIUM 10000 [USP'U]/100ML
500 INJECTION, SOLUTION INTRAVENOUS CONTINUOUS
Status: DISCONTINUED | OUTPATIENT
Start: 2022-03-21 | End: 2022-03-23

## 2022-03-21 RX ORDER — SODIUM CHLORIDE 0.9 % (FLUSH) 0.9 %
5-40 SYRINGE (ML) INJECTION PRN
Status: DISCONTINUED | OUTPATIENT
Start: 2022-03-21 | End: 2022-03-24 | Stop reason: HOSPADM

## 2022-03-21 RX ORDER — ONDANSETRON 2 MG/ML
4 INJECTION INTRAMUSCULAR; INTRAVENOUS EVERY 6 HOURS PRN
Status: DISCONTINUED | OUTPATIENT
Start: 2022-03-21 | End: 2022-03-24 | Stop reason: HOSPADM

## 2022-03-21 RX ORDER — ONDANSETRON 4 MG/1
4 TABLET, ORALLY DISINTEGRATING ORAL EVERY 8 HOURS PRN
Status: DISCONTINUED | OUTPATIENT
Start: 2022-03-21 | End: 2022-03-24 | Stop reason: HOSPADM

## 2022-03-21 RX ORDER — MORPHINE SULFATE 2 MG/ML
2 INJECTION, SOLUTION INTRAMUSCULAR; INTRAVENOUS
Status: DISCONTINUED | OUTPATIENT
Start: 2022-03-21 | End: 2022-03-24 | Stop reason: HOSPADM

## 2022-03-21 RX ORDER — SODIUM CHLORIDE 9 MG/ML
25 INJECTION, SOLUTION INTRAVENOUS PRN
Status: DISCONTINUED | OUTPATIENT
Start: 2022-03-21 | End: 2022-03-24 | Stop reason: HOSPADM

## 2022-03-21 RX ORDER — IODIXANOL 320 MG/ML
30 INJECTION, SOLUTION INTRAVASCULAR
Status: COMPLETED | OUTPATIENT
Start: 2022-03-21 | End: 2022-03-21

## 2022-03-21 RX ADMIN — IODIXANOL 30 ML: 320 INJECTION, SOLUTION INTRAVASCULAR at 16:49

## 2022-03-21 RX ADMIN — SODIUM CHLORIDE, POTASSIUM CHLORIDE, SODIUM LACTATE AND CALCIUM CHLORIDE: 600; 310; 30; 20 INJECTION, SOLUTION INTRAVENOUS at 14:08

## 2022-03-21 RX ADMIN — IPRATROPIUM BROMIDE AND ALBUTEROL SULFATE 1 AMPULE: .5; 3 SOLUTION RESPIRATORY (INHALATION) at 19:42

## 2022-03-21 RX ADMIN — HYDROCODONE BITARTRATE AND ACETAMINOPHEN 1 TABLET: 5; 325 TABLET ORAL at 19:16

## 2022-03-21 RX ADMIN — IPRATROPIUM BROMIDE AND ALBUTEROL SULFATE 1 AMPULE: .5; 3 SOLUTION RESPIRATORY (INHALATION) at 09:05

## 2022-03-21 RX ADMIN — MORPHINE SULFATE 2 MG: 2 INJECTION, SOLUTION INTRAMUSCULAR; INTRAVENOUS at 21:08

## 2022-03-21 RX ADMIN — SODIUM CHLORIDE, PRESERVATIVE FREE 10 ML: 5 INJECTION INTRAVENOUS at 21:08

## 2022-03-21 RX ADMIN — HYDROCODONE BITARTRATE AND ACETAMINOPHEN 1 TABLET: 5; 325 TABLET ORAL at 11:06

## 2022-03-21 RX ADMIN — Medication 2 PUFF: at 09:05

## 2022-03-21 RX ADMIN — MORPHINE SULFATE 2 MG: 2 INJECTION, SOLUTION INTRAMUSCULAR; INTRAVENOUS at 18:01

## 2022-03-21 RX ADMIN — HYDROCODONE BITARTRATE AND ACETAMINOPHEN 1 TABLET: 5; 325 TABLET ORAL at 03:53

## 2022-03-21 RX ADMIN — SODIUM CHLORIDE, POTASSIUM CHLORIDE, SODIUM LACTATE AND CALCIUM CHLORIDE: 600; 310; 30; 20 INJECTION, SOLUTION INTRAVENOUS at 03:54

## 2022-03-21 RX ADMIN — Medication 2 PUFF: at 19:43

## 2022-03-21 ASSESSMENT — PAIN DESCRIPTION - ORIENTATION
ORIENTATION: LEFT
ORIENTATION: LEFT

## 2022-03-21 ASSESSMENT — PAIN SCALES - GENERAL
PAINLEVEL_OUTOF10: 10
PAINLEVEL_OUTOF10: 6
PAINLEVEL_OUTOF10: 8
PAINLEVEL_OUTOF10: 8
PAINLEVEL_OUTOF10: 10
PAINLEVEL_OUTOF10: 5
PAINLEVEL_OUTOF10: 0
PAINLEVEL_OUTOF10: 7
PAINLEVEL_OUTOF10: 5

## 2022-03-21 ASSESSMENT — PAIN DESCRIPTION - PAIN TYPE
TYPE: ACUTE PAIN
TYPE: ACUTE PAIN

## 2022-03-21 ASSESSMENT — PAIN DESCRIPTION - LOCATION
LOCATION: ANKLE
LOCATION: ANKLE

## 2022-03-21 NOTE — PROGRESS NOTES
[facility]    3/21/2022 12:55 PM        Name: Cindy Dsouza . Admitted: 3/18/2022  Primary Care Provider: ORLANDO Rust CNP (Tel: [phone])      Subjective:  . Admitted with left lower leg ischemia and pain   On heparin drip  Smoker of 25 years of cigarettes, now smokes 2 cigars per day     Awaiting LLE arteriogram/thrombolysis for later today with Dr Clinton Antony. Shobha Phelan is controlling his pain    He denies any nausea , vomiting or abd pain. 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 He had reconstitution of the peroneal and PT, which was patent to the ankle. Anterior tibial artery appeared completely occluded just past the origin.     Reviewed interval ancillary notes    Current Medications  heparin (porcine) injection 7,150 Units, PRN  heparin (porcine) injection 3,580 Units, PRN  heparin 25,000 units in dextrose 5% 250 mL (premix) infusion, Continuous  albuterol sulfate  (90 Base) MCG/ACT inhaler 2 puff, Q4H PRN  atorvastatin (LIPITOR) tablet 40 mg, Nightly  budesonide-formoterol (SYMBICORT) 160-4.5 MCG/ACT inhaler 2 puff, BID  sodium chloride flush 0.9 % injection 5-40 mL, 2 times per day  sodium chloride flush 0.9 % injection 5-40 mL, PRN  0.9 % sodium chloride infusion, PRN  ondansetron (ZOFRAN-ODT) disintegrating tablet 4 mg, Q8H PRN   Or  ondansetron (ZOFRAN) injection 4 mg, Q6H PRN  polyethylene glycol (GLYCOLAX) packet 17 g, Daily PRN  acetaminophen (TYLENOL) tablet 650 mg, Q6H PRN   Or  acetaminophen (TYLENOL) suppository 650 mg, Q6H PRN  HYDROcodone-acetaminophen (NORCO) 5-325 MG per tablet 1 tablet, Q6H PRN  perflutren lipid microspheres (DEFINITY) injection 1.65 mg, ONCE PRN  morphine (PF) injection 2 mg, Q3H PRN  ipratropium-albuterol (DUONEB) nebulizer solution 1 ampule, BID  lactated ringers infusion, Continuous  ipratropium-albuterol (DUONEB) nebulizer solution 1 ampule, Q4H PRN        Objective:  /79   Pulse 83   Temp 97.8 °F (36.6 °C) (Oral)   Resp 16   Ht 5' 7\" (1.702 m)   Wt 197 lb 3.2 oz (89.4 kg)   SpO2 94%   BMI 30.89 kg/m²     Intake/Output Summary (Last 24 hours) at 3/21/2022 1255  Last data filed at 3/20/2022 1736  Gross per 24 hour   Intake 1862.5 ml   Output --   Net 1862.5 ml      Wt Readings from Last 3 Encounters:   03/19/22 197 lb 3.2 oz (89.4 kg)   03/18/22 191 lb (86.6 kg)   03/18/22 191 lb (86.6 kg)       General appearance:  Appears comfortable, looks chronically ill   Eyes: Sclera clear. Pupils equal.  ENT: Moist oral mucosa. Trachea midline, no adenopathy. Cardiovascular: Regular rhythm, normal S1, S2. No murmur. No edema in lower extremities  Respiratory: Not using accessory muscles. Good inspiratory effort. Clear to auscultation bilaterally, no wheeze or crackles. GI: Abdomen soft with active bowel sounds no tenderness, not distended, no guarding   Musculoskeletal: No cyanosis in digits, neck supple  Neurology: CN 2-12 grossly intact. No speech or motor deficits  Psych: Normal affect. Alert and oriented in time, place and person  Skin: Warm, dry, feet are pale and warm to the touch.  + pain over the left foot with light touch. PT doppler is present over the left foot.   Unable to obtain DP doppler  ,  Right foot with very strong doppler signals and no pain     Labs and Tests:  CBC:   Recent Labs     03/18/22 1945 03/20/22 0218 03/21/22  0514   WBC 22.1* 14.4* 10.3   HGB 15.8 14.3 13.5    208 199     BMP:    Recent Labs     03/18/22 1945 03/20/22  0218    135*   K 4.0 3.9   CL 99 98*   CO2 25 27   BUN 17 8   CREATININE 0.9 0.9   GLUCOSE 115* 116*     Hepatic:   Recent Labs     03/18/22  1945 03/20/22  0218   AST 29 22   ALT 35 25   BILITOT 0.3 0.6   ALKPHOS 68 64

## 2022-03-21 NOTE — PLAN OF CARE
Problem: Falls - Risk of:  Goal: Will remain free from falls  Description: Will remain free from falls  3/20/2022 2155 by Bhargav Rodriguez RN  Outcome: Ongoing     Problem: Falls - Risk of:  Goal: Absence of physical injury  Description: Absence of physical injury  3/20/2022 2155 by Bhargav Rodriguez RN  Outcome: Ongoing     Problem: Pain:  Goal: Pain level will decrease  Description: Pain level will decrease  3/20/2022 2155 by Bhargav Rodriguez RN  Outcome: Ongoing     Problem: Pain:  Goal: Control of acute pain  Description: Control of acute pain  3/20/2022 2155 by Bhargav Rodriguez RN  Outcome: Ongoing     Problem: Pain:  Goal: Control of chronic pain  Description: Control of chronic pain  3/20/2022 2155 by Bhargav Rodriguez RN  Outcome: Ongoing     Problem: Physical Regulation:  Goal: Will remain free from infection  Description: Will remain free from infection  3/20/2022 2155 by Bhargav Rodriguez RN  Outcome: Ongoing     Problem: Physical Regulation:  Goal: Ability to maintain vital signs within normal range will improve  Description: Ability to maintain vital signs within normal range will improve  3/20/2022 2155 by Bhargav Rodriguez RN  Outcome: Ongoing     Problem: Respiratory:  Goal: Ability to maintain normal respiratory secretions will improve  Description: Ability to maintain normal respiratory secretions will improve  3/20/2022 2155 by Bhargav Rodriguez RN  Outcome: Ongoing     Problem: Skin Integrity:  Goal: Demonstration of wound healing without infection will improve  Description: Demonstration of wound healing without infection will improve  3/20/2022 2155 by Bhargav Rodriguez RN  Outcome: Ongoing     Problem: Skin Integrity:  Goal: Complications related to intravenous access or infusion will be avoided or minimized  Description: Complications related to intravenous access or infusion will be avoided or minimized  3/20/2022 2155 by Bhargav Rodriguez RN  Outcome: Ongoing

## 2022-03-21 NOTE — PROGRESS NOTES
SUBJECTIVE:    Patient ID: Marcelo Tristan is a 46 y.o. male. CC: Left ankle pain    HPI: The patient presents to the office today for short follow-up of left ankle pain. Earlier this month, the patient was in his normal state of health when he suffered an ankle injury from his dog. His dog sat on his leg causing his ankle to bend inwards. He had some initial swelling and pain which was relieved with icy hot and heating pad. He has a history of leg psoriasis which has been severe in the past.  The patient was initially treated with a round of steroids which seemed to help. He returned for follow-up reporting significant improvement while taking the steroid pack. He was requesting to try this an additional time given the improvement. Plans were made to obtain an x-ray if he did not improve. On 3/14, the patient underwent left ankle x-ray which showed no acute osseous abnormality. Today, he continues to have left ankle pain. Pain is worse with activity. Pain improves when he is taking steroids, soaking, or using heat. Psoriasis rash remains unchanged. Past Medical History:   Diagnosis Date    Asthma     COPD (chronic obstructive pulmonary disease) ([facility] [address]     Hyperlipidemia         No current facility-administered medications for this visit. No current outpatient medications on file.      Facility-Administered Medications Ordered in Other Visits   Medication Dose Route Frequency Provider Last Rate Last Admin    heparin (porcine) injection 7,150 Units  80 Units/kg IntraVENous PRN Funmi Anna, APRN - CNP        heparin (porcine) injection 3,580 Units  40 Units/kg IntraVENous PRN Funmi Minick, APRN - CNP   3,580 Units at 03/20/22 1938    heparin 25,000 units in dextrose 5% 250 mL (premix) infusion  5-30 Units/kg/hr IntraVENous Continuous Funmi Minick, APRN - CNP 13.4 mL/hr at 03/20/22 1953 15 Units/kg/hr at 03/20/22 1953    albuterol sulfate  (90 Base) MCG/ACT inhaler 2 puff  2 puff Inhalation Q4H PRN Funmi Minick, APRN - CNP        atorvastatin (LIPITOR) tablet 40 mg  40 mg Oral Nightly Funmi Minick, APRN - CNP   40 mg at 03/20/22 1946    budesonide-formoterol (SYMBICORT) 160-4.5 MCG/ACT inhaler 2 puff  2 puff Inhalation BID Britta Starch, APRN - CNP   2 puff at 03/20/22 2034    sodium chloride flush 0.9 % injection 5-40 mL  5-40 mL IntraVENous 2 times per day Funmi Minick, APRN - CNP   10 mL at 03/20/22 1940    sodium chloride flush 0.9 % injection 5-40 mL  5-40 mL IntraVENous PRN Funmi Minick, APRN - CNP        0.9 % sodium chloride infusion  25 mL IntraVENous PRN Funmi Minick, APRN - CNP        ondansetron (ZOFRAN-ODT) disintegrating tablet 4 mg  4 mg Oral Q8H PRN Funmi Minick, APRN - CNP        Or    ondansetron (ZOFRAN) injection 4 mg  4 mg IntraVENous Q6H PRN Funmi Minick, APRN - CNP        polyethylene glycol (GLYCOLAX) packet 17 g  17 g Oral Daily PRN Funmi Minick, APRN - CNP        acetaminophen (TYLENOL) tablet 650 mg  650 mg Oral Q6H PRN Funmi Minick, APRN - CNP        Or    acetaminophen (TYLENOL) suppository 650 mg  650 mg Rectal Q6H PRN Funmi Minick, APRN - CNP        HYDROcodone-acetaminophen (NORCO) 5-325 MG per tablet 1 tablet  1 tablet Oral Q6H PRN Funmi Minick, APRN - CNP   1 tablet at 03/21/22 0353    perflutren lipid microspheres (DEFINITY) injection 1.65 mg  1.5 mL IntraVENous ONCE PRN Funmi Minick, APRN - CNP        morphine (PF) injection 2 mg  2 mg IntraVENous Q3H PRN Funmi Minick, APRN - CNP   2 mg at 03/19/22 0808    ipratropium-albuterol (DUONEB) nebulizer solution 1 ampule  1 ampule Inhalation BID Milo Enrique MD   1 ampule at 03/20/22 2034    lactated ringers infusion   IntraVENous Continuous Daniel Duvall  mL/hr at 03/21/22 0354 New Bag at 03/21/22 0354    ipratropium-albuterol (DUONEB) nebulizer solution 1 ampule  1 ampule Inhalation Q4H PRN Daniel Duvall MD               Review of Systems   Musculoskeletal: Positive for arthralgias and gait problem. OBJECTIVE:  Physical Exam  Constitutional:       Appearance: Normal appearance. Pulmonary:      Effort: Pulmonary effort is normal. No respiratory distress. Skin:     General: Skin is warm and dry. Neurological:      General: No focal deficit present. Mental Status: He is alert and oriented to person, place, and time. Psychiatric:         Mood and Affect: Mood normal.         Behavior: Behavior normal.        /80   Pulse 102   Wt 191 lb (86.6 kg)   SpO2 95%   BMI 29.47 kg/m²      PHQ Scores 7/8/2021 1/13/2020 5/7/2019   PHQ2 Score 0 0 0   PHQ9 Score 0 0 0     Interpretation of Total Score Depression Severity: 1-4 = Minimal depression, 5-9 = Mild depression, 10-14 = Moderate depression, 15-19 = Moderately severe depression, 20-27 =Severe depression        ASSESSMENT/PLAN:  Serafin Dorantes was seen today for ankle pain. Diagnoses and all orders for this visit:    Injury of left lower extremity, initial encounter  -Left ankle injury which initially improved with steroids  -Swelling at times. Improves with heat  - Pain worse with ambulation, improves with rest  - XR unyielding  - Lack of improvement overall. May need MRI or surgical referral.  We discussed options and I don't want this to wait over the weekend. He can be seen at ortho clinic tonight.   - Advised patient I had already confirmed ortho clinic will be open tonight, no appt needed.     Psoriasis  -Chronic, unchanged  - Improves with steroids      ORLANDO Conde - CNP

## 2022-03-21 NOTE — PROGRESS NOTES
Vascular Progress Note    3/21/2022 9:01 AM    Chief complaint / Reason for visit : left leg ischemia     Subjective:  Patient resting in bed. He reports his left leg pain is stable mostly left foot. Remains on Heparin drip. No other complaints. VSS, afebrile.      Vital Signs: /87   Pulse 84   Temp 98.7 °F (37.1 °C) (Oral)   Resp 16   Ht 5' 7\" (1.702 m)   Wt 197 lb 3.2 oz (89.4 kg)   SpO2 92%   BMI 30.89 kg/m²  O2 Flow Rate (L/min): 0 L/min   I/O:      Intake/Output Summary (Last 24 hours) at 3/21/2022 0901  Last data filed at 3/20/2022 1736  Gross per 24 hour   Intake 1862.5 ml   Output --   Net 1862.5 ml       Physical Exam:   General: no apparent distress, appears stated age  Chest/Lungs: clear to auscultation bilaterally, no accessory muscle use  Cardiac:  regular rate and rhythm, S1, S2 normal, no murmur, click, rub or gallop  Abdomen:  abdomen is soft without significant tenderness, masses, organomegaly or guarding  Vascular: + weak left PT signal, 2+ right DP   Extremities: left foot cooler to touch with some mottling noted, tenderness to touch, motor and sensory intact    Labs:   Lab Results   Component Value Date     03/20/2022    K 3.9 03/20/2022    CL 98 03/20/2022    CO2 27 03/20/2022    BUN 8 03/20/2022    CREATININE 0.9 03/20/2022    GFRAA >60 03/20/2022    LABGLOM >60 03/20/2022    GLUCOSE 116 03/20/2022    CALCIUM 9.1 03/20/2022     Lab Results   Component Value Date    WBC 10.3 03/21/2022    RBC 4.04 03/21/2022    HGB 13.5 03/21/2022    HCT 39.8 03/21/2022    MCV 98.4 03/21/2022    RDW 14.2 03/21/2022     03/21/2022     Lab Results   Component Value Date    INR 1.17 (H) 03/18/2022    PROTIME 13.3 (H) 03/18/2022        Imaging:    CTA bilateral runoff 3/18/22:  Impression   1.  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  Flow to the left   foot is demonstrated via the posterior tibial artery.       2.  Occlusion of the left common iliac artery with reconstitution at the   level of the external iliac artery by the inferior epigastric.       3.  Findings of mild focal pancreatitis in the tail is demonstrated.       4.  Diverticulosis.             Scheduled Meds:    atorvastatin  40 mg Oral Nightly    budesonide-formoterol  2 puff Inhalation BID    sodium chloride flush  5-40 mL IntraVENous 2 times per day    ipratropium-albuterol  1 ampule Inhalation BID     Continuous Infusions:    heparin (PORCINE) Infusion 15 Units/kg/hr (03/20/22 1953)    sodium chloride      lactated ringers 125 mL/hr at 03/21/22 0354         Assessment:   Acute left leg ischemia   HLD on statin  Tobacco use - 1 PPD    Plan:  Continue Heparin drip. Planning for left leg angiogram with possible intervention and possible thrombolysis today with Dr. Clinton Brock NPO since midnight. Continue statin therapy. Smoking cessation. D/w patient and agreeable to above procedure. Patient educated on plan of care and disease process. All questions answered.         Electronically signed by ORLANDO Bill CNP on 3/21/2022 at 9:01 AM

## 2022-03-21 NOTE — PROGRESS NOTES
Shift assessment completed, pt is alert and oriented, VSS, independent in room, call light within reach, denies any needs at this time, pt is aware of NPO status after midnight, see flowsheets and MAR, will monitor pt. The care plan and education has been reviewed and mutually agreed upon with the patient. 0130: APTT 65.9, No change to heparin gtt per protocol. Will monitor pt.    0600: APTT 69.1, No change to heparin gtt per protocol. Will monitor pt.

## 2022-03-21 NOTE — PROGRESS NOTES
1930:Post tib present with Doppler, pedal still absent.   2000: post tib and pedal bothabsent now  2030: post tib present with doppler pedal still absent   [facility]

## 2022-03-21 NOTE — PROGRESS NOTES
Pt received from cath lab. EKOS intact to R groin sheath. R groin soft,no bleeding. Drips verified. Assessment completed. Tele SR. Call light in reach.

## 2022-03-21 NOTE — PROGRESS NOTES
Shift assessment completed. VSS. Patient alert and oriented x 4. Independent in room. Left foot/ankle painful to touch. The care plan and education have been reviewed and mutually agreed upon with the patient. Call light in reach. Will continue to monitor.

## 2022-03-21 NOTE — OP NOTE
Brief Postoperative Note    Jeison Malhotra  YOB: 1970  [id number]    Pre-operative Diagnosis: LLE ischemia    Post-operative Diagnosis: Same    Procedure: 1. Ultrasound guided right CFA access  2. A/o with 3rd order LLE angiogram  3.   Initiation of arterial thrombolysis    Anesthesia: Local    Surgeons/Assistants: shanelle    Estimated Blood Loss: less than 50     Complications: None    Specimens: Was Not Obtained    Findings: left MADIHA, EIA, distal popliteal and tibial thrombus    Electronically signed by Jarod Staples MD on 3/21/2022 at 4:45 PM

## 2022-03-21 NOTE — PLAN OF CARE
Problem: Falls - Risk of:  Goal: Will remain free from falls  Description: Will remain free from falls  Outcome: Ongoing  Goal: Absence of physical injury  Description: Absence of physical injury  Outcome: Ongoing     Problem: Pain:  Goal: Pain level will decrease  Description: Pain level will decrease  Outcome: Ongoing  Goal: Control of acute pain  Description: Control of acute pain  Outcome: Ongoing  Goal: Control of chronic pain  Description: Control of chronic pain  Outcome: Ongoing     Problem: Physical Regulation:  Goal: Will remain free from infection  Description: Will remain free from infection  Outcome: Ongoing  Goal: Ability to maintain vital signs within normal range will improve  Description: Ability to maintain vital signs within normal range will improve  Outcome: Ongoing     Problem: Respiratory:  Goal: Ability to maintain normal respiratory secretions will improve  Description: Ability to maintain normal respiratory secretions will improve  Outcome: Ongoing     Problem: Skin Integrity:  Goal: Demonstration of wound healing without infection will improve  Description: Demonstration of wound healing without infection will improve  Outcome: Ongoing  Goal: Complications related to intravenous access or infusion will be avoided or minimized  Description: Complications related to intravenous access or infusion will be avoided or minimized  Outcome: Ongoing

## 2022-03-22 LAB
ANION GAP SERPL CALCULATED.3IONS-SCNC: 14 MMOL/L (ref 3–16)
ANION GAP SERPL CALCULATED.3IONS-SCNC: 14 MMOL/L (ref 3–16)
APTT: 36.9 SEC (ref 26.2–38.6)
APTT: 38.6 SEC (ref 26.2–38.6)
APTT: 40.2 SEC (ref 26.2–38.6)
BUN BLDV-MCNC: 10 MG/DL (ref 7–20)
BUN BLDV-MCNC: 8 MG/DL (ref 7–20)
CALCIUM SERPL-MCNC: 7.3 MG/DL (ref 8.3–10.6)
CALCIUM SERPL-MCNC: 9.1 MG/DL (ref 8.3–10.6)
CHLORIDE BLD-SCNC: 105 MMOL/L (ref 99–110)
CHLORIDE BLD-SCNC: 99 MMOL/L (ref 99–110)
CO2: 21 MMOL/L (ref 21–32)
CO2: 22 MMOL/L (ref 21–32)
CREAT SERPL-MCNC: 0.6 MG/DL (ref 0.9–1.3)
CREAT SERPL-MCNC: 0.8 MG/DL (ref 0.9–1.3)
FIBRINOGEN: 227 MG/DL (ref 200–397)
FIBRINOGEN: 241 MG/DL (ref 200–397)
FIBRINOGEN: 241 MG/DL (ref 200–397)
GFR AFRICAN AMERICAN: >60
GFR AFRICAN AMERICAN: >60
GFR NON-AFRICAN AMERICAN: >60
GFR NON-AFRICAN AMERICAN: >60
GLUCOSE BLD-MCNC: 95 MG/DL (ref 70–99)
GLUCOSE BLD-MCNC: 97 MG/DL (ref 70–99)
HCT VFR BLD CALC: 39.7 % (ref 40.5–52.5)
HCT VFR BLD CALC: 40 % (ref 40.5–52.5)
HCT VFR BLD CALC: 40.6 % (ref 40.5–52.5)
HEMOGLOBIN: 13.5 G/DL (ref 13.5–17.5)
HEMOGLOBIN: 13.6 G/DL (ref 13.5–17.5)
HEMOGLOBIN: 14 G/DL (ref 13.5–17.5)
MCH RBC QN AUTO: 33.5 PG (ref 26–34)
MCH RBC QN AUTO: 33.5 PG (ref 26–34)
MCH RBC QN AUTO: 33.6 PG (ref 26–34)
MCHC RBC AUTO-ENTMCNC: 33.6 G/DL (ref 31–36)
MCHC RBC AUTO-ENTMCNC: 34.1 G/DL (ref 31–36)
MCHC RBC AUTO-ENTMCNC: 34.4 G/DL (ref 31–36)
MCV RBC AUTO: 97.9 FL (ref 80–100)
MCV RBC AUTO: 98.3 FL (ref 80–100)
MCV RBC AUTO: 99.7 FL (ref 80–100)
PDW BLD-RTO: 13.9 % (ref 12.4–15.4)
PDW BLD-RTO: 13.9 % (ref 12.4–15.4)
PDW BLD-RTO: 14.1 % (ref 12.4–15.4)
PLATELET # BLD: 155 K/UL (ref 135–450)
PLATELET # BLD: 157 K/UL (ref 135–450)
PLATELET # BLD: 158 K/UL (ref 135–450)
PMV BLD AUTO: 7.5 FL (ref 5–10.5)
PMV BLD AUTO: 7.5 FL (ref 5–10.5)
PMV BLD AUTO: 7.8 FL (ref 5–10.5)
POC ACT LR: 183 SEC
POTASSIUM SERPL-SCNC: 4.1 MMOL/L (ref 3.5–5.1)
POTASSIUM SERPL-SCNC: 4.7 MMOL/L (ref 3.5–5.1)
RBC # BLD: 4.02 M/UL (ref 4.2–5.9)
RBC # BLD: 4.04 M/UL (ref 4.2–5.9)
RBC # BLD: 4.15 M/UL (ref 4.2–5.9)
SODIUM BLD-SCNC: 135 MMOL/L (ref 136–145)
SODIUM BLD-SCNC: 140 MMOL/L (ref 136–145)
WBC # BLD: 10.1 K/UL (ref 4–11)
WBC # BLD: 10.2 K/UL (ref 4–11)
WBC # BLD: 11.8 K/UL (ref 4–11)

## 2022-03-22 PROCEDURE — C1887 CATHETER, GUIDING: HCPCS

## 2022-03-22 PROCEDURE — 6370000000 HC RX 637 (ALT 250 FOR IP): Performed by: SURGERY

## 2022-03-22 PROCEDURE — 99152 MOD SED SAME PHYS/QHP 5/>YRS: CPT | Performed by: SURGERY

## 2022-03-22 PROCEDURE — 37213 THROMBLYTIC ART/VEN THERAPY: CPT | Performed by: SURGERY

## 2022-03-22 PROCEDURE — 94640 AIRWAY INHALATION TREATMENT: CPT

## 2022-03-22 PROCEDURE — 6360000004 HC RX CONTRAST MEDICATION: Performed by: SURGERY

## 2022-03-22 PROCEDURE — 6360000002 HC RX W HCPCS: Performed by: SURGERY

## 2022-03-22 PROCEDURE — 99152 MOD SED SAME PHYS/QHP 5/>YRS: CPT

## 2022-03-22 PROCEDURE — C1769 GUIDE WIRE: HCPCS

## 2022-03-22 PROCEDURE — 99153 MOD SED SAME PHYS/QHP EA: CPT

## 2022-03-22 PROCEDURE — C1751 CATH, INF, PER/CENT/MIDLINE: HCPCS

## 2022-03-22 PROCEDURE — APPNB30 APP NON BILLABLE TIME 0-30 MINS: Performed by: NURSE PRACTITIONER

## 2022-03-22 PROCEDURE — 85730 THROMBOPLASTIN TIME PARTIAL: CPT

## 2022-03-22 PROCEDURE — 2580000003 HC RX 258: Performed by: SURGERY

## 2022-03-22 PROCEDURE — 6360000002 HC RX W HCPCS

## 2022-03-22 PROCEDURE — 37213 THROMBLYTIC ART/VEN THERAPY: CPT

## 2022-03-22 PROCEDURE — 2580000003 HC RX 258: Performed by: NURSE PRACTITIONER

## 2022-03-22 PROCEDURE — 85027 COMPLETE CBC AUTOMATED: CPT

## 2022-03-22 PROCEDURE — 85347 COAGULATION TIME ACTIVATED: CPT

## 2022-03-22 PROCEDURE — 94761 N-INVAS EAR/PLS OXIMETRY MLT: CPT

## 2022-03-22 PROCEDURE — 80048 BASIC METABOLIC PNL TOTAL CA: CPT

## 2022-03-22 PROCEDURE — APPSS30 APP SPLIT SHARED TIME 16-30 MINUTES: Performed by: NURSE PRACTITIONER

## 2022-03-22 PROCEDURE — 85384 FIBRINOGEN ACTIVITY: CPT

## 2022-03-22 PROCEDURE — 3E05317 INTRODUCTION OF OTHER THROMBOLYTIC INTO PERIPHERAL ARTERY, PERCUTANEOUS APPROACH: ICD-10-PCS | Performed by: SURGERY

## 2022-03-22 PROCEDURE — 2000000000 HC ICU R&B

## 2022-03-22 RX ORDER — IODIXANOL 320 MG/ML
20 INJECTION, SOLUTION INTRAVASCULAR ONCE
Status: COMPLETED | OUTPATIENT
Start: 2022-03-22 | End: 2022-03-22

## 2022-03-22 RX ORDER — SODIUM CHLORIDE 9 MG/ML
INJECTION, SOLUTION INTRAVENOUS CONTINUOUS
Status: DISCONTINUED | OUTPATIENT
Start: 2022-03-22 | End: 2022-03-23

## 2022-03-22 RX ADMIN — ATORVASTATIN CALCIUM 40 MG: 40 TABLET, FILM COATED ORAL at 20:00

## 2022-03-22 RX ADMIN — Medication 2 PUFF: at 08:17

## 2022-03-22 RX ADMIN — SODIUM CHLORIDE, PRESERVATIVE FREE 10 ML: 5 INJECTION INTRAVENOUS at 20:50

## 2022-03-22 RX ADMIN — MORPHINE SULFATE 2 MG: 2 INJECTION, SOLUTION INTRAMUSCULAR; INTRAVENOUS at 21:35

## 2022-03-22 RX ADMIN — SODIUM CHLORIDE: 9 INJECTION, SOLUTION INTRAVENOUS at 09:11

## 2022-03-22 RX ADMIN — IODIXANOL 20 ML: 320 INJECTION, SOLUTION INTRAVASCULAR at 16:05

## 2022-03-22 RX ADMIN — Medication 2 PUFF: at 20:26

## 2022-03-22 RX ADMIN — IPRATROPIUM BROMIDE AND ALBUTEROL SULFATE 1 AMPULE: .5; 3 SOLUTION RESPIRATORY (INHALATION) at 08:17

## 2022-03-22 RX ADMIN — HYDROCODONE BITARTRATE AND ACETAMINOPHEN 1 TABLET: 5; 325 TABLET ORAL at 10:36

## 2022-03-22 RX ADMIN — ALTEPLASE 1 MG/HR: 2.2 INJECTION, POWDER, LYOPHILIZED, FOR SOLUTION INTRAVENOUS at 03:20

## 2022-03-22 RX ADMIN — SODIUM CHLORIDE, PRESERVATIVE FREE 10 ML: 5 INJECTION INTRAVENOUS at 10:37

## 2022-03-22 RX ADMIN — MORPHINE SULFATE 2 MG: 2 INJECTION, SOLUTION INTRAMUSCULAR; INTRAVENOUS at 02:34

## 2022-03-22 RX ADMIN — IPRATROPIUM BROMIDE AND ALBUTEROL SULFATE 1 AMPULE: .5; 3 SOLUTION RESPIRATORY (INHALATION) at 20:26

## 2022-03-22 RX ADMIN — HEPARIN SODIUM 500 UNITS/HR: 5000 INJECTION INTRAVENOUS; SUBCUTANEOUS at 16:36

## 2022-03-22 RX ADMIN — HYDROCODONE BITARTRATE AND ACETAMINOPHEN 1 TABLET: 5; 325 TABLET ORAL at 18:50

## 2022-03-22 RX ADMIN — SODIUM CHLORIDE: 9 INJECTION, SOLUTION INTRAVENOUS at 20:49

## 2022-03-22 RX ADMIN — IPRATROPIUM BROMIDE AND ALBUTEROL SULFATE 1 AMPULE: .5; 3 SOLUTION RESPIRATORY (INHALATION) at 17:04

## 2022-03-22 RX ADMIN — HYDROCODONE BITARTRATE AND ACETAMINOPHEN 1 TABLET: 5; 325 TABLET ORAL at 04:35

## 2022-03-22 ASSESSMENT — PAIN DESCRIPTION - LOCATION
LOCATION: FOOT

## 2022-03-22 ASSESSMENT — PAIN SCALES - GENERAL
PAINLEVEL_OUTOF10: 5
PAINLEVEL_OUTOF10: 8
PAINLEVEL_OUTOF10: 3
PAINLEVEL_OUTOF10: 3
PAINLEVEL_OUTOF10: 7
PAINLEVEL_OUTOF10: 7
PAINLEVEL_OUTOF10: 3
PAINLEVEL_OUTOF10: 3
PAINLEVEL_OUTOF10: 8
PAINLEVEL_OUTOF10: 4
PAINLEVEL_OUTOF10: 0
PAINLEVEL_OUTOF10: 3
PAINLEVEL_OUTOF10: 8
PAINLEVEL_OUTOF10: 6
PAINLEVEL_OUTOF10: 7

## 2022-03-22 ASSESSMENT — PAIN DESCRIPTION - ORIENTATION
ORIENTATION: LEFT

## 2022-03-22 ASSESSMENT — PAIN DESCRIPTION - PAIN TYPE
TYPE: ACUTE PAIN

## 2022-03-22 NOTE — PLAN OF CARE
Problem: Falls - Risk of:  Goal: Will remain free from falls  Description: Will remain free from falls  Outcome: Met This Shift     Problem: Tissue Perfusion - Peripheral, Altered:  Goal: Absence of hematoma at arterial access site  Description: Absence of hematoma at arterial access site  Outcome: Met This Shift     Problem: Pain:  Goal: Pain level will decrease  Description: Pain level will decrease  Outcome: Ongoing  Goal: Control of acute pain  Description: Control of acute pain  Outcome: Ongoing

## 2022-03-22 NOTE — PROGRESS NOTES
Pt returned from cath lab. EKOS intact R groin. R groin soft, no ooze. L post tib pulse noted per doppler.

## 2022-03-22 NOTE — PROGRESS NOTES
[facility] PROGRESS NOTE    3/22/2022 1:26 PM        Name: Oscar Ocasio . Admitted: 3/18/2022  Primary Care Provider: ORLANDO Conde CNP (Tel: [phone])      Subjective:  . Admitted with left lower leg ischemia and pain   Had L leg angiogram with arteriolysis yesterday. Still has EKOS catheter in. Going back to cath lab today. Pain nearly resolved.      Smoker of 25 years of cigarettes, now smokes 2 cigars per day       Reviewed interval ancillary notes    Current Medications  0.9 % sodium chloride infusion, Continuous  alteplase (CATHFLO) 12.5 mg in sodium chloride 0.9 % 250 mL infusion, Continuous  heparin 25,000 units in dextrose 5% 250 mL (premix) infusion, Continuous  sodium chloride flush 0.9 % injection 5-40 mL, 2 times per day  sodium chloride flush 0.9 % injection 5-40 mL, PRN  0.9 % sodium chloride infusion, PRN  ondansetron (ZOFRAN-ODT) disintegrating tablet 4 mg, Q8H PRN   Or  ondansetron (ZOFRAN) injection 4 mg, Q6H PRN  0.9 % sodium chloride infusion, Continuous PRN  morphine (PF) injection 2 mg, Q3H PRN  albuterol sulfate  (90 Base) MCG/ACT inhaler 2 puff, Q4H PRN  atorvastatin (LIPITOR) tablet 40 mg, Nightly  budesonide-formoterol (SYMBICORT) 160-4.5 MCG/ACT inhaler 2 puff, BID  polyethylene glycol (GLYCOLAX) packet 17 g, Daily PRN  acetaminophen (TYLENOL) tablet 650 mg, Q6H PRN   Or  acetaminophen (TYLENOL) suppository 650 mg, Q6H PRN  HYDROcodone-acetaminophen (NORCO) 5-325 MG per tablet 1 tablet, Q6H PRN  perflutren lipid microspheres (DEFINITY) injection 1.65 mg, ONCE PRN  ipratropium-albuterol (DUONEB) nebulizer solution 1 ampule, BID  ipratropium-albuterol (DUONEB) nebulizer solution 1 ampule, Q4H PRN        Objective:  /69   Pulse 90   Temp 97.4 °F (36.3 °C) (Temporal)   Resp 16   Ht 5' 7\" (1.702 m)   Wt 198 lb 3.2 oz (89.9 kg)   SpO2 96%   BMI 31.04 kg/m²     Intake/Output Summary (Last 24 hours) at 3/22/2022 1326  Last data filed at 3/22/2022 0710  Gross per 24 hour   Intake --   Output 1000 ml   Net -1000 ml      Wt Readings from Last 3 Encounters:   03/22/22 198 lb 3.2 oz (89.9 kg)   03/18/22 191 lb (86.6 kg)   03/18/22 191 lb (86.6 kg)       General appearance:  Appears comfortable, looks chronically ill   Eyes: Sclera clear. Pupils equal.  ENT: Moist oral mucosa. Trachea midline, no adenopathy. Cardiovascular: Regular rhythm, normal S1, S2. No murmur. No edema in lower extremities  Respiratory: Not using accessory muscles. Good inspiratory effort. Clear to auscultation bilaterally, no wheeze or crackles. GI: Abdomen soft with active bowel sounds no tenderness, not distended, no guarding   Musculoskeletal: No cyanosis in digits, neck supple  Neurology: CN 2-12 grossly intact. No speech or motor deficits  Psych: Normal affect. Alert and oriented in time, place and person  Skin: Feet warm B.    Labs and Tests:  CBC:   Recent Labs     03/21/22  2325 03/22/22  0540 03/22/22  1150   WBC 13.4* 11.8* 10.2   HGB 13.6 14.0 13.6    158 157     BMP:    Recent Labs     03/20/22  0218 03/21/22  1753 03/22/22  0540   * 137 135*   K 3.9 4.7 4.7   CL 98* 101 99   CO2 27 27 22   BUN 8 8 10   CREATININE 0.9 0.9 0.8*   GLUCOSE 116* 101* 97     Hepatic:   Recent Labs     03/20/22  0218   AST 22   ALT 25   BILITOT 0.6   ALKPHOS 59     COVID Negative     CTA  Abd Aorta   Near complete occlusion of the left popliteal artery.  Flow to the left   foot is demonstrated via the posterior tibial artery.       2.  Occlusion of the left common iliac artery with reconstitution at the   level of the external iliac artery by the inferior epigastric.       3.  Findings of mild focal pancreatitis in the tail is demonstrated.       4.  Diverticulosis.            Problem List  Principal Problem:    Ischemia of left lower extremity  Active Problems:    Left foot pain  Resolved Problems:    * No resolved hospital problems. *       Assessment & Plan:   1. Acute LLE arterial occlusion:  Still has thrombolysis catheter. Going back to cath lab this afternoon. Appreciate input from vascular . He is on ASA and statin therapy. 2. Leukocytosis:  Trending down , COVID is negative. Likely reactive from ischemia.    3. Tobacco use:  Cessation encouraged       Diet: Diet NPO Exceptions are: Sips of Water with Meds  Code:Full Code  DVT PPX      Kae Murdock PA-C   3/22/2022 1:26 PM

## 2022-03-22 NOTE — OP NOTE
[address]                     [facility], [address] [facility]                                OPERATIVE REPORT    PATIENT NAME: Deena Sanches            :        1970  MED REC NO:   [id number]                          ROOM:       8203  ACCOUNT NO:   [de-identified]                           ADMIT DATE: 2022  PROVIDER:     Franki De Souza MD    DATE OF PROCEDURE:  2022    PREPROCEDURE DIAGNOSIS:  Left lower extremity ischemia. POSTPROCEDURE DIAGNOSIS:  Left lower extremity ischemia. PROCEDURE:  1. Ultrasound-guided right common femoral artery access. 2.  Abdominal aortogram with third order selective left lower extremity  angiogram.  3.  Initiation of arterial thrombolysis left lower extremity (EKOS). SURGEON:  Franki De Souza MD    ANESTHESIA:  Local/IV. ASA 3 - Patient with moderate systemic disease with functional limitations    Mallampati Airway Assessment:  Mallampati Class II - (soft palate, fauces & uvula are visible)    ESTIMATED BLOOD LOSS:  Minimal.    COMPLICATIONS:  None. INDICATIONS:  The patient is a 33-year-old male who presented over the  weekend with new onset of rest pain of his left lower extremity. At the  time of evaluation, he was noted to have a warm foot. However, no  appreciable distal signals, and as a result, presents for angiographic  evaluation and possible thrombolysis. All risks, benefits, and  alternatives were discussed in detail. All questions were answered. PROCEDURE IN DETAIL:  After witnessed informed consent was obtained, the  patient was brought into the cath lab where under my supervision Versed  and fentanyl were administered intravenously for moderate sedation. Pulse oximetry, heart rate, and blood pressure were monitored by an  independent trained observer that was present. I spent 52 minutes of  face-to-face sedation time with the patient.   His right groin was  carefully prepped and draped. Ultrasound was used to identify the right  common femoral artery which was noted to be patent and image was saved  to the patient's permanent medical record. Under direct visualization,  it was accessed with a 4-Slovenian micropuncture needle. Bentson wire was  introduced and a 5-Slovenian sheath was placed. An Omni Flush catheter was  inserted to the level of renal arteries and an abdominal aortogram was  performed. It was pulled back and used to hook the aortic bifurcation,  and a selective angiogram showed thrombus throughout the common and  external iliac artery. This was able to be traversed. A wire was  advanced and a 0.035 Quick-Cross catheter was advanced into the common  femoral artery and selective angiogram was performed of the entire left  lower extremity. An Amplatz wire was introduced. A 7-Slovenian 25 cm  sheath was placed into the very proximal right common iliac artery. A  50-cm EKOS catheter was placed at the origin of the left common  extending into the distal SFA. A 10 mg TPA bolus was given and arterial  thrombolysis was initiated in standard manner. The catheter and sheath  were secured. A dry sterile dressing was applied. The patient  tolerated procedure well and will be brought back within 24 hours for  followup imaging. FINDINGS:  1. Abdominal aortogram.  Right and left renal patent. Infrarenal  abdominal aorta is widely patent. Right common external and internal  showed diffuse occlusive thrombus. The common femoral and profunda are  patent. SFA is patent. Popliteal is patent. There is complete  occlusion of all tibial vessels.         Jasmin Melton MD    D: 03/21/2022 16:51:11       T: 03/21/2022 16:55:00     [email or web address]  Job#: [id number]     Doc#: [id number]    CC:

## 2022-03-22 NOTE — PROGRESS NOTES
Vascular Progress Note    3/22/2022 8:44 AM    Chief complaint / Reason for visit : left leg ischemia     Subjective:  Patient resting in bed. He reports some pain in his left foot and leg but controlled with pain medication. EKOS infusing. VSS, afebrile.      Vital Signs: /69   Pulse 91   Temp 97.6 °F (36.4 °C) (Temporal)   Resp 16   Ht 5' 7\" (1.702 m)   Wt 198 lb 3.2 oz (89.9 kg)   SpO2 96%   BMI 31.04 kg/m²  O2 Flow Rate (L/min): 2 L/min   I/O:      Intake/Output Summary (Last 24 hours) at 3/22/2022 0844  Last data filed at 3/22/2022 0710  Gross per 24 hour   Intake --   Output 1000 ml   Net -1000 ml       Physical Exam:   General: no apparent distress, appears stated age  Chest/Lungs: clear to auscultation bilaterally, no accessory muscle use  Cardiac:  regular rate and rhythm, S1, S2 normal, no murmur, click, rub or gallop  Abdomen:  abdomen is soft without significant tenderness, masses, organomegaly or guarding  Vascular: + left PT signal   Extremities: left foot warmer, able to move toes and foot, some mottling to top of foot stable    Labs:   Lab Results   Component Value Date     03/22/2022    K 4.7 03/22/2022    K 3.9 03/20/2022    CL 99 03/22/2022    CO2 22 03/22/2022    BUN 10 03/22/2022    CREATININE 0.8 03/22/2022    GFRAA >60 03/22/2022    LABGLOM >60 03/22/2022    GLUCOSE 97 03/22/2022    CALCIUM 9.1 03/22/2022     Lab Results   Component Value Date    WBC 11.8 03/22/2022    RBC 4.15 03/22/2022    HGB 14.0 03/22/2022    HCT 40.6 03/22/2022    MCV 97.9 03/22/2022    RDW 13.9 03/22/2022     03/22/2022     Lab Results   Component Value Date    INR 1.17 (H) 03/18/2022    PROTIME 13.3 (H) 03/18/2022        Imaging:    CTA bilateral runoff 3/18/22:  Impression   1.  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  Flow to the left   foot is demonstrated via the posterior tibial artery.       2.  Occlusion of the left common iliac artery with reconstitution at the   level of the external iliac artery by the inferior epigastric.       3.  Findings of mild focal pancreatitis in the tail is demonstrated.       4.  Diverticulosis.             Scheduled Meds:    sodium chloride flush  5-40 mL IntraVENous 2 times per day    atorvastatin  40 mg Oral Nightly    budesonide-formoterol  2 puff Inhalation BID    ipratropium-albuterol  1 ampule Inhalation BID     Continuous Infusions:    alteplase (ACTIVASE) in 0.9% sodium chloride infusion (EKOS catheter) 1 mg/hr (03/22/22 0320)    heparin (PORCINE) Infusion 500 Units/hr (03/21/22 1726)    sodium chloride 35 mL/hr at 03/21/22 1726    sodium chloride      sodium chloride      lactated ringers 125 mL/hr at 03/21/22 1408         Assessment:   Acute left leg ischemia s/p left leg angiogram with initiation of arterial thrombolysis - POD # 1  HLD on statin  Tobacco use - 1 PPD    Plan:  Continue EKOS. Plan to take back to cath lab this afternoon for repeat angiogram following thrombolysis (EKOS). Continue NPO. Continue statin therapy. Smoking cessation. Patient educated on plan of care and disease process. All questions answered.         Electronically signed by ORLANDO Kate CNP on 3/22/2022 at 8:44 AM

## 2022-03-22 NOTE — OP NOTE
Brief Postoperative Note    Jeison Malhotra  YOB: 1970  [id number]    Pre-operative Diagnosis: LLE ischemia    Post-operative Diagnosis: Same    Procedure: follow up arterial thrombolysis LLE    Anesthesia: Local    Surgeons/Assistants: Dandre Maria    Estimated Blood Loss: less than 50     Complications: None    Specimens: Was Not Obtained    Findings: Resolution of left MADIHA, EIA, CFA thrombus.   Still with focal area at TP trunk and proximal tibial vessels but tibial vessels open and free of disease distal    Electronically signed by Jarod Staples MD on 3/22/2022 at 3:53 PM

## 2022-03-23 ENCOUNTER — APPOINTMENT (OUTPATIENT)
Dept: GENERAL RADIOLOGY | Age: 52
DRG: 182 | End: 2022-03-23
Payer: COMMERCIAL

## 2022-03-23 LAB
ANION GAP SERPL CALCULATED.3IONS-SCNC: 14 MMOL/L (ref 3–16)
APTT: 35.2 SEC (ref 26.2–38.6)
APTT: 35.3 SEC (ref 26.2–38.6)
APTT: 37.3 SEC (ref 26.2–38.6)
APTT: 37.5 SEC (ref 26.2–38.6)
APTT: 65 SEC (ref 26.2–38.6)
BILIRUBIN URINE: NEGATIVE
BLOOD, URINE: ABNORMAL
BUN BLDV-MCNC: 8 MG/DL (ref 7–20)
CALCIUM SERPL-MCNC: 8.7 MG/DL (ref 8.3–10.6)
CHLORIDE BLD-SCNC: 98 MMOL/L (ref 99–110)
CLARITY: CLEAR
CO2: 20 MMOL/L (ref 21–32)
COLOR: YELLOW
CREAT SERPL-MCNC: 0.8 MG/DL (ref 0.9–1.3)
CULTURE, BLOOD 2: NORMAL
EPITHELIAL CELLS, UA: 1 /HPF (ref 0–5)
FIBRINOGEN: 309 MG/DL (ref 200–397)
FIBRINOGEN: 309 MG/DL (ref 200–397)
FIBRINOGEN: 314 MG/DL (ref 200–397)
GFR AFRICAN AMERICAN: >60
GFR NON-AFRICAN AMERICAN: >60
GLUCOSE BLD-MCNC: 102 MG/DL (ref 70–99)
GLUCOSE URINE: NEGATIVE MG/DL
HCT VFR BLD CALC: 39.9 % (ref 40.5–52.5)
HCT VFR BLD CALC: 40.3 % (ref 40.5–52.5)
HCT VFR BLD CALC: 40.6 % (ref 40.5–52.5)
HEMOGLOBIN: 13.3 G/DL (ref 13.5–17.5)
HEMOGLOBIN: 13.3 G/DL (ref 13.5–17.5)
HEMOGLOBIN: 13.6 G/DL (ref 13.5–17.5)
HYALINE CASTS: 1 /LPF (ref 0–8)
KETONES, URINE: 80 MG/DL
LEUKOCYTE ESTERASE, URINE: ABNORMAL
MCH RBC QN AUTO: 32.2 PG (ref 26–34)
MCH RBC QN AUTO: 32.7 PG (ref 26–34)
MCH RBC QN AUTO: 33 PG (ref 26–34)
MCHC RBC AUTO-ENTMCNC: 33.1 G/DL (ref 31–36)
MCHC RBC AUTO-ENTMCNC: 33.3 G/DL (ref 31–36)
MCHC RBC AUTO-ENTMCNC: 33.6 G/DL (ref 31–36)
MCV RBC AUTO: 96.8 FL (ref 80–100)
MCV RBC AUTO: 98.4 FL (ref 80–100)
MCV RBC AUTO: 98.8 FL (ref 80–100)
MICROSCOPIC EXAMINATION: YES
NITRITE, URINE: NEGATIVE
PDW BLD-RTO: 13.9 % (ref 12.4–15.4)
PH UA: 7 (ref 5–8)
PLATELET # BLD: 160 K/UL (ref 135–450)
PLATELET # BLD: 161 K/UL (ref 135–450)
PLATELET # BLD: 161 K/UL (ref 135–450)
PMV BLD AUTO: 7.9 FL (ref 5–10.5)
PMV BLD AUTO: 7.9 FL (ref 5–10.5)
PMV BLD AUTO: 8 FL (ref 5–10.5)
POC ACT LR: 243 SEC
POC ACT LR: 261 SEC
POTASSIUM SERPL-SCNC: 4.6 MMOL/L (ref 3.5–5.1)
PROTEIN UA: NEGATIVE MG/DL
RBC # BLD: 4.07 M/UL (ref 4.2–5.9)
RBC # BLD: 4.12 M/UL (ref 4.2–5.9)
RBC # BLD: 4.13 M/UL (ref 4.2–5.9)
RBC UA: 3 /HPF (ref 0–4)
SODIUM BLD-SCNC: 132 MMOL/L (ref 136–145)
SPECIFIC GRAVITY UA: 1.02 (ref 1–1.03)
URINE REFLEX TO CULTURE: ABNORMAL
URINE TYPE: ABNORMAL
UROBILINOGEN, URINE: 4 E.U./DL
WBC # BLD: 11 K/UL (ref 4–11)
WBC # BLD: 11.5 K/UL (ref 4–11)
WBC # BLD: 12.5 K/UL (ref 4–11)
WBC UA: 5 /HPF (ref 0–5)

## 2022-03-23 PROCEDURE — 71045 X-RAY EXAM CHEST 1 VIEW: CPT

## 2022-03-23 PROCEDURE — 2000000000 HC ICU R&B

## 2022-03-23 PROCEDURE — 37184 PRIM ART M-THRMBC 1ST VSL: CPT

## 2022-03-23 PROCEDURE — APPSS30 APP SPLIT SHARED TIME 16-30 MINUTES: Performed by: NURSE PRACTITIONER

## 2022-03-23 PROCEDURE — 6360000002 HC RX W HCPCS

## 2022-03-23 PROCEDURE — C1887 CATHETER, GUIDING: HCPCS

## 2022-03-23 PROCEDURE — 80048 BASIC METABOLIC PNL TOTAL CA: CPT

## 2022-03-23 PROCEDURE — 99153 MOD SED SAME PHYS/QHP EA: CPT

## 2022-03-23 PROCEDURE — C1757 CATH, THROMBECTOMY/EMBOLECT: HCPCS

## 2022-03-23 PROCEDURE — 2700000000 HC OXYGEN THERAPY PER DAY

## 2022-03-23 PROCEDURE — 85730 THROMBOPLASTIN TIME PARTIAL: CPT

## 2022-03-23 PROCEDURE — APPNB30 APP NON BILLABLE TIME 0-30 MINS: Performed by: NURSE PRACTITIONER

## 2022-03-23 PROCEDURE — 6360000002 HC RX W HCPCS: Performed by: SURGERY

## 2022-03-23 PROCEDURE — 6370000000 HC RX 637 (ALT 250 FOR IP): Performed by: SURGERY

## 2022-03-23 PROCEDURE — 85027 COMPLETE CBC AUTOMATED: CPT

## 2022-03-23 PROCEDURE — 75774 ARTERY X-RAY EACH VESSEL: CPT

## 2022-03-23 PROCEDURE — 85384 FIBRINOGEN ACTIVITY: CPT

## 2022-03-23 PROCEDURE — C1769 GUIDE WIRE: HCPCS

## 2022-03-23 PROCEDURE — 6360000002 HC RX W HCPCS: Performed by: NURSE PRACTITIONER

## 2022-03-23 PROCEDURE — 94761 N-INVAS EAR/PLS OXIMETRY MLT: CPT

## 2022-03-23 PROCEDURE — 6370000000 HC RX 637 (ALT 250 FOR IP)

## 2022-03-23 PROCEDURE — 37184 PRIM ART M-THRMBC 1ST VSL: CPT | Performed by: SURGERY

## 2022-03-23 PROCEDURE — 37228 PR REVSC OPN/PRQ TIB/PERO W/ANGIOPLASTY UNI: CPT | Performed by: SURGERY

## 2022-03-23 PROCEDURE — 2709999900 HC NON-CHARGEABLE SUPPLY

## 2022-03-23 PROCEDURE — C1894 INTRO/SHEATH, NON-LASER: HCPCS

## 2022-03-23 PROCEDURE — 37213 THROMBLYTIC ART/VEN THERAPY: CPT | Performed by: SURGERY

## 2022-03-23 PROCEDURE — 99152 MOD SED SAME PHYS/QHP 5/>YRS: CPT | Performed by: SURGERY

## 2022-03-23 PROCEDURE — 6360000004 HC RX CONTRAST MEDICATION: Performed by: SURGERY

## 2022-03-23 PROCEDURE — 85347 COAGULATION TIME ACTIVATED: CPT

## 2022-03-23 PROCEDURE — 94640 AIRWAY INHALATION TREATMENT: CPT

## 2022-03-23 PROCEDURE — 2500000003 HC RX 250 WO HCPCS

## 2022-03-23 PROCEDURE — 2500000003 HC RX 250 WO HCPCS: Performed by: NURSE PRACTITIONER

## 2022-03-23 PROCEDURE — 99152 MOD SED SAME PHYS/QHP 5/>YRS: CPT

## 2022-03-23 PROCEDURE — 75710 ARTERY X-RAYS ARM/LEG: CPT

## 2022-03-23 PROCEDURE — C1725 CATH, TRANSLUMIN NON-LASER: HCPCS

## 2022-03-23 PROCEDURE — 2580000003 HC RX 258: Performed by: SURGERY

## 2022-03-23 PROCEDURE — 04CS3ZZ EXTIRPATION OF MATTER FROM LEFT POSTERIOR TIBIAL ARTERY, PERCUTANEOUS APPROACH: ICD-10-PCS | Performed by: SURGERY

## 2022-03-23 PROCEDURE — 81001 URINALYSIS AUTO W/SCOPE: CPT

## 2022-03-23 PROCEDURE — 37228 HC TIB PER TERRITORY PLASTY: CPT

## 2022-03-23 RX ORDER — SODIUM CHLORIDE 0.9 % (FLUSH) 0.9 %
5-40 SYRINGE (ML) INJECTION EVERY 12 HOURS SCHEDULED
Status: DISCONTINUED | OUTPATIENT
Start: 2022-03-23 | End: 2022-03-24 | Stop reason: HOSPADM

## 2022-03-23 RX ORDER — SODIUM CHLORIDE 0.9 % (FLUSH) 0.9 %
5-40 SYRINGE (ML) INJECTION PRN
Status: DISCONTINUED | OUTPATIENT
Start: 2022-03-23 | End: 2022-03-24 | Stop reason: HOSPADM

## 2022-03-23 RX ORDER — HEPARIN SODIUM 1000 [USP'U]/ML
40 INJECTION, SOLUTION INTRAVENOUS; SUBCUTANEOUS PRN
Status: DISCONTINUED | OUTPATIENT
Start: 2022-03-23 | End: 2022-03-24

## 2022-03-23 RX ORDER — HEPARIN SODIUM 1000 [USP'U]/ML
80 INJECTION, SOLUTION INTRAVENOUS; SUBCUTANEOUS PRN
Status: DISCONTINUED | OUTPATIENT
Start: 2022-03-23 | End: 2022-03-24

## 2022-03-23 RX ORDER — SODIUM CHLORIDE 9 MG/ML
25 INJECTION, SOLUTION INTRAVENOUS PRN
Status: DISCONTINUED | OUTPATIENT
Start: 2022-03-23 | End: 2022-03-24 | Stop reason: HOSPADM

## 2022-03-23 RX ORDER — ACETAMINOPHEN 325 MG/1
650 TABLET ORAL EVERY 4 HOURS PRN
Status: DISCONTINUED | OUTPATIENT
Start: 2022-03-23 | End: 2022-03-24 | Stop reason: HOSPADM

## 2022-03-23 RX ORDER — HEPARIN SODIUM 10000 [USP'U]/100ML
5-30 INJECTION, SOLUTION INTRAVENOUS CONTINUOUS
Status: DISCONTINUED | OUTPATIENT
Start: 2022-03-23 | End: 2022-03-24

## 2022-03-23 RX ORDER — METOPROLOL TARTRATE 5 MG/5ML
2.5 INJECTION INTRAVENOUS EVERY 6 HOURS PRN
Status: DISCONTINUED | OUTPATIENT
Start: 2022-03-23 | End: 2022-03-24 | Stop reason: HOSPADM

## 2022-03-23 RX ORDER — IODIXANOL 320 MG/ML
50 INJECTION, SOLUTION INTRAVASCULAR ONCE
Status: COMPLETED | OUTPATIENT
Start: 2022-03-23 | End: 2022-03-23

## 2022-03-23 RX ORDER — ATROPINE SULFATE 0.1 MG/ML
INJECTION INTRAVENOUS
Status: DISCONTINUED
Start: 2022-03-23 | End: 2022-03-23 | Stop reason: WASHOUT

## 2022-03-23 RX ORDER — HYDRALAZINE HYDROCHLORIDE 20 MG/ML
5 INJECTION INTRAMUSCULAR; INTRAVENOUS EVERY 6 HOURS PRN
Status: DISCONTINUED | OUTPATIENT
Start: 2022-03-23 | End: 2022-03-24 | Stop reason: HOSPADM

## 2022-03-23 RX ADMIN — MORPHINE SULFATE 2 MG: 2 INJECTION, SOLUTION INTRAMUSCULAR; INTRAVENOUS at 05:09

## 2022-03-23 RX ADMIN — IPRATROPIUM BROMIDE AND ALBUTEROL SULFATE 1 AMPULE: .5; 3 SOLUTION RESPIRATORY (INHALATION) at 08:52

## 2022-03-23 RX ADMIN — Medication 15 UNITS/KG/HR: at 17:47

## 2022-03-23 RX ADMIN — IPRATROPIUM BROMIDE AND ALBUTEROL SULFATE 1 AMPULE: .5; 3 SOLUTION RESPIRATORY (INHALATION) at 19:59

## 2022-03-23 RX ADMIN — SODIUM CHLORIDE, PRESERVATIVE FREE 10 ML: 5 INJECTION INTRAVENOUS at 09:36

## 2022-03-23 RX ADMIN — Medication 2 PUFF: at 19:58

## 2022-03-23 RX ADMIN — Medication 2 PUFF: at 08:54

## 2022-03-23 RX ADMIN — CEFAZOLIN SODIUM 2000 MG: 10 INJECTION, POWDER, FOR SOLUTION INTRAVENOUS at 20:17

## 2022-03-23 RX ADMIN — ALTEPLASE 1 MG/HR: 2.2 INJECTION, POWDER, LYOPHILIZED, FOR SOLUTION INTRAVENOUS at 06:03

## 2022-03-23 RX ADMIN — HYDROCODONE BITARTRATE AND ACETAMINOPHEN 1 TABLET: 5; 325 TABLET ORAL at 07:59

## 2022-03-23 RX ADMIN — IODIXANOL 50 ML: 320 INJECTION, SOLUTION INTRAVASCULAR at 14:01

## 2022-03-23 RX ADMIN — HYDROCODONE BITARTRATE AND ACETAMINOPHEN 1 TABLET: 5; 325 TABLET ORAL at 00:11

## 2022-03-23 RX ADMIN — METOPROLOL TARTRATE 2.5 MG: 1 INJECTION, SOLUTION INTRAVENOUS at 09:35

## 2022-03-23 RX ADMIN — ATORVASTATIN CALCIUM 40 MG: 40 TABLET, FILM COATED ORAL at 20:17

## 2022-03-23 RX ADMIN — CEFAZOLIN SODIUM 2000 MG: 10 INJECTION, POWDER, FOR SOLUTION INTRAVENOUS at 12:23

## 2022-03-23 RX ADMIN — HYDRALAZINE HYDROCHLORIDE 5 MG: 20 INJECTION, SOLUTION INTRAMUSCULAR; INTRAVENOUS at 15:53

## 2022-03-23 ASSESSMENT — PAIN SCALES - GENERAL
PAINLEVEL_OUTOF10: 2
PAINLEVEL_OUTOF10: 0
PAINLEVEL_OUTOF10: 0
PAINLEVEL_OUTOF10: 7
PAINLEVEL_OUTOF10: 8
PAINLEVEL_OUTOF10: 8
PAINLEVEL_OUTOF10: 7
PAINLEVEL_OUTOF10: 8

## 2022-03-23 ASSESSMENT — PAIN DESCRIPTION - DESCRIPTORS
DESCRIPTORS: NUMBNESS;TINGLING
DESCRIPTORS: ACHING

## 2022-03-23 ASSESSMENT — PAIN DESCRIPTION - LOCATION
LOCATION: LEG
LOCATION: FOOT

## 2022-03-23 ASSESSMENT — PAIN DESCRIPTION - ORIENTATION
ORIENTATION: LEFT
ORIENTATION: LEFT

## 2022-03-23 ASSESSMENT — PAIN DESCRIPTION - ONSET: ONSET: ON-GOING

## 2022-03-23 ASSESSMENT — PAIN DESCRIPTION - PAIN TYPE
TYPE: ACUTE PAIN
TYPE: ACUTE PAIN

## 2022-03-23 ASSESSMENT — PAIN DESCRIPTION - PROGRESSION: CLINICAL_PROGRESSION: NOT CHANGED

## 2022-03-23 ASSESSMENT — PAIN DESCRIPTION - FREQUENCY: FREQUENCY: CONTINUOUS

## 2022-03-23 NOTE — RT PROTOCOL NOTE
RT Inhaler-Nebulizer Bronchodilator Protocol Note    There is a bronchodilator order in the chart from a provider indicating to follow the RT Bronchodilator Protocol and there is an Initiate RT Inhaler-Nebulizer Bronchodilator Protocol order as well (see protocol at bottom of note). CXR Findings:  No results found. The findings from the last RT Protocol Assessment were as follows:   History Pulmonary Disease: Chronic pulmonary disease  Respiratory Pattern: Regular pattern and RR 12-20 bpm  Breath Sounds: Slightly diminished and/or crackles  Cough: Strong, spontaneous, non-productive  Indication for Bronchodilator Therapy: Decreased or absent breath sounds  Bronchodilator Assessment Score: 4    Aerosolized bronchodilator medication orders have been revised according to the RT Inhaler-Nebulizer Bronchodilator Protocol below. Respiratory Therapist to perform RT Therapy Protocol Assessment initially then follow the protocol. Repeat RT Therapy Protocol Assessment PRN for score 0-3 or on second treatment, BID, and PRN for scores above 3. No Indications - adjust the frequency to every 6 hours PRN wheezing or bronchospasm, if no treatments needed after 48 hours then discontinue using Per Protocol order mode. If indication present, adjust the RT bronchodilator orders based on the Bronchodilator Assessment Score as indicated below. Use Inhaler orders unless patient has one or more of the following: on home nebulizer, not able to hold breath for 10 seconds, is not alert and oriented, cannot activate and use MDI correctly, or respiratory rate 25 breaths per minute or more, then use the equivalent nebulizer order(s) with same Frequency and PRN reasons based on the score. If a patient is on this medication at home then do not decrease Frequency below that used at home.     0-3 - enter or revise RT bronchodilator order(s) to equivalent RT Bronchodilator order with Frequency of every 4 hours PRN for wheezing or increased work of breathing using Per Protocol order mode. 4-6 - enter or revise RT Bronchodilator order(s) to two equivalent RT bronchodilator orders with one order with BID Frequency and one order with Frequency of every 4 hours PRN wheezing or increased work of breathing using Per Protocol order mode. 7-10 - enter or revise RT Bronchodilator order(s) to two equivalent RT bronchodilator orders with one order with TID Frequency and one order with Frequency of every 4 hours PRN wheezing or increased work of breathing using Per Protocol order mode. 11-13 - enter or revise RT Bronchodilator order(s) to one equivalent RT bronchodilator order with QID Frequency and an Albuterol order with Frequency of every 4 hours PRN wheezing or increased work of breathing using Per Protocol order mode. Greater than 13 - enter or revise RT Bronchodilator order(s) to one equivalent RT bronchodilator order with every 4 hours Frequency and an Albuterol order with Frequency of every 2 hours PRN wheezing or increased work of breathing using Per Protocol order mode. RT to enter RT Home Evaluation for COPD & MDI Assessment order using Per Protocol order mode.     Electronically signed by Jonah Esparza RCP on 3/22/2022 at 8:31 PM

## 2022-03-23 NOTE — PLAN OF CARE
Post procedure site check completed. Line still in place rt groin. Surgical site is within normal limits and appears to be free of complications. All concerns were reviewed and questions answered. Patient verbalized understanding.

## 2022-03-23 NOTE — PROGRESS NOTES
Vascular Progress Note    3/23/2022 8:19 AM    Chief complaint / Reason for visit : left leg ischemia     Subjective:  Patient resting in bed. He reports his pain is improving to his left foot. No other complaints. VSS, afebrile. EKOS running.     Vital Signs: BP (!) 162/88   Pulse 113   Temp 98.2 °F (36.8 °C) (Temporal)   Resp 12   Ht 5' 7\" (1.702 m)   Wt 174 lb 9.7 oz (79.2 kg)   SpO2 92%   BMI 27.35 kg/m²  O2 Flow Rate (L/min): 2 L/min   I/O:      Intake/Output Summary (Last 24 hours) at 3/23/2022 3412  Last data filed at 3/23/2022 0800  Gross per 24 hour   Intake 4298.2 ml   Output 2050 ml   Net 2248.2 ml       Physical Exam:   General: no apparent distress, appears stated age  Chest/Lungs: clear to auscultation bilaterally, no accessory muscle use  Cardiac:  regular rate and rhythm, S1, S2 normal, no murmur, click, rub or gallop  Abdomen:  abdomen is soft without significant tenderness, masses, organomegaly or guarding  Vascular: + strong left PT and DP signal   Extremities: left foot warm, motor and sensory intact, color improved, no cyanosis or mottling noted      Labs:   Lab Results   Component Value Date     03/23/2022    K 4.6 03/23/2022    K 3.9 03/20/2022    CL 98 03/23/2022    CO2 20 03/23/2022    BUN 8 03/23/2022    CREATININE 0.8 03/23/2022    GFRAA >60 03/23/2022    LABGLOM >60 03/23/2022    GLUCOSE 102 03/23/2022    CALCIUM 8.7 03/23/2022     Lab Results   Component Value Date    WBC 11.5 03/23/2022    RBC 4.13 03/23/2022    HGB 13.6 03/23/2022    HCT 40.6 03/23/2022    MCV 98.4 03/23/2022    RDW 13.9 03/23/2022     03/23/2022     Lab Results   Component Value Date    INR 1.17 (H) 03/18/2022    PROTIME 13.3 (H) 03/18/2022        Imaging:    CTA bilateral runoff 3/18/22:  Impression   1.  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  Flow to the left   foot is demonstrated via the posterior tibial artery.       2.  Occlusion of the left common iliac artery with reconstitution at the   level of the external iliac artery by the inferior epigastric.       3.  Findings of mild focal pancreatitis in the tail is demonstrated.       4.  Diverticulosis.             Scheduled Meds:    sodium chloride flush  5-40 mL IntraVENous 2 times per day    atorvastatin  40 mg Oral Nightly    budesonide-formoterol  2 puff Inhalation BID    ipratropium-albuterol  1 ampule Inhalation BID     Continuous Infusions:    sodium chloride 100 mL/hr at 03/22/22 2228    alteplase (ACTIVASE) in 0.9% sodium chloride infusion (EKOS catheter) 1 mg/hr (03/23/22 0603)    heparin (PORCINE) Infusion 500 Units/hr (03/22/22 2228)    sodium chloride           Assessment:   Acute left leg ischemia s/p left leg angiogram with initiation of arterial thrombolysis and follow up angiogram - POD # 2/1  HLD on statin  HTN - SBP 160s  Tobacco use - 1 PPD    Plan:  Continue EKOS. Plan to take back to cath lab this afternoon for repeat angiogram following thrombolysis (EKOS). Continue NPO okay for ice chips. Continue statin therapy. Smoking cessation. Patient educated on plan of care and disease process. All questions answered.         Electronically signed by ORLANDO Mistry CNP on 3/23/2022 at 8:19 AM I have personally performed a face to face diagnostic evaluation on this patient. I have reviewed the ACP note and agree with the history, exam and plan of care, except as noted.

## 2022-03-23 NOTE — OP NOTE
Brief Postoperative Note    Bella Lucia  YOB: 1970  [id number]    Pre-operative Diagnosis: LLE ischemia    Post-operative Diagnosis: Same    Procedure: 1. Follow up LLE arterial lysis  2. Left PT thrombectomy ([place]umb[place])  3.   Left PT PTA      Anesthesia: Local    Surgeons/Assistants: Jayson Genao    Estimated Blood Loss: less than 50     Complications: None    Specimens: Was Not Obtained    Findings: left PT thrombus, chronic occlusion left AT    Electronically signed by Ava Asif MD on 3/23/2022 at 1:59 PM

## 2022-03-23 NOTE — OP NOTE
[address]                     [address] [facility], [address] [facility]                                OPERATIVE REPORT    PATIENT NAME: Mariaa Ramirez            :        1970  MED REC NO:   [id number]                          ROOM:       3152  ACCOUNT NO:   [de-identified]                           ADMIT DATE: 2022  PROVIDER:     Ruth Ann Reynoso MD    DATE OF PROCEDURE:  2022    PREPROCEDURE DIAGNOSIS:  Left lower extremity ischemia. POSTPROCEDURE DIAGNOSIS:  Left lower extremity ischemia. PROCEDURE:  1. Followup arterial thrombolysis, left lower extremity. 2.  Continuation of arterial thrombolysis, left lower extremity. SURGEON:  Ruth Ann Reynoso MD    ANESTHESIA:  Local/IV. ESTIMATED BLOOD LOSS:  Minimal.    COMPLICATIONS:  None. INDICATION:  The patient is a 73-year-old male who underwent initiation  of arterial thrombolysis yesterday and is being brought back for  followup imaging. All risks, benefits, and alternative were discussed  in detail. All questions were answered. PROCEDURE IN DETAIL:  after witnessed informed consent was obtained, the  patient was brought into cath lab where under my supervision Versed and  fentanyl were administered intravenously for moderate sedation. Pulse  oximetry, heart rate, and blood pressure were monitored by an  independent trained observer that was present. I spent 28 minutes of  face-to-face sedation time with the patient. His right groin catheter  and exit site were carefully prepped and draped. The inner core wire  was removed and a 0.014 Command wire was advanced distally, and  angiogram was performed of the entire left lower extremity and iliac  showing near complete resolution of all thrombus within the left common  external iliac and common femoral.  There was a small focal area of  high-grade stenosis at the proximal external iliac artery.    Additionally, there was recanalization with normal peroneal and  posterior tibial; however, at the origin, there was still residual  thrombus in this location. As a result, this was able to be crossed  with a wire and 30-cm EKOS catheter was then advanced through the distal  popliteal and into the peroneal artery, and arterial thrombolysis was  once again initiated with hopes of resolution of the remainder of the  tibial thrombus. The catheter was secured. The patient tolerated the  procedure well and was transferred to the ICU in stable condition.         Matthew Osorio MD    D: 03/22/2022 15:58:17       T: 03/22/2022 16:00:50     [email or web address]  Job#: [id number]     Doc#: [id number]    CC:

## 2022-03-23 NOTE — OP NOTE
removal of the thrombus that did reveal an underlying stenosis  of approximately 60 mm. As a result, balloon angioplasty was performed  using a Tow 3 x 150 followed by 3.5 x 100 with complete resolution of  the stenosis and brisk flow. There was flow into the foot through a  widely patent and dominant posterior tibial.  There was a complete pedal  arch and flow was seen into the dorsalis pedis and anterior tibial in  the distal calf. The procedure was terminated at this point. Short  7-Nepali sheath was placed and he was transferred to the recovery room  in stable condition. FINDINGS:  Widely patent left common, external and internal iliac,  common femoral, profunda, SFA and popliteal.  There is chronic occlusion  of the anterior tibial.  The peroneal is diminutive. The posterior  tibial is approximately 3.5 to 4 mm and there was a stenosis of  approximately 90% along a 60 mm section that has resolved with balloon  angioplasty. He will be placed on aspirin, Plavix and anticoagulation  and to follow up in the office after discharge.         Bobby Jay MD    D: 03/23/2022 14:04:54       T: 03/23/2022 14:07:17     [email or web address]  Job#: [id number]     Doc#: [id number]    CC:

## 2022-03-23 NOTE — PROGRESS NOTES
[facility]    3/23/2022 12:34 PM        Name: Clayton Liu . Admitted: 3/18/2022  Primary Care Provider: ORLANDO Dasilva CNP (Tel: [phone])      Subjective:  . Admitted with left lower leg ischemia and pain   Had L leg angiogram with arteriolysis 3/21. Returned to cath lab on 3/22 and still had focal area at TP trunk and prox tibial vessels. Still has EKOS catheter in. Going back to cath lab today. Pain nearly resolved.      No Cp or SOB      Reviewed interval ancillary notes    Current Medications  metoprolol (LOPRESSOR) injection 2.5 mg, Q6H PRN  hydrALAZINE (APRESOLINE) injection 5 mg, Q6H PRN  ceFAZolin (ANCEF) 2000 mg in dextrose 5 % 50 mL IVPB, Q8H  alteplase (CATHFLO) 12.5 mg in sodium chloride 0.9 % 250 mL infusion, Continuous  heparin 25,000 units in dextrose 5% 250 mL (premix) infusion, Continuous  sodium chloride flush 0.9 % injection 5-40 mL, 2 times per day  sodium chloride flush 0.9 % injection 5-40 mL, PRN  0.9 % sodium chloride infusion, PRN  ondansetron (ZOFRAN-ODT) disintegrating tablet 4 mg, Q8H PRN   Or  ondansetron (ZOFRAN) injection 4 mg, Q6H PRN  morphine (PF) injection 2 mg, Q3H PRN  albuterol sulfate  (90 Base) MCG/ACT inhaler 2 puff, Q4H PRN  atorvastatin (LIPITOR) tablet 40 mg, Nightly  budesonide-formoterol (SYMBICORT) 160-4.5 MCG/ACT inhaler 2 puff, BID  polyethylene glycol (GLYCOLAX) packet 17 g, Daily PRN  acetaminophen (TYLENOL) tablet 650 mg, Q6H PRN   Or  acetaminophen (TYLENOL) suppository 650 mg, Q6H PRN  HYDROcodone-acetaminophen (NORCO) 5-325 MG per tablet 1 tablet, Q6H PRN  perflutren lipid microspheres (DEFINITY) injection 1.65 mg, ONCE PRN  ipratropium-albuterol (DUONEB) nebulizer solution 1 ampule, BID  ipratropium-albuterol (DUONEB) nebulizer solution 1 ampule, Q4H PRN        Objective:  BP (!) 154/85   Pulse 93   Temp 98.1 °F (36.7 °C) (Temporal)   Resp 20   Ht 5' 7\" (1.702 m)   Wt 174 lb 9.7 oz (79.2 kg)   SpO2 92%   BMI 27.35 kg/m²     Intake/Output Summary (Last 24 hours) at 3/23/2022 1234  Last data filed at 3/23/2022 1132  Gross per 24 hour   Intake 5368.8 ml   Output 2850 ml   Net 2518.8 ml      Wt Readings from Last 3 Encounters:   03/23/22 174 lb 9.7 oz (79.2 kg)   03/18/22 191 lb (86.6 kg)   03/18/22 191 lb (86.6 kg)       General appearance:  Appears comfortable, looks chronically ill   Eyes: Sclera clear. Pupils equal.  ENT: Moist oral mucosa. Trachea midline, no adenopathy. Cardiovascular: Regular rhythm, normal S1, S2. No murmur. No edema in lower extremities  Respiratory: Not using accessory muscles. Good inspiratory effort. Clear to auscultation bilaterally, no wheeze or crackles. GI: Abdomen soft with active bowel sounds no tenderness, not distended, no guarding   Musculoskeletal: No cyanosis in digits, neck supple  Neurology: CN 2-12 grossly intact. No speech or motor deficits  Psych: Normal affect. Alert and oriented in time, place and person  Skin: Feet warm B.    Labs and Tests:  CBC:   Recent Labs     03/22/22  2351 03/23/22  0519 03/23/22  1040   WBC 11.0 11.5* 12.5*   HGB 13.3* 13.6 13.3*    161 161     BMP:    Recent Labs     03/22/22  0540 03/22/22  1720 03/23/22  0519   * 140 132*   K 4.7 4.1 4.6   CL 99 105 98*   CO2 22 21 20*   BUN 10 8 8   CREATININE 0.8* 0.6* 0.8*   GLUCOSE 97 95 102*     Hepatic:   No results for input(s): AST, ALT, ALB, BILITOT, ALKPHOS in the last 72 hours. COVID Negative     CTA  Abd Aorta   Near complete occlusion of the left popliteal artery.  Flow to the left   foot is demonstrated via the posterior tibial artery.       2.  Occlusion of the left common iliac artery with reconstitution at the   level of the external iliac artery by the inferior epigastric.       3.  Findings of mild focal pancreatitis in the tail is demonstrated.       4.  Diverticulosis. Problem List  Principal Problem:    Ischemia of left lower extremity  Active Problems:    Left foot pain  Resolved Problems:    * No resolved hospital problems. *       Assessment & Plan:   1. Acute LLE arterial occlusion: Still has thrombolysis catheter. Going back to cath lab this afternoon. Appreciate input from vascular . He is on ASA and statin therap. 2. Leukocytosis:  Will check CXR and UA. 3. Elevated BP-dc fluids for now. BP had been well controlled up until yesterday afternoon. Hold off on scheduled antihypertensives.     4. Tobacco use:  Cessation encouraged       Diet: Diet NPO Exceptions are: Sips of Water with Meds  Code:Full Code  DVT PPX      Ronel Contreras PA-C   3/23/2022 12:34 PM

## 2022-03-23 NOTE — PROGRESS NOTES
ACT drawn and resulted 166. Patient instructed on removal procedure. Arterial sheath site without hematoma or oozing. Arterial sheath removed per policy without difficulty. Integrity of sheath inspected upon removal and no abnormalities noted. Manual pressure held X 25 minutes. Dry, sterile tegaderm applied. Pressure dressing applied. Patient tolerated well. Vital signs, groin checks, and pedal pulses will be completed per protocol (every 15 minutes X 4, every 30 minutes X 2, and every hour X 2 per protocol). Sheath removed by  Parth Gutierrez.

## 2022-03-23 NOTE — CONSENT
Pharmacy to check patient copays for Eliquis/Xarelto:    Copay for patient will be: $0/month      Pharmacy will continue to follow the decision for anticoagulation and  the patient if appropriate.        Daniel Gee, PharmD, [facility]  Clinical Pharmacist  [id number]

## 2022-03-23 NOTE — PROGRESS NOTES
Pt back from procedure with 2 OR nurses. Pt is alert and oriented, able to answer questions approporately and in full sentences. R groin site is soft and 0 s/s of hematoma. BP continues in the 150s/160s.

## 2022-03-24 VITALS
TEMPERATURE: 97.8 F | HEART RATE: 91 BPM | OXYGEN SATURATION: 91 % | SYSTOLIC BLOOD PRESSURE: 118 MMHG | HEIGHT: 67 IN | DIASTOLIC BLOOD PRESSURE: 79 MMHG | BODY MASS INDEX: 27.47 KG/M2 | WEIGHT: 175 LBS | RESPIRATION RATE: 16 BRPM

## 2022-03-24 LAB
ANION GAP SERPL CALCULATED.3IONS-SCNC: 12 MMOL/L (ref 3–16)
APTT: 41 SEC (ref 26.2–38.6)
BLOOD CULTURE, ROUTINE: NORMAL
BUN BLDV-MCNC: 9 MG/DL (ref 7–20)
CALCIUM SERPL-MCNC: 8.9 MG/DL (ref 8.3–10.6)
CHLORIDE BLD-SCNC: 100 MMOL/L (ref 99–110)
CO2: 24 MMOL/L (ref 21–32)
CREAT SERPL-MCNC: 0.7 MG/DL (ref 0.9–1.3)
GFR AFRICAN AMERICAN: >60
GFR NON-AFRICAN AMERICAN: >60
GLUCOSE BLD-MCNC: 118 MG/DL (ref 70–99)
HCT VFR BLD CALC: 39.1 % (ref 40.5–52.5)
HEMOGLOBIN: 13.3 G/DL (ref 13.5–17.5)
MCH RBC QN AUTO: 33.1 PG (ref 26–34)
MCHC RBC AUTO-ENTMCNC: 33.9 G/DL (ref 31–36)
MCV RBC AUTO: 97.8 FL (ref 80–100)
PDW BLD-RTO: 13.7 % (ref 12.4–15.4)
PLATELET # BLD: 159 K/UL (ref 135–450)
PMV BLD AUTO: 8.2 FL (ref 5–10.5)
POTASSIUM SERPL-SCNC: 4.6 MMOL/L (ref 3.5–5.1)
RBC # BLD: 4 M/UL (ref 4.2–5.9)
SODIUM BLD-SCNC: 136 MMOL/L (ref 136–145)
WBC # BLD: 11.5 K/UL (ref 4–11)

## 2022-03-24 PROCEDURE — 80048 BASIC METABOLIC PNL TOTAL CA: CPT

## 2022-03-24 PROCEDURE — 94761 N-INVAS EAR/PLS OXIMETRY MLT: CPT

## 2022-03-24 PROCEDURE — 2580000003 HC RX 258: Performed by: SURGERY

## 2022-03-24 PROCEDURE — 6370000000 HC RX 637 (ALT 250 FOR IP): Performed by: SURGERY

## 2022-03-24 PROCEDURE — APPSS30 APP SPLIT SHARED TIME 16-30 MINUTES: Performed by: NURSE PRACTITIONER

## 2022-03-24 PROCEDURE — 6360000002 HC RX W HCPCS: Performed by: NURSE PRACTITIONER

## 2022-03-24 PROCEDURE — 6370000000 HC RX 637 (ALT 250 FOR IP): Performed by: NURSE PRACTITIONER

## 2022-03-24 PROCEDURE — 94640 AIRWAY INHALATION TREATMENT: CPT

## 2022-03-24 PROCEDURE — 85730 THROMBOPLASTIN TIME PARTIAL: CPT

## 2022-03-24 PROCEDURE — APPNB30 APP NON BILLABLE TIME 0-30 MINS: Performed by: NURSE PRACTITIONER

## 2022-03-24 PROCEDURE — 85027 COMPLETE CBC AUTOMATED: CPT

## 2022-03-24 RX ORDER — CLOPIDOGREL BISULFATE 75 MG/1
75 TABLET ORAL DAILY
Status: DISCONTINUED | OUTPATIENT
Start: 2022-03-24 | End: 2022-03-24 | Stop reason: HOSPADM

## 2022-03-24 RX ORDER — CLOPIDOGREL BISULFATE 75 MG/1
75 TABLET ORAL DAILY
Qty: 30 TABLET | Refills: 3 | Status: SHIPPED | OUTPATIENT
Start: 2022-03-25

## 2022-03-24 RX ORDER — HYDROCODONE BITARTRATE AND ACETAMINOPHEN 5; 325 MG/1; MG/1
1 TABLET ORAL EVERY 6 HOURS PRN
Qty: 28 TABLET | Refills: 0 | Status: SHIPPED | OUTPATIENT
Start: 2022-03-24 | End: 2022-04-13 | Stop reason: SDUPTHER

## 2022-03-24 RX ADMIN — CLOPIDOGREL BISULFATE 75 MG: 75 TABLET ORAL at 08:55

## 2022-03-24 RX ADMIN — HYDROCODONE BITARTRATE AND ACETAMINOPHEN 1 TABLET: 5; 325 TABLET ORAL at 03:15

## 2022-03-24 RX ADMIN — RIVAROXABAN 15 MG: 15 TABLET, FILM COATED ORAL at 10:28

## 2022-03-24 RX ADMIN — IPRATROPIUM BROMIDE AND ALBUTEROL SULFATE 1 AMPULE: .5; 3 SOLUTION RESPIRATORY (INHALATION) at 08:09

## 2022-03-24 RX ADMIN — SODIUM CHLORIDE, PRESERVATIVE FREE 10 ML: 5 INJECTION INTRAVENOUS at 08:57

## 2022-03-24 RX ADMIN — Medication 2 PUFF: at 08:08

## 2022-03-24 RX ADMIN — CEFAZOLIN SODIUM 2000 MG: 10 INJECTION, POWDER, FOR SOLUTION INTRAVENOUS at 04:56

## 2022-03-24 ASSESSMENT — PAIN SCALES - GENERAL
PAINLEVEL_OUTOF10: 3
PAINLEVEL_OUTOF10: 7

## 2022-03-24 ASSESSMENT — PAIN DESCRIPTION - LOCATION: LOCATION: LEG

## 2022-03-24 ASSESSMENT — PAIN DESCRIPTION - PAIN TYPE: TYPE: ACUTE PAIN

## 2022-03-24 ASSESSMENT — PAIN DESCRIPTION - ORIENTATION: ORIENTATION: LEFT

## 2022-03-24 NOTE — PROGRESS NOTES
Vascular Progress Note    3/24/2022 8:27 AM    Chief complaint / Reason for visit : left leg ischemia     Subjective:  Patient resting in bed. He reports his left leg and foot pain is improved. He has no other complaints at this time. VSS, afebrile.      Vital Signs: /79   Pulse 91   Temp 98.5 °F (36.9 °C) (Temporal)   Resp 16   Ht 5' 7\" (1.702 m)   Wt 175 lb (79.4 kg)   SpO2 95%   BMI 27.41 kg/m²  O2 Flow Rate (L/min): 0 L/min   I/O:      Intake/Output Summary (Last 24 hours) at 3/24/2022 0827  Last data filed at 3/24/2022 0456  Gross per 24 hour   Intake 1310.6 ml   Output 3550 ml   Net -2239.4 ml       Physical Exam:   General: no apparent distress, appears stated age  Chest/Lungs: clear to auscultation bilaterally, no accessory muscle use  Cardiac:  regular rate and rhythm, S1, S2 normal, no murmur, click, rub or gallop  Abdomen:  abdomen is soft without significant tenderness, masses, organomegaly or guarding  Vascular: + strong left PT and DP signal, palpable right DP pulse  Extremities: left foot warm, motor and sensory intact, color improved, no cyanosis or mottling noted    Skin: right groin site soft no bleeding or hematoma     Labs:   Lab Results   Component Value Date     03/24/2022    K 4.6 03/24/2022    K 3.9 03/20/2022     03/24/2022    CO2 24 03/24/2022    BUN 9 03/24/2022    CREATININE 0.7 03/24/2022    GFRAA >60 03/24/2022    LABGLOM >60 03/24/2022    GLUCOSE 118 03/24/2022    CALCIUM 8.9 03/24/2022     Lab Results   Component Value Date    WBC 11.5 03/24/2022    RBC 4.00 03/24/2022    HGB 13.3 03/24/2022    HCT 39.1 03/24/2022    MCV 97.8 03/24/2022    RDW 13.7 03/24/2022     03/24/2022     Lab Results   Component Value Date    INR 1.17 (H) 03/18/2022    PROTIME 13.3 (H) 03/18/2022        Imaging:    CTA bilateral runoff 3/18/22:  Impression   1.  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  Flow to the left   foot is demonstrated via the posterior tibial artery.       2.  Occlusion of the left common iliac artery with reconstitution at the   level of the external iliac artery by the inferior epigastric.       3.  Findings of mild focal pancreatitis in the tail is demonstrated.       4.  Diverticulosis.             Scheduled Meds:    clopidogrel  75 mg Oral Daily    sodium chloride flush  5-40 mL IntraVENous 2 times per day    sodium chloride flush  5-40 mL IntraVENous 2 times per day    atorvastatin  40 mg Oral Nightly    budesonide-formoterol  2 puff Inhalation BID    ipratropium-albuterol  1 ampule Inhalation BID     Continuous Infusions:    sodium chloride      heparin (PORCINE) Infusion 19 Units/kg/hr (03/24/22 0627)    sodium chloride           Assessment:   Acute left leg ischemia s/p left leg angiogram with initiation of arterial thrombolysis + follow up angiogram + left PT thrombectomy and PTA - POD # 3/2/1  HLD on statin  HTN - improved -120s  Tobacco use - 1 PPD    Plan:  D/c ernst catheter  Increase activity  Tolerating regular diet   Start on Plavix 75 mg daily and statin therapy  Stop Heparin drip and switch to full dose Xarelto  Discussed importance of smoking cessation  Okay to discharge from vascular standpoint once able to void  Follow up with Dr. Maryjane Marshall on 4/13 at 9:15 am.  Call the office at [phone] with questions or concerns     Patient is stable from vascular standpoint. We will sign off for now, but please do not hesitate to contact us with any questions. Thank you for the consultation. Patient educated on plan of care and disease process. All questions answered.         Electronically signed by ORLANDO Lomax CNP on 3/24/2022 at 8:27 AM

## 2022-03-24 NOTE — PROGRESS NOTES
CLINICAL PHARMACY NOTE: MEDS TO BEDS    Total # of Prescriptions Filled: 3   The following medications were delivered to the patient:  · Norco 5-325 mg  · Xarelto 15 & 20  · Plavix 75 mg    Additional Documentation:    Delivered to Patient=signed  Trini Camacho CPhT

## 2022-03-24 NOTE — PROGRESS NOTES
Shift assessment complete; see flow sheet. Pt remains alert and oriented with no pain at this time. Pt remains supine; R groin puncture site soft w/ no hematoma. Doppler pulses on LLE  Pt has PIVx2, WNL  Dumas remains in place, WNL.  VSS. BP: 109/79 HR: 98 O2: 95%. Heparin gtt: 15units/kg/hr  Pt getting Ancef ABX    /64   Pulse 97   Temp 98.7 °F (37.1 °C) (Temporal)   Resp 22   Ht 5' 7\" (1.702 m)   Wt 174 lb 9.7 oz (79.2 kg)   SpO2 95%   BMI 27.35 kg/m²     0000: Re-assessment complete;see flow sheet. Groin remains soft, w/o signs of hematoma. No changes noted. Pt resting well. No needs at this time. 0400: Re-assessment completed; see flow sheet. No changes noted.     0630: Heparin changed from 15units/kg/hr to 19units/kg/hr d/t aPTT 41.0

## 2022-03-24 NOTE — PROGRESS NOTES
Xarelto Counseling Note    Andrea Aguirre was counseled on Xarelto for DVT/PE. Duration of therapy: 3-6 months depending on vascular recommendations    Indication, duration of therapy, and signs/symptoms related to bleeding were discussed. Andrea Aguirre had opportunity to ask questions. No barriers to education were noted.     [address] [place] Student

## 2022-03-24 NOTE — CARE COORDINATION
Patient discharged 3/24/2022 to home. Patient states he has a ride coming. All discharge needs met per case management.     MANUELA López, CCM, RN  [facility]  [phone]

## 2022-04-04 NOTE — DISCHARGE SUMMARY
of the abdomen and lower extremities which revealed occlusion of left common iliac artery with reconstitution at the level of the external iliac artery as well as near complete occlusion left popliteal artery. Patient was admitted with an ischemic left foot. He was started on IV heparin and seen by vascular surgery. He was taken to the Cath Lab on 3/21 by Dr. Janice Shin where he underwent an angiogram of the lower extremity with initiation of arterial thrombolysis. Intraoperatively he was found to have thrombus in the left common iliac, external iliac,distal popliteal, and tibial thrombus. He was monitored in the CVU and was taken back to the cath lab on 3/22 where he was found to have some residual thrombus. Arterial lysis was continued. He was taken back to the Cath Lab on March 23 1 final time where he underwent left posterior tibial thrombectomy and angioplasty. 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 Peroneal artery was diminutive. After angioplasty the posterior tibial artery is patent. He was started on aspirin, Plavix, and Eliquis and was discharged home in stable condition. Consults. IP CONSULT TO VASCULAR SURGERY  IP CONSULT TO VASCULAR SURGERY  IP CONSULT TO PHARMACY    Physical examination on discharge day. /79   Pulse 91   Temp 97.8 °F (36.6 °C) (Temporal)   Resp 16   Ht 5' 7\" (1.702 m)   Wt 175 lb (79.4 kg)   SpO2 91%   BMI 27.41 kg/m²   General appearance. Alert. Looks comfortable. HEENT. Sclera clear. Moist mucus membranes. Cardiovascular. Regular rate and rhythm, normal S1, S2. No murmur. Respiratory. Not using accessory muscles. Clear to auscultation bilaterally, no wheeze. Gastrointestinal. Abdomen soft, non-tender, not distended, normal bowel sounds  Neurology. Facial symmetry. No speech deficits.  Moving all Medications      These medications were sent to [facility], [address], [address]    Phone: [phone]   · clopidogrel 75 MG tablet  · HYDROcodone-acetaminophen 5-325 MG per tablet  · rivaroxaban 15 & 20 MG Starter Pack         Discharge recommendations given to patient. Follow Up. Dr Chicho Oh on 4/13. PCP in 1 week   Disposition. home  Activity. activity as tolerated  Diet: No diet orders on file      Spent 35 minutes in discharge process. Signed:   Hayley Miller PA-C     4/4/2022 11:30 AM

## 2022-04-13 ENCOUNTER — OFFICE VISIT (OUTPATIENT)
Dept: VASCULAR SURGERY | Age: 52
End: 2022-04-13

## 2022-04-13 VITALS
DIASTOLIC BLOOD PRESSURE: 80 MMHG | SYSTOLIC BLOOD PRESSURE: 128 MMHG | WEIGHT: 182.6 LBS | HEIGHT: 67 IN | BODY MASS INDEX: 28.66 KG/M2

## 2022-04-13 DIAGNOSIS — I99.8 ISCHEMIA OF LEFT LOWER EXTREMITY: ICD-10-CM

## 2022-04-13 DIAGNOSIS — Z09 POSTOPERATIVE EXAMINATION: Primary | ICD-10-CM

## 2022-04-13 PROCEDURE — 99024 POSTOP FOLLOW-UP VISIT: CPT | Performed by: SURGERY

## 2022-04-13 RX ORDER — HYDROCODONE BITARTRATE AND ACETAMINOPHEN 5; 325 MG/1; MG/1
1 TABLET ORAL EVERY 6 HOURS PRN
Qty: 28 TABLET | Refills: 0 | Status: SHIPPED | OUTPATIENT
Start: 2022-04-13 | End: 2022-04-20

## 2022-04-13 NOTE — PROGRESS NOTES
Postop Progress Note    Subjective    Sumeet Robles presents to the office for postop follow up. Patient underwent thrombolysis of the left lower extremity and is here today for his first post procedure visit. He complains of some intermittent swelling and discomfort still in his left foot but overall significantly better. No other complaints today    Objective    Vitals:    04/13/22 0935   BP: 128/80     General: alert, cooperative and no distress  Trace edema left foot. Easily palpable left posterior tibial pulse    Assessment  Doing well postoperatively. Plan  Would recommend light weight compression for swelling in the left leg. A refill of his pain medication and Xarelto was given today.   Follow-up 3 months for surveillance duplex imaging    Electronically signed by Renita Silva MD on 4/13/2022 at 9:42 AM

## 2022-04-19 DIAGNOSIS — I99.8 ISCHEMIA OF LEFT LOWER EXTREMITY: Primary | ICD-10-CM

## 2022-04-19 RX ORDER — CLOPIDOGREL BISULFATE 75 MG/1
75 TABLET ORAL DAILY
Qty: 30 TABLET | Refills: 5 | Status: SHIPPED | OUTPATIENT
Start: 2022-04-19 | End: 2022-10-19

## 2022-06-27 DIAGNOSIS — S89.92XA INJURY OF LEFT LOWER EXTREMITY, INITIAL ENCOUNTER: ICD-10-CM

## 2022-06-27 DIAGNOSIS — L40.9 PSORIASIS: ICD-10-CM

## 2022-06-28 RX ORDER — METHYLPREDNISOLONE 4 MG/1
TABLET ORAL
Qty: 21 TABLET | OUTPATIENT
Start: 2022-06-28

## 2022-06-28 RX ORDER — TRIAMCINOLONE ACETONIDE 1 MG/G
CREAM TOPICAL
Qty: 454 G | Refills: 1 | Status: SHIPPED | OUTPATIENT
Start: 2022-06-28 | End: 2022-08-22

## 2022-06-28 NOTE — TELEPHONE ENCOUNTER
Recent Visits  Date Type Provider Dept   03/18/22 Office Visit Yakelin Power APRN - CNP [facility]   03/11/22 Office Visit Yakelin Power APRN - CNP [facility]   03/03/22 Office Visit Yakelin Power APRN - CNP [facility]   01/20/22 Office Visit Yakelin Power APRN - CNP [facility]   07/08/21 Office Visit Yakelin Power APRN - CNP [facility] recent visits within past 540 days with a meds authorizing provider and meeting all other requirements  Future Appointments  Date Type Provider Dept   07/21/22 Appointment ORLANDO Stevens - CNP [facility]   Showing future appointments within next 150 days with a meds authorizing provider and meeting all other requirements     3/18/2022

## 2022-07-18 ENCOUNTER — PROCEDURE VISIT (OUTPATIENT)
Dept: VASCULAR SURGERY | Age: 52
End: 2022-07-18

## 2022-07-18 DIAGNOSIS — I99.8 ISCHEMIA OF LEFT LOWER EXTREMITY: ICD-10-CM

## 2022-07-20 DIAGNOSIS — S89.92XA INJURY OF LEFT LOWER EXTREMITY, INITIAL ENCOUNTER: ICD-10-CM

## 2022-07-22 RX ORDER — METHYLPREDNISOLONE 4 MG/1
TABLET ORAL
Qty: 21 TABLET | OUTPATIENT
Start: 2022-07-22

## 2022-07-27 ENCOUNTER — TELEPHONE (OUTPATIENT)
Dept: VASCULAR SURGERY | Age: 52
End: 2022-07-27

## 2022-07-27 DIAGNOSIS — I70.212 ATHEROSCLEROSIS OF NATIVE ARTERIES OF EXTREMITIES WITH INTERMITTENT CLAUDICATION, LEFT LEG (HCC): Primary | ICD-10-CM

## 2022-07-27 NOTE — TELEPHONE ENCOUNTER
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 Patient reports he is doing well. Would recommend repeat LLE arterial duplex in 3 months for continued surveillance. Call sooner if issues arise.     Electronically signed by ORLANDO Overton CNP on 7/27/2022 at 8:40 AM

## 2022-08-20 DIAGNOSIS — S89.92XA INJURY OF LEFT LOWER EXTREMITY, INITIAL ENCOUNTER: ICD-10-CM

## 2022-08-20 DIAGNOSIS — L40.9 PSORIASIS: ICD-10-CM

## 2022-08-22 RX ORDER — TRIAMCINOLONE ACETONIDE 1 MG/G
CREAM TOPICAL
Qty: 454 G | Refills: 1 | Status: SHIPPED | OUTPATIENT
Start: 2022-08-22

## 2022-08-22 RX ORDER — METHYLPREDNISOLONE 4 MG/1
TABLET ORAL
Qty: 21 TABLET | OUTPATIENT
Start: 2022-08-22

## 2022-08-23 DIAGNOSIS — S89.92XA INJURY OF LEFT LOWER EXTREMITY, INITIAL ENCOUNTER: ICD-10-CM

## 2022-08-23 RX ORDER — METHYLPREDNISOLONE 4 MG/1
TABLET ORAL
Qty: 21 TABLET | OUTPATIENT
Start: 2022-08-23

## 2022-10-19 DIAGNOSIS — I99.8 ISCHEMIA OF LEFT LOWER EXTREMITY: ICD-10-CM

## 2022-10-19 RX ORDER — CLOPIDOGREL BISULFATE 75 MG/1
TABLET ORAL
Qty: 30 TABLET | Refills: 5 | Status: SHIPPED | OUTPATIENT
Start: 2022-10-19 | End: 2022-12-15

## 2022-10-19 RX ORDER — RIVAROXABAN 20 MG/1
TABLET, FILM COATED ORAL
Qty: 30 TABLET | Refills: 5 | Status: SHIPPED | OUTPATIENT
Start: 2022-10-19

## 2022-11-07 ENCOUNTER — OFFICE VISIT (OUTPATIENT)
Dept: INTERNAL MEDICINE CLINIC | Age: 52
End: 2022-11-07
Payer: COMMERCIAL

## 2022-11-07 VITALS
WEIGHT: 184.8 LBS | DIASTOLIC BLOOD PRESSURE: 80 MMHG | HEART RATE: 90 BPM | SYSTOLIC BLOOD PRESSURE: 138 MMHG | BODY MASS INDEX: 29 KG/M2 | OXYGEN SATURATION: 97 % | TEMPERATURE: 96.8 F | HEIGHT: 67 IN

## 2022-11-07 DIAGNOSIS — E55.9 VITAMIN D DEFICIENCY: ICD-10-CM

## 2022-11-07 DIAGNOSIS — Z23 NEED FOR INFLUENZA VACCINATION: ICD-10-CM

## 2022-11-07 DIAGNOSIS — J44.9 CHRONIC OBSTRUCTIVE PULMONARY DISEASE, UNSPECIFIED COPD TYPE (HCC): ICD-10-CM

## 2022-11-07 DIAGNOSIS — Z00.00 ANNUAL PHYSICAL EXAM: Primary | ICD-10-CM

## 2022-11-07 DIAGNOSIS — Z12.11 COLON CANCER SCREENING: ICD-10-CM

## 2022-11-07 DIAGNOSIS — R73.01 IMPAIRED FASTING GLUCOSE: ICD-10-CM

## 2022-11-07 DIAGNOSIS — Z00.00 ANNUAL PHYSICAL EXAM: ICD-10-CM

## 2022-11-07 DIAGNOSIS — I99.8 ISCHEMIA OF LEFT LOWER EXTREMITY: ICD-10-CM

## 2022-11-07 DIAGNOSIS — E78.5 DYSLIPIDEMIA: ICD-10-CM

## 2022-11-07 LAB
BASOPHILS ABSOLUTE: 0.1 K/UL (ref 0–0.2)
BASOPHILS RELATIVE PERCENT: 0.9 %
CHOLESTEROL, TOTAL: 215 MG/DL (ref 0–199)
EOSINOPHILS ABSOLUTE: 0.2 K/UL (ref 0–0.6)
EOSINOPHILS RELATIVE PERCENT: 2.9 %
HCT VFR BLD CALC: 45.6 % (ref 40.5–52.5)
HDLC SERPL-MCNC: 42 MG/DL (ref 40–60)
HEMOGLOBIN: 15.8 G/DL (ref 13.5–17.5)
LDL CHOLESTEROL CALCULATED: 144 MG/DL
LYMPHOCYTES ABSOLUTE: 1.8 K/UL (ref 1–5.1)
LYMPHOCYTES RELATIVE PERCENT: 22.9 %
MCH RBC QN AUTO: 32.2 PG (ref 26–34)
MCHC RBC AUTO-ENTMCNC: 34.6 G/DL (ref 31–36)
MCV RBC AUTO: 93.1 FL (ref 80–100)
MONOCYTES ABSOLUTE: 0.8 K/UL (ref 0–1.3)
MONOCYTES RELATIVE PERCENT: 10.2 %
NEUTROPHILS ABSOLUTE: 4.9 K/UL (ref 1.7–7.7)
NEUTROPHILS RELATIVE PERCENT: 63.1 %
PDW BLD-RTO: 14.3 % (ref 12.4–15.4)
PLATELET # BLD: 318 K/UL (ref 135–450)
PMV BLD AUTO: 7.7 FL (ref 5–10.5)
RBC # BLD: 4.9 M/UL (ref 4.2–5.9)
TRIGL SERPL-MCNC: 144 MG/DL (ref 0–150)
VLDLC SERPL CALC-MCNC: 29 MG/DL
WBC # BLD: 7.7 K/UL (ref 4–11)

## 2022-11-07 PROCEDURE — 99396 PREV VISIT EST AGE 40-64: CPT | Performed by: NURSE PRACTITIONER

## 2022-11-07 PROCEDURE — 90674 CCIIV4 VAC NO PRSV 0.5 ML IM: CPT | Performed by: NURSE PRACTITIONER

## 2022-11-07 PROCEDURE — 90471 IMMUNIZATION ADMIN: CPT | Performed by: NURSE PRACTITIONER

## 2022-11-07 PROCEDURE — G8482 FLU IMMUNIZE ORDER/ADMIN: HCPCS | Performed by: NURSE PRACTITIONER

## 2022-11-07 SDOH — ECONOMIC STABILITY: FOOD INSECURITY: WITHIN THE PAST 12 MONTHS, THE FOOD YOU BOUGHT JUST DIDN'T LAST AND YOU DIDN'T HAVE MONEY TO GET MORE.: NEVER TRUE

## 2022-11-07 SDOH — ECONOMIC STABILITY: FOOD INSECURITY: WITHIN THE PAST 12 MONTHS, YOU WORRIED THAT YOUR FOOD WOULD RUN OUT BEFORE YOU GOT MONEY TO BUY MORE.: NEVER TRUE

## 2022-11-07 ASSESSMENT — PATIENT HEALTH QUESTIONNAIRE - PHQ9
SUM OF ALL RESPONSES TO PHQ QUESTIONS 1-9: 0
SUM OF ALL RESPONSES TO PHQ QUESTIONS 1-9: 0
2. FEELING DOWN, DEPRESSED OR HOPELESS: 0
SUM OF ALL RESPONSES TO PHQ QUESTIONS 1-9: 0
SUM OF ALL RESPONSES TO PHQ QUESTIONS 1-9: 0
SUM OF ALL RESPONSES TO PHQ9 QUESTIONS 1 & 2: 0
1. LITTLE INTEREST OR PLEASURE IN DOING THINGS: 0

## 2022-11-07 ASSESSMENT — SOCIAL DETERMINANTS OF HEALTH (SDOH): HOW HARD IS IT FOR YOU TO PAY FOR THE VERY BASICS LIKE FOOD, HOUSING, MEDICAL CARE, AND HEATING?: NOT HARD AT ALL

## 2022-11-08 ENCOUNTER — PROCEDURE VISIT (OUTPATIENT)
Dept: VASCULAR SURGERY | Age: 52
End: 2022-11-08

## 2022-11-08 DIAGNOSIS — I70.212 ATHEROSCLEROSIS OF NATIVE ARTERIES OF EXTREMITIES WITH INTERMITTENT CLAUDICATION, LEFT LEG (HCC): ICD-10-CM

## 2022-11-08 DIAGNOSIS — E78.5 DYSLIPIDEMIA: ICD-10-CM

## 2022-11-08 LAB — VITAMIN D 25-HYDROXY: 24 NG/ML

## 2022-11-08 RX ORDER — ATORVASTATIN CALCIUM 80 MG/1
80 TABLET, FILM COATED ORAL NIGHTLY
Qty: 30 TABLET | Refills: 11 | Status: SHIPPED | OUTPATIENT
Start: 2022-11-08

## 2022-11-09 ENCOUNTER — TELEPHONE (OUTPATIENT)
Dept: VASCULAR SURGERY | Age: 52
End: 2022-11-09

## 2022-11-09 DIAGNOSIS — I70.212 ATHEROSCLEROSIS OF NATIVE ARTERIES OF EXTREMITIES WITH INTERMITTENT CLAUDICATION, LEFT LEG (HCC): Primary | ICD-10-CM

## 2022-11-09 NOTE — TELEPHONE ENCOUNTER
Left message with results of LLE arterial duplex which is stable with left LY 0.95 and patent left PT artery. Plan to repeat LLE arterial duplex in 6 months. Instructed patient to call if any worsening symptoms or wounds.     Electronically signed by ORLANDO Royal CNP on 11/9/2022 at 12:27 PM

## 2022-11-17 ASSESSMENT — ENCOUNTER SYMPTOMS
RESPIRATORY NEGATIVE: 1
GASTROINTESTINAL NEGATIVE: 1

## 2022-11-17 NOTE — PROGRESS NOTES
SUBJECTIVE:    Patient ID: Hang Ortega is a 45 y.o. male. CC: Annual physical exam, COPD/asthma, psoriasis, vit D def    HPI: The patient presents to the office today for annual physical and follow-up of medical conditions. He has history of COPR/asthma. He denies any recent exacerbation. He is using Breo and albuterol. History of dyslipidemia. No chest pain. He is on statin. Past Medical History:   Diagnosis Date    Asthma     COPD (chronic obstructive pulmonary disease) ([facility])     Hyperlipidemia         Current Outpatient Medications   Medication Sig Dispense Refill    XARELTO 20 MG TABS tablet TAKE ONE TABLET BY MOUTH DAILY WITH BREAKFAST 30 tablet 5    clopidogrel (PLAVIX) 75 MG tablet TAKE ONE TABLET BY MOUTH DAILY 30 tablet 5    triamcinolone (KENALOG) 0.1 % cream APPLY TO AFFECTED AREA(S) TWO TIMES A  g 1    clopidogrel (PLAVIX) 75 MG tablet Take 1 tablet by mouth daily 30 tablet 3    rivaroxaban 15 & 20 MG Starter Pack Take as directed on package. 1 each 0    Fluticasone furoate-vilanterol (BREO ELLIPTA) 200-25 MCG/INH AEPB inhaler Inhale 1 puff into the lungs daily 28 each 11    albuterol sulfate  (90 Base) MCG/ACT inhaler Inhale 2 puffs into the lungs every 4 hours as needed for Wheezing or Shortness of Breath 8.5 Inhaler 5    atorvastatin (LIPITOR) 80 MG tablet Take 1 tablet by mouth at bedtime 30 tablet 11     No current facility-administered medications for this visit. Review of Systems   Constitutional: Negative. Respiratory: Negative. Cardiovascular: Negative. Gastrointestinal: Negative. Genitourinary: Negative. Musculoskeletal: Negative. Skin:  Positive for rash (psoriasis improving). Neurological: Negative. All other systems reviewed and are negative. OBJECTIVE:  Physical Exam  Vitals reviewed. Constitutional:       General: He is not in acute distress. Appearance: He is well-developed. He is not diaphoretic. HENT:      Head: Normocephalic and atraumatic. Eyes:      General: No scleral icterus. Conjunctiva/sclera: Conjunctivae normal.   Neck:      Vascular: No JVD. Cardiovascular:      Rate and Rhythm: Normal rate and regular rhythm. Pulmonary:      Effort: Pulmonary effort is normal. No respiratory distress. Breath sounds: Normal breath sounds. No wheezing or rales. Abdominal:      General: There is no distension. Palpations: Abdomen is soft. Tenderness: There is no abdominal tenderness. There is no guarding or rebound. Musculoskeletal:         General: Normal range of motion. Cervical back: Neck supple. Skin:     General: Skin is warm and dry. Neurological:      Mental Status: He is alert and oriented to person, place, and time. Psychiatric:         Behavior: Behavior normal.         Thought Content: Thought content normal.      /80   Pulse 90   Temp 96.8 °F (36 °C)   Ht 5' 7\" (1.702 m)   Wt 184 lb 12.8 oz (83.8 kg)   SpO2 97%   BMI 28.94 kg/m²      PHQ Scores 11/7/2022 7/8/2021 1/13/2020 5/7/2019   PHQ2 Score 0 0 0 0   PHQ9 Score 0 0 0 0     Interpretation of Total Score Depression Severity: 1-4 = Minimal depression, 5-9 = Mild depression, 10-14 = Moderate depression, 15-19 = Moderately severe depression, 20-27 =Severe depression        ASSESSMENT/PLAN:  Mariola Lloyd was seen today for 6 month follow-up. Diagnoses and all orders for this visit:    Annual physical exam  -     LIPID PANEL;  Future  -     CBC with Auto Differential; Future  -     Cancel: COLONOSCOPY (Screening)  -     Fecal DNA Colorectal cancer screening (Cologuard)    Ischemia of left lower extremity  - In March patient was found to have ischemia of left foot  - Admitted, seen by vascular and had angiogram, thrombectomy, angioplasty  - He is on ASA, Plavix, and Xarelto  - He is followed by vascular surgeon    Chronic obstructive pulmonary disease, unspecified COPD type ([facility] [address]  - Chronic, stable  - Continue current regimen  -     LIPID PANEL; Future  -     CBC with Auto Differential; Future    Dyslipidemia  - Chronic, stable  - Continue current regimen    Impaired fasting glucose  - Chronic, stable  - Continue current regimen    Vitamin D deficiency  -     Vitamin D 25 Hydroxy;  Future    Colon cancer screening  -     Cancel: COLONOSCOPY (Screening)  -     Fecal DNA Colorectal cancer screening (Cologuard)    Need for influenza vaccination  -     Influenza, FLUCELVAX, (age 10 mo+), IM, Preservative Free, 0.5 mL      ORLANDO Xiao - CNP

## 2022-12-15 ENCOUNTER — APPOINTMENT (OUTPATIENT)
Dept: CT IMAGING | Age: 52
End: 2022-12-15
Payer: COMMERCIAL

## 2022-12-15 ENCOUNTER — APPOINTMENT (OUTPATIENT)
Dept: MRI IMAGING | Age: 52
End: 2022-12-15
Payer: COMMERCIAL

## 2022-12-15 ENCOUNTER — APPOINTMENT (OUTPATIENT)
Dept: GENERAL RADIOLOGY | Age: 52
End: 2022-12-15
Payer: COMMERCIAL

## 2022-12-15 ENCOUNTER — HOSPITAL ENCOUNTER (OUTPATIENT)
Age: 52
Setting detail: OBSERVATION
Discharge: HOME | End: 2022-12-15
Attending: EMERGENCY MEDICINE | Admitting: INTERNAL MEDICINE
Payer: COMMERCIAL

## 2022-12-15 VITALS
RESPIRATION RATE: 18 BRPM | SYSTOLIC BLOOD PRESSURE: 103 MMHG | HEIGHT: 67 IN | BODY MASS INDEX: 28.56 KG/M2 | WEIGHT: 182 LBS | TEMPERATURE: 97.3 F | HEART RATE: 65 BPM | DIASTOLIC BLOOD PRESSURE: 64 MMHG | OXYGEN SATURATION: 93 %

## 2022-12-15 DIAGNOSIS — R07.9 CHEST PAIN, UNSPECIFIED TYPE: Primary | ICD-10-CM

## 2022-12-15 DIAGNOSIS — R42 DIZZINESS: ICD-10-CM

## 2022-12-15 PROBLEM — H81.13 BENIGN PAROXYSMAL POSITIONAL VERTIGO DUE TO BILATERAL VESTIBULAR DISORDER: Status: ACTIVE | Noted: 2022-12-15

## 2022-12-15 LAB
A/G RATIO: 1.5 (ref 1.1–2.2)
ALBUMIN SERPL-MCNC: 4 G/DL (ref 3.4–5)
ALP BLD-CCNC: 88 U/L (ref 40–129)
ALT SERPL-CCNC: 22 U/L (ref 10–40)
ANION GAP SERPL CALCULATED.3IONS-SCNC: 8 MMOL/L (ref 3–16)
AST SERPL-CCNC: 23 U/L (ref 15–37)
BASOPHILS ABSOLUTE: 0.4 K/UL (ref 0–0.2)
BASOPHILS RELATIVE PERCENT: 5 %
BILIRUB SERPL-MCNC: 0.3 MG/DL (ref 0–1)
BUN BLDV-MCNC: 6 MG/DL (ref 7–20)
CALCIUM SERPL-MCNC: 9.2 MG/DL (ref 8.3–10.6)
CHLORIDE BLD-SCNC: 102 MMOL/L (ref 99–110)
CO2: 27 MMOL/L (ref 21–32)
CREAT SERPL-MCNC: 1.1 MG/DL (ref 0.9–1.3)
EKG ATRIAL RATE: 77 BPM
EKG DIAGNOSIS: NORMAL
EKG P AXIS: -8 DEGREES
EKG P-R INTERVAL: 148 MS
EKG Q-T INTERVAL: 372 MS
EKG QRS DURATION: 80 MS
EKG QTC CALCULATION (BAZETT): 420 MS
EKG R AXIS: 14 DEGREES
EKG T AXIS: 82 DEGREES
EKG VENTRICULAR RATE: 77 BPM
EOSINOPHILS ABSOLUTE: 0.2 K/UL (ref 0–0.6)
EOSINOPHILS RELATIVE PERCENT: 1.9 %
GFR SERPL CREATININE-BSD FRML MDRD: >60 ML/MIN/{1.73_M2}
GLUCOSE BLD-MCNC: 117 MG/DL (ref 70–99)
HCT VFR BLD CALC: 44.4 % (ref 40.5–52.5)
HEMOGLOBIN: 14.8 G/DL (ref 13.5–17.5)
LYMPHOCYTES ABSOLUTE: 1.1 K/UL (ref 1–5.1)
LYMPHOCYTES RELATIVE PERCENT: 13.2 %
MCH RBC QN AUTO: 32.1 PG (ref 26–34)
MCHC RBC AUTO-ENTMCNC: 33.4 G/DL (ref 31–36)
MCV RBC AUTO: 96.1 FL (ref 80–100)
MONOCYTES ABSOLUTE: 1 K/UL (ref 0–1.3)
MONOCYTES RELATIVE PERCENT: 11.9 %
NEUTROPHILS ABSOLUTE: 5.6 K/UL (ref 1.7–7.7)
NEUTROPHILS RELATIVE PERCENT: 68 %
PDW BLD-RTO: 13.8 % (ref 12.4–15.4)
PLATELET # BLD: 339 K/UL (ref 135–450)
PMV BLD AUTO: 7.3 FL (ref 5–10.5)
POTASSIUM SERPL-SCNC: 4.3 MMOL/L (ref 3.5–5.1)
PRO-BNP: 212 PG/ML (ref 0–124)
RBC # BLD: 4.62 M/UL (ref 4.2–5.9)
SODIUM BLD-SCNC: 137 MMOL/L (ref 136–145)
TOTAL PROTEIN: 6.6 G/DL (ref 6.4–8.2)
TROPONIN: <0.01 NG/ML
WBC # BLD: 8.3 K/UL (ref 4–11)

## 2022-12-15 PROCEDURE — 99285 EMERGENCY DEPT VISIT HI MDM: CPT

## 2022-12-15 PROCEDURE — 70450 CT HEAD/BRAIN W/O DYE: CPT

## 2022-12-15 PROCEDURE — 93880 EXTRACRANIAL BILAT STUDY: CPT

## 2022-12-15 PROCEDURE — G0378 HOSPITAL OBSERVATION PER HR: HCPCS

## 2022-12-15 PROCEDURE — 83880 ASSAY OF NATRIURETIC PEPTIDE: CPT

## 2022-12-15 PROCEDURE — 85025 COMPLETE CBC W/AUTO DIFF WBC: CPT

## 2022-12-15 PROCEDURE — 36415 COLL VENOUS BLD VENIPUNCTURE: CPT

## 2022-12-15 PROCEDURE — 93005 ELECTROCARDIOGRAM TRACING: CPT | Performed by: NURSE PRACTITIONER

## 2022-12-15 PROCEDURE — 6370000000 HC RX 637 (ALT 250 FOR IP): Performed by: INTERNAL MEDICINE

## 2022-12-15 PROCEDURE — 84484 ASSAY OF TROPONIN QUANT: CPT

## 2022-12-15 PROCEDURE — 99244 OFF/OP CNSLTJ NEW/EST MOD 40: CPT | Performed by: PSYCHIATRY & NEUROLOGY

## 2022-12-15 PROCEDURE — 71045 X-RAY EXAM CHEST 1 VIEW: CPT

## 2022-12-15 PROCEDURE — 70551 MRI BRAIN STEM W/O DYE: CPT

## 2022-12-15 PROCEDURE — 80053 COMPREHEN METABOLIC PANEL: CPT

## 2022-12-15 PROCEDURE — 2580000003 HC RX 258: Performed by: INTERNAL MEDICINE

## 2022-12-15 PROCEDURE — 93010 ELECTROCARDIOGRAM REPORT: CPT | Performed by: INTERNAL MEDICINE

## 2022-12-15 RX ORDER — SODIUM CHLORIDE 0.9 % (FLUSH) 0.9 %
5-40 SYRINGE (ML) INJECTION EVERY 12 HOURS SCHEDULED
Status: DISCONTINUED | OUTPATIENT
Start: 2022-12-15 | End: 2022-12-15 | Stop reason: HOSPADM

## 2022-12-15 RX ORDER — ACETAMINOPHEN 325 MG/1
650 TABLET ORAL EVERY 6 HOURS PRN
Status: DISCONTINUED | OUTPATIENT
Start: 2022-12-15 | End: 2022-12-15 | Stop reason: HOSPADM

## 2022-12-15 RX ORDER — ACETAMINOPHEN 650 MG/1
650 SUPPOSITORY RECTAL EVERY 6 HOURS PRN
Status: DISCONTINUED | OUTPATIENT
Start: 2022-12-15 | End: 2022-12-15 | Stop reason: HOSPADM

## 2022-12-15 RX ORDER — SODIUM CHLORIDE 9 MG/ML
INJECTION, SOLUTION INTRAVENOUS PRN
Status: DISCONTINUED | OUTPATIENT
Start: 2022-12-15 | End: 2022-12-15 | Stop reason: HOSPADM

## 2022-12-15 RX ORDER — ONDANSETRON 2 MG/ML
4 INJECTION INTRAMUSCULAR; INTRAVENOUS EVERY 6 HOURS PRN
Status: DISCONTINUED | OUTPATIENT
Start: 2022-12-15 | End: 2022-12-15 | Stop reason: HOSPADM

## 2022-12-15 RX ORDER — ATORVASTATIN CALCIUM 80 MG/1
80 TABLET, FILM COATED ORAL NIGHTLY
Status: DISCONTINUED | OUTPATIENT
Start: 2022-12-15 | End: 2022-12-15 | Stop reason: HOSPADM

## 2022-12-15 RX ORDER — MECLIZINE HCL 12.5 MG/1
12.5 TABLET ORAL 3 TIMES DAILY PRN
Qty: 30 TABLET | Refills: 0 | Status: SHIPPED | OUTPATIENT
Start: 2022-12-15 | End: 2022-12-25

## 2022-12-15 RX ORDER — CLOPIDOGREL BISULFATE 75 MG/1
75 TABLET ORAL DAILY
Status: DISCONTINUED | OUTPATIENT
Start: 2022-12-15 | End: 2022-12-15 | Stop reason: HOSPADM

## 2022-12-15 RX ORDER — ONDANSETRON 4 MG/1
4 TABLET, ORALLY DISINTEGRATING ORAL EVERY 8 HOURS PRN
Status: DISCONTINUED | OUTPATIENT
Start: 2022-12-15 | End: 2022-12-15 | Stop reason: HOSPADM

## 2022-12-15 RX ORDER — SODIUM CHLORIDE 0.9 % (FLUSH) 0.9 %
5-40 SYRINGE (ML) INJECTION PRN
Status: DISCONTINUED | OUTPATIENT
Start: 2022-12-15 | End: 2022-12-15 | Stop reason: HOSPADM

## 2022-12-15 RX ORDER — FLUTICASONE FUROATE AND VILANTEROL 200; 25 UG/1; UG/1
1 POWDER RESPIRATORY (INHALATION) DAILY
Status: DISCONTINUED | OUTPATIENT
Start: 2022-12-15 | End: 2022-12-15 | Stop reason: RX

## 2022-12-15 RX ORDER — POLYETHYLENE GLYCOL 3350 17 G/17G
17 POWDER, FOR SOLUTION ORAL DAILY PRN
Status: DISCONTINUED | OUTPATIENT
Start: 2022-12-15 | End: 2022-12-15 | Stop reason: HOSPADM

## 2022-12-15 RX ADMIN — SODIUM CHLORIDE, PRESERVATIVE FREE 10 ML: 5 INJECTION INTRAVENOUS at 12:53

## 2022-12-15 RX ADMIN — CLOPIDOGREL BISULFATE 75 MG: 75 TABLET ORAL at 12:53

## 2022-12-15 ASSESSMENT — ENCOUNTER SYMPTOMS
SHORTNESS OF BREATH: 0
VOMITING: 0
ABDOMINAL PAIN: 0
CHEST TIGHTNESS: 0
NAUSEA: 0
DIARRHEA: 0

## 2022-12-15 ASSESSMENT — PAIN SCALES - GENERAL: PAINLEVEL_OUTOF10: 0

## 2022-12-15 NOTE — CARE COORDINATION
Case Management Note:    Patient admitted as Observation with an anticipated short hospitalization length of stay. Chart reviewed and it appears that patient has minimal needs for discharge at this time. Medical staff to inform case management team if discharge needs are identified. Case management will continue to follow progress and update discharge plan as needed.       Electronically signed by AKASH Mock on 12/15/2022 at 11:56 AM

## 2022-12-15 NOTE — H&P
Hospital Medicine History & Physical      PCP: ORLANDO Crespo CNP    Date of Admission: 12/15/2022    Date of Service: Pt seen/examined on 12/15/2022  and Placed in Observation. Chief Complaint:    Chief Complaint   Patient presents with    Gastroesophageal Reflux     Pt vi EMS from home, states he has been having increased heartburn for 4 days, now has heaviness in arms. Is on blood thinner for hx of stents and clots in legs. Dizziness     States he gets dizzy when he sits up too fast        History Of Present Illness: The patient is a 45 y.o. male with history of COPD/asthma, hyperlipidemia, peripheral vascular disease with previous left leg ischemia normal on chronic anticoagulation who presented with 4-day history of positional dizziness. Patient reports feeling dizzy when getting up from bed and more pronounced if he gets up fast.  Symptoms christin thereafter eldercare in the evening when he is to lie down. No significant symptoms with turning head to the sides. He denies any flulike symptoms or viral prodrome. No sore throat. No headaches. In the ER CT brain was unrevealing of acute pathology      Past Medical History:        Diagnosis Date    Asthma     COPD (chronic obstructive pulmonary disease) ([facility] [address]     Hyperlipidemia        Past Surgical History:    No past surgical history on file. Medications Prior to Admission:    Prior to Admission medications    Medication Sig Start Date End Date Taking?  Authorizing Provider   atorvastatin (LIPITOR) 80 MG tablet Take 1 tablet by mouth at bedtime 11/8/22   ORLANDO Crespo CNP   XARELTO 20 MG TABS tablet TAKE ONE TABLET BY MOUTH DAILY WITH BREAKFAST 10/19/22   Daniela ShaniceORLANDO CNP   clopidogrel (PLAVIX) 75 MG tablet Take 1 tablet by mouth daily 3/25/22   Daniela Shanice, APRN - CNP   Fluticasone furoate-vilanterol (BREO ELLIPTA) 200-25 MCG/INH AEPB inhaler Inhale 1 puff into the lungs daily 1/20/22   ORLANDO Crespo - CNP   albuterol sulfate  (90 Base) MCG/ACT inhaler Inhale 2 puffs into the lungs every 4 hours as needed for Wheezing or Shortness of Breath 7/8/21   ORLANDO Mejia - CNP       Allergies:  Patient has no known allergies. Social History:  The patient currently lives with family    TOBACCO:   reports that he has been smoking cigarettes and cigars. He has been smoking an average of 1 pack per day. He has never used smokeless tobacco.  ETOH:   reports that he does not currently use alcohol. Family History:  Reviewed in detail and negative for DM, Early CAD, Cancer, CVA. Positive as follows:        Problem Relation Age of Onset    Heart Attack Father        REVIEW OF SYSTEMS:   Positive for dizziness more pronounced from lying to sitting or sitting to lying and as noted in the HPI. All other systems reviewed and negative. PHYSICAL EXAM:    BP (!) 161/91   Pulse 81   Temp 98.3 °F (36.8 °C) (Oral)   Resp 21   Ht 5' 7\" (1.702 m)   Wt 182 lb (82.6 kg)   SpO2 100%   BMI 28.51 kg/m²     General appearance: No apparent distress appears stated age and cooperative. HEENT Normal cephalic, atraumatic without obvious deformity. Pupils equal, round, and reactive to light. Extra ocular muscles intact. Conjunctivae/corneas clear. Neck: Supple, No jugular venous distention/bruits. Lungs: Clear to auscultation, bilaterally without Rales/Wheezes/Rhonchi with good respiratory effort. Heart: Regular rate and rhythm with Normal S1/S2 without murmurs, rubs or gallops  Abdomen: Soft, non-tender or non-distended without rigidity or guarding and positive bowel sounds  Extremities: No clubbing, cyanosis, or edema bilaterally. Skin: Skin color, texture, turgor normal.  No rashes or lesions. Neurologic: Alert and oriented X 3, neurovascularly intact with sensory/motor intact upper extremities/lower extremities, bilaterally.   Cranial nerves: II-XII intact, grossly non-focal.  Mental status: Alert, oriented, thought content appropriate. CBC   Recent Labs     12/15/22  0241   WBC 8.3   HGB 14.8   HCT 44.4         RENAL  Recent Labs     12/15/22  0241      K 4.3      CO2 27   BUN 6*   CREATININE 1.1     LFT'S  Recent Labs     12/15/22  0241   AST 23   ALT 22   BILITOT 0.3   ALKPHOS 80       CARDIAC ENZYMES  Recent Labs     12/15/22  0241   TROPONINI <0.01         Active Hospital Problems    Diagnosis Date Noted    Benign paroxysmal positional vertigo due to bilateral vestibular disorder [H81.13] 12/15/2022     Priority: Medium    Dyslipidemia [E78.5] 01/14/2020    Chronic obstructive pulmonary disease ([facility] [address] [J44.9] 01/14/2020         ASSESSMENT/PLAN:  45 y.o. male with history of COPD/asthma, hyperlipidemia, peripheral vascular disease with previous left leg ischemia normal on chronic anticoagulation who presented with 4-day history of positional dizziness likely benign positional vertigo. We will need to rule out brainstem pathology    Plan:  - Get MRI brain  - Continue Plavix and Xarelto for peripheral arterial disease  - As needed meclizine  - PT consult for vestibular exercises  - Continue other chronic home medication      DVT Prophylaxis: On Xarelto  Diet: General diet  Code Status: Full code         Juliano Armando MD    Thank you ORLANDO Nuñez - EDNA for the opportunity to be involved in this patient's care. If you have any questions or concerns please feel free to contact me at [phone].

## 2022-12-15 NOTE — PROGRESS NOTES
Data- discharge order received, pt verbalized agreement to discharge, disposition to previous residence, no needs for HHC/DME. Action- discharge instructions prepared and given to pt, pt verbalized understanding. Medication information packet given r/t NEW and/or CHANGED prescriptions emphasizing name/purpose/side effects, pt verbalized understanding. Discharge instruction summary: Diet- regular, Activity- as tolerated, Primary Care Physician as follows: Annamarie Allen, ORLANDO - [phone] f/u appointment to be scheduled by pt, immunizations reviewed and N/A, prescription medications filled at [address] [place] [address] of [facility]. 1. WEIGHT: Admit Weight: 182 lb (82.6 kg) (12/15/22 0217)        Today  Weight: 182 lb (82.6 kg) (12/15/22 0217)       2. O2 SAT.: SpO2: 93 % (12/15/22 1702)    Response- Pt belongings gathered, IV removed. Disposition is home (no HHC/DME needs), transported with belongings, taken to lobby via w/c w/ staff, no complications.

## 2022-12-15 NOTE — ED PROVIDER NOTES
[address] [facility]    Physician Attestation    Pt Name: Imelda Leventhal  MRN: [id number]  Brady 1970  Date of evaluation: 12/15/22        Physician Note:    I havepersonally performed and/or participated in the history, exam and medical decision making and agree with all pertinent clinical information. I have also reviewed and agree with the past medical, family and social historyunless otherwise noted. I have personally performed a face to face diagnostic evaluation onthis patient. I have reviewed the mid-levels findings and agree. History: Positional vertigo off and on for 4 days when he lays down and sits up but he is okay during the day also noticed some left scapular pain which he has trouble describing but it was not in the chest did not cause any shortness of breath but it is new for him and he cannot reproduce the pain with movement or breathing      REVIEW OF SYSTEMS    Constitutional:  Denies fever, chills, or weakness   Eyes:  Denies vision changes  HENT:  Denies sore throat or neck pain   Respiratory:  Denies cough or shortness of breath   Cardiovascular:  Denies chest pain  GI:  Denies abdominal pain, nausea, vomiting, or diarrhea   Musculoskeletal:  Denies back pain   Skin: no rash or vesicles   Neurologic:  no headache weakness focal    Lymphatic:  no swollen  nodes   Psychiatric: no si or hs thoughts     All systems negative except as marked. General Appearance:  Alert, cooperative, no distress, appears stated age. Head:  Normocephalic, without obvious abnormality, atraumatic. Eyes:  conjunctiva/corneas clear, EOM's intact. Sclera anicteric. ENT: Mucous membranes moist.   Neck: Supple, symmetrical, trachea midline, no adenopathy. No jugular venous distention. Lungs:   No Respiratory Distress. no rales  rhonchi rub   Chest Wall:  Nontender  no deformity   Heart:  Rsr no murmer gallop    Abdomen:   Soft nontender no organomegally Extremities:  Full range of motion. no deformity   Pulses: Equal  upper and lower    Skin:  No rashes or lesions to exposed skin. Neurologic: Alert and oriented X 3. Motor grossly normal.  Speech clear. Cr n 2-12 intact   EKG Interpretation    Interpreted by emergency department physician    Rhythm: normal sinus   Rate: normal  Axis: normal  Ectopy: none  Conduction: normal  ST Segments: no acute change  T Waves: no acute change  Q Waves: none    Clinical Impression: Normal sinus rhythm    Cristiana Rush MD  ET head unremarkable chest x-ray unremarkable EKG first troponin are normal patient will be admitted for further evaluation      1. Chest pain, unspecified type    2. Dizziness          DISPOSITION/PLAN  PATIENT REFERRED TO:  ORLANDO Muñoz CNP  [address] [facility]  [phone]    Schedule an appointment as soon as possible for a visit in 1 week(s)      ORLANDO Muñoz CNP  [address] [facility]  [phone]        DISCHARGE MEDICATIONS:  Discharge Medication List as of 12/15/2022  6:33 PM        START taking these medications    Details   meclizine (ANTIVERT) 12.5 MG tablet Take 1 tablet by mouth 3 times daily as needed for Dizziness, Disp-30 tablet, R-0Normal               Is this patient to be included in the SEP-1 Core Measure due to severe sepsis or septic shock? No   Exclusion criteria - the patient is NOT to be included for SEP-1 Core Measure due to:   Infection is not suspected      MD Cristiana Pendleton MD  12/17/22 6141

## 2022-12-15 NOTE — DISCHARGE SUMMARY
[facility] DISCHARGE SUMMARY    Patient Demographics    Patient. Farhana Hernández  Date of Birth. 1970  MRN. [id number]     Primary care provider. ORLANDO Ross CNP  (Tel: [phone])    Admit date: 12/15/2022    Discharge date (blank if same as Note Date): Note Date: 12/15/2022     Reason for Hospitalization. Chief Complaint   Patient presents with    Gastroesophageal Reflux     Pt vi EMS from home, states he has been having increased heartburn for 4 days, now has heaviness in arms. Is on blood thinner for hx of stents and clots in legs. Dizziness     States he gets dizzy when he sits up too fast           [address] [place] [address] Dr Han. Dizziness  The symptoms are likely secondary to benign positional vertigo.  -Discussed with patient MRI was negative for any acute stroke he did have remote chronic stroke which she had similar symptoms about a week ago where he did not seek any help  -Ultrasound was negative carotid arteries were negative patient will be discharged home  -On maximal therapy. Meclizine added on discharge patient tolerating diet no further dizziness ambulating    Consults. IP CONSULT TO NEUROLOGY    Physical examination on discharge day. /64   Pulse 65   Temp 97.3 °F (36.3 °C) (Temporal)   Resp 18   Ht 5' 7\" (1.702 m)   Wt 182 lb (82.6 kg)   SpO2 93%   BMI 28.51 kg/m²   General appearance. Alert. Looks comfortable. HEENT. Sclera clear. Moist mucus membranes. Cardiovascular. Regular rate and rhythm, normal S1, S2. No murmur. Respiratory. Not using accessory muscles. Clear to auscultation bilaterally, no wheeze. Gastrointestinal. Abdomen soft, non-tender, not distended, normal bowel sounds  Neurology. Facial symmetry. No speech deficits. Moving all extremities equally. Extremities. No edema in lower extremities. Skin.  Warm, dry, normal turgor    Condition at time of discharge stable     Medication instructions provided to patient at discharge. Medication List        START taking these medications      meclizine 12.5 MG tablet  Commonly known as: ANTIVERT  Take 1 tablet by mouth 3 times daily as needed for Dizziness            CONTINUE taking these medications      albuterol sulfate  (90 Base) MCG/ACT inhaler  Commonly known as: PROVENTIL;VENTOLIN;PROAIR  Inhale 2 puffs into the lungs every 4 hours as needed for Wheezing or Shortness of Breath     atorvastatin 80 MG tablet  Commonly known as: LIPITOR  Take 1 tablet by mouth at bedtime     Breo Ellipta 200-25 MCG/ACT Aepb inhaler  Generic drug: fluticasone furoate-vilanterol  Inhale 1 puff into the lungs daily     clopidogrel 75 MG tablet  Commonly known as: PLAVIX  Take 1 tablet by mouth daily     Xarelto 20 MG Tabs tablet  Generic drug: rivaroxaban  TAKE ONE TABLET BY MOUTH DAILY WITH BREAKFAST               Where to Get Your Medications        These medications were [facility] to [facility] [id number] [facility] [phone] Meg Hurtado [phone]  [address] [place] [address]      Phone: [phone]   meclizine 12.5 MG tablet         Discharge recommendations given to patient. Follow Up. pcp in 1 week   Disposition. home  Activity. activity as tolerated  Diet: ADULT DIET; Regular      Spent 45  minutes in discharge process.     Signed:  Jolly Brenner MD     12/15/2022 6:07 PM

## 2022-12-15 NOTE — PROGRESS NOTES
Pt admitted to room 5913 from ED via stretcher. Oriented to room & unit policies. Call light & belongings within reach. Fall precautions in place, bed alarm engaged. Pt denies needs at this time.

## 2022-12-15 NOTE — CONSULTS
In patient Neurology consult        [facility] Neurology      MD Scarlett Urena  1970    Date of Service: 12/15/2022    Referring Physician: Scott Marroquin MD      Reason for the consult and CC: New onset dizziness    HPI:   The patient is a 45y.o.  years old male with history of PVD and hyperlipidemia who was admitted to the hospital yesterday with new onset dizziness. Onset was hours prior to admission. Description feeling dizziness at home with lightheadedness but no severe spinning sensation, nausea or vomiting. Degree was severe. Duration was waxing and waning but persistent. No other relieving or aggravating factors. No clear triggers. No headache or chest pain. No prior history of dizziness recent change in medications. He decided come to the ER where he was admitted to initial CT head showed no acute findings. Today he feels better. Almost back to his baseline. No residual deficit.   Other review of system was unremarkable    Surgical history: Reviewed with the patient, noncontributory      Family History   Problem Relation Age of Onset    Heart Attack Father          Past Medical History:   Diagnosis Date    Asthma     COPD (chronic obstructive pulmonary disease) ([address]     Hyperlipidemia      Social History     Tobacco Use    Smoking status: Every Day     Packs/day: 1.00     Types: Cigarettes, Cigars    Smokeless tobacco: Never    Tobacco comments:     Smokes 1 cigar a day   Vaping Use    Vaping Use: Former   Substance Use Topics    Alcohol use: Not Currently    Drug use: Never     No Known Allergies  Current Facility-Administered Medications   Medication Dose Route Frequency Provider Last Rate Last Admin    atorvastatin (LIPITOR) tablet 80 mg  80 mg Oral Nightly Rosi Nelson MD        clopidogrel (PLAVIX) tablet 75 mg  75 mg Oral Daily Rosi Nelson MD   75 mg at 12/15/22 1253    rivaroxaban (XARELTO) tablet 20 mg  20 mg Oral Daily Rosi MD Leslie        sodium chloride flush 0.9 % injection 5-40 mL  5-40 mL IntraVENous 2 times per day Rosi Nelson MD   10 mL at 12/15/22 1253    sodium chloride flush 0.9 % injection 5-40 mL  5-40 mL IntraVENous PRN Rosi Nelson MD        0.9 % sodium chloride infusion   IntraVENous PRN Rosi Nelson MD        ondansetron (ZOFRAN-ODT) disintegrating tablet 4 mg  4 mg Oral Q8H PRN Rosi Nelson MD        Or    ondansetron (ZOFRAN) injection 4 mg  4 mg IntraVENous Q6H PRN Rosi Nelson MD        polyethylene glycol (GLYCOLAX) packet 17 g  17 g Oral Daily PRN Rosi Nelson MD        acetaminophen (TYLENOL) tablet 650 mg  650 mg Oral Q6H PRN Rosi Nelson MD        Or    acetaminophen (TYLENOL) suppository 650 mg  650 mg Rectal Q6H PRN Rosi Nelson MD        mometasone-formoterol (DULERA) 200-5 MCG/ACT inhaler 2 puff  2 puff Inhalation BID Rosi Nelson MD           ROS : A 10-14 system review of constitutional, cardiovascular, respiratory, eyes, musculoskeletal, endocrine, GI, ENT, skin, hematological, genitourinary, psychiatric and neurologic systems was obtained and updated today and is unremarkable except as mentioned in my HPI      Exam:     Constitutional:   Vitals:    12/15/22 0817 12/15/22 0829 12/15/22 1000 12/15/22 1148   BP: 120/77 111/77 119/78 138/84   Pulse: 65 64 64 63   Resp: 15 19 18 18   Temp:   97.9 °F (36.6 °C) 97.2 °F (36.2 °C)   TempSrc:   Temporal Temporal   SpO2: 96% 96% 98% 98%   Weight:       Height:           General appearance and observation: Normal development and appear in no acute distress. Eye:  Fundus: No blurring of optic disc. Neck: supple  Cardiovascular: No lower leg edema with good pulsation. Mental Status:   Oriented to person, place, problem, and time. Memory: Good immediate recall. Intact remote memory  Normal attention span and concentration.   Language: intact naming, repeating and fluency   Good fund of Knowledge. Aware of current events and vocabulary   Cranial Nerves:   II: Visual fields: Full. Pupils: equal, round, reactive to light  III,IV,VI: Extra Ocular Movements are intact. No nystagmus  V: Facial sensation is intact  VII: Facial strength and movements: intact and symmetric  VIII: Hearing: Intact  IX: Palate elevation is symmetric  XI: Shoulder shrug is intact  XII: Tongue movements are normal  Musculoskeletal: 5/5 in all 4 extremities. Tone: Normal tone. Reflexes: Symmetric 2+ in the arms and 2+ in the legs   Planters: flexor bilaterally. Coordination: no pronator drift, no dysmetria with FNF in upper extremities. Normal REM. Sensation: normal to all modalities in both arms and legs. Gait/Posture: steady gait and normal posturing and station. Data:  LABS:   Lab Results   Component Value Date/Time     12/15/2022 02:41 AM    K 4.3 12/15/2022 02:41 AM    K 3.9 03/20/2022 02:18 AM     12/15/2022 02:41 AM    CO2 27 12/15/2022 02:41 AM    BUN 6 12/15/2022 02:41 AM    CREATININE 1.1 12/15/2022 02:41 AM    GFRAA >60 03/24/2022 04:50 AM    LABGLOM >60 12/15/2022 02:41 AM    GLUCOSE 117 12/15/2022 02:41 AM    CALCIUM 9.2 12/15/2022 02:41 AM     Lab Results   Component Value Date/Time    WBC 8.3 12/15/2022 02:41 AM    RBC 4.62 12/15/2022 02:41 AM    HGB 14.8 12/15/2022 02:41 AM    HCT 44.4 12/15/2022 02:41 AM    MCV 96.1 12/15/2022 02:41 AM    RDW 13.8 12/15/2022 02:41 AM     12/15/2022 02:41 AM     Lab Results   Component Value Date    INR 1.17 (H) 03/18/2022    PROTIME 13.3 (H) 03/18/2022       Neuroimaging was independently reviewed by myself and discussed results with the patient   Reviewed notes from different physicians  Reviewed lab and blood testing    Impression:  New onset dizziness and vertigo. Possible peripheral vertigo. MRI was ordered to rule out central right new ischemic stroke.   He is already on Xarelto and Plavix for history of

## 2022-12-15 NOTE — ED PROVIDER NOTES
[id number] [facility]        Pt Name: Jessie Smith  MRN: [id number]  [place] 1970  Date of evaluation: 12/15/2022  Provider: ORLANDO Tapia CNP  PCP: ORLANDO Lantigua CNP  Note Started: 3:30 AM EST 12/15/22           I have seen and evaluated this patient with my supervising physician [facility]       Chief Complaint   Patient presents with    Gastroesophageal Reflux     Pt vi EMS from home, states he has been having increased heartburn for 4 days, now has heaviness in arms. Is on blood thinner for hx of stents and clots in legs. Dizziness     States he gets dizzy when he sits up too fast       HISTORY OF PRESENT ILLNESS   (Location, Timing/Onset, Context/Setting, Quality, Duration, Modifying Factors, Severity, Associated Signs and Symptoms)  Note limiting factors. Chief Complaint: chest pain, dizziness     Jessie Smith is a 45 y.o. male who presents to the emergency department with complaints of heartburn over the past few days but tonight experiencing heaviness/dull pain into the right arm. Patient reports that he is also having a sticking like pain to the left upper back. No injury or trauma. States he has been experiencing dizziness with position change over the 4 days as well. States that he gets dizziness with turning his head side to side as well as laying flat and sitting up. No gait disturbance. Denies double vision or weakness. Patient does continue to smoke despite recent stents in the legs, anticoagulated on Xarelto also taking clopidogrel. Denies any headache, fever, lightheadedness, visual disturbances. No neck pain. No shortness of breath, cough, or congestion. No abdominal pain, nausea, vomiting, diarrhea, constipation, or dysuria. No rash. Nursing Notes were all reviewed and agreed with or any disagreements were addressed in the HPI.     REVIEW OF SYSTEMS    (2-9 systems for level 4, 10 or more for level 5)     Review of Systems   Constitutional:  Negative for activity change, chills and fever. Respiratory:  Negative for chest tightness and shortness of breath. Cardiovascular:  Positive for chest pain. Gastrointestinal:  Negative for abdominal pain, diarrhea, nausea and vomiting. Genitourinary:  Negative for dysuria. Neurological:  Positive for dizziness. All other systems reviewed and are negative. Positives and Pertinent negatives as per HPI. Except as noted above in the ROS, all other systems were reviewed and negative. PAST MEDICAL HISTORY     Past Medical History:   Diagnosis Date    Asthma     COPD (chronic obstructive pulmonary disease) ([facility] [address]     Hyperlipidemia          SURGICAL HISTORY   No past surgical history on file. [address]       Discharge Medication List as of 12/15/2022  6:33 PM        CONTINUE these medications which have NOT CHANGED    Details   atorvastatin (LIPITOR) 80 MG tablet Take 1 tablet by mouth at bedtime, Disp-30 tablet, R-11Normal      XARELTO 20 MG TABS tablet TAKE ONE TABLET BY MOUTH DAILY WITH BREAKFAST, Disp-30 tablet, R-5Normal      clopidogrel (PLAVIX) 75 MG tablet Take 1 tablet by mouth daily, Disp-30 tablet, R-3Normal      Fluticasone furoate-vilanterol (BREO ELLIPTA) 200-25 MCG/INH AEPB inhaler Inhale 1 puff into the lungs daily, Disp-28 each, R-11Normal      albuterol sulfate  (90 Base) MCG/ACT inhaler Inhale 2 puffs into the lungs every 4 hours as needed for Wheezing or Shortness of Breath, Disp-8.5 Inhaler, R-5Pharmacy may dispense ANY albuterol product based on formulary, cost, or availability. Normal               ALLERGIES     Patient has no known allergies.     FAMILYHISTORY       Family History   Problem Relation Age of Onset    Heart Attack Father           SOCIAL HISTORY       Social History     Tobacco Use    Smoking status: Every Day     Packs/day: 1.00     Types: Cigarettes, Cigars    Smokeless tobacco: Never    Tobacco comments:     Smokes 1 cigar a day   Vaping Use    Vaping Use: Former   Substance Use Topics    Alcohol use: Not Currently    Drug use: Never       SCREENINGS   NIH Stroke Scale  Interval: Baseline  Level of Consciousness (1a): Alert  LOC Questions (1b): Answers both correctly  LOC Commands (1c): Performs both tasks correctly  Best Gaze (2): Normal  Visual (3): No visual loss  Facial Palsy (4): Normal symmetrical movement  Motor Arm, Left (5a): No drift  Motor Arm, Right (5b): No drift  Motor Leg, Left (6a): No drift  Motor Leg, Right (6b): No drift  Limb Ataxia (7): Absent  Sensory (8): Normal  Best Language (9): No aphasia  Dysarthria (10): Normal  Extinction and Inattention (11): No abnormality  Total: 0    [place] Coma Scale  Eye Opening: Spontaneous  Best Verbal Response: Oriented  Best Motor Response: Obeys commands  [place] Coma Scale Score: 15                CIWA Assessment  BP: 103/64  Heart Rate: 65             PHYSICAL EXAM    (up to 7 for level 4, 8 or more for level 5)     ED Triage Vitals [12/15/22 0217]   BP Temp Temp Source Heart Rate Resp SpO2 Height Weight   (!) 161/91 98.3 °F (36.8 °C) Oral 81 21 100 % 5' 7\" (1.702 m) 182 lb (82.6 kg)       Physical Exam  Vitals and nursing note reviewed. Constitutional:       Appearance: He is well-developed. He is not diaphoretic. HENT:      Head: Normocephalic and atraumatic. Right Ear: External ear normal.      Left Ear: External ear normal.   Eyes:      General:         Right eye: No discharge. Left eye: No discharge. Neck:      Vascular: No JVD. Cardiovascular:      Rate and Rhythm: Normal rate and regular rhythm. Pulses: Normal pulses. Heart sounds: Normal heart sounds. Pulmonary:      Effort: Pulmonary effort is normal. No respiratory distress. Breath sounds: Normal breath sounds. Abdominal:      Palpations: Abdomen is soft. Musculoskeletal:         General: Normal range of motion.    Skin: General: Skin is warm and dry. Coloration: Skin is not pale. Neurological:      Mental Status: He is alert. Cranial Nerves: Cranial nerves 2-12 are intact. Sensory: Sensation is intact. Motor: Motor function is intact. Coordination: Coordination is intact. Psychiatric:         Behavior: Behavior normal.       DIAGNOSTIC RESULTS   LABS:    Labs Reviewed   BRAIN NATRIURETIC PEPTIDE - Abnormal; Notable for the following components:       Result Value    Pro- (*)     All other components within normal limits   CBC WITH AUTO DIFFERENTIAL - Abnormal; Notable for the following components:    Basophils Absolute 0.4 (*)     All other components within normal limits   COMPREHENSIVE METABOLIC PANEL - Abnormal; Notable for the following components:    Glucose 117 (*)     BUN 6 (*)     All other components within normal limits   TROPONIN       When ordered only abnormal lab results are displayed. All other labs were within normal range or not returned as of this dictation. EKG: When ordered, EKG's are interpreted by the Emergency Department Physician in the absence of a cardiologist.  Please see their note for interpretation of EKG. RADIOLOGY:   Non-plain film images such as CT, Ultrasound and MRI are read by the radiologist. Plain radiographic images are visualized and preliminarily interpreted by the ED Provider with the below findings:        Interpretation per the Radiologist below, if available at the time of this note:    VL DUP CAROTID BILATERAL   Final Result      MRI BRAIN WO CONTRAST   Final Result   1. No acute intracranial abnormality. No acute infarct. 2. Small chronic infarcts involving the left parietal and left occipital   lobes. 3. Minimal global parenchymal volume loss with minimal chronic microvascular   ischemic changes. CT HEAD WO CONTRAST   Final Result   No acute intracranial abnormality.       Small focal area of chronic encephalomalacia is seen in the left   parietooccipital junction. XR CHEST PORTABLE   Final Result   Stable chest without acute cardiopulmonary process. No results found. No results found. PROCEDURES   Unless otherwise noted below, none     Procedures    CRITICAL CARE TIME       CONSULTS:  IP CONSULT TO NEUROLOGY      EMERGENCY DEPARTMENT COURSE and DIFFERENTIAL DIAGNOSIS/MDM:   Vitals:    Vitals:    12/15/22 0829 12/15/22 1000 12/15/22 1148 12/15/22 1702   BP: 111/77 119/78 138/84 103/64   Pulse: 64 64 63 65   Resp: 19 18 18 18   Temp:  97.9 °F (36.6 °C) 97.2 °F (36.2 °C) 97.3 °F (36.3 °C)   TempSrc:  Temporal Temporal Temporal   SpO2: 96% 98% 98% 93%   Weight:       Height:           Patient was given the following medications:  Medications - No data to display      Is this patient to be included in the SEP-1 Core Measure due to severe sepsis or septic shock? No   Exclusion criteria - the patient is NOT to be included for SEP-1 Core Measure due to: Infection is not suspected    Briefly, this is a 18-year-old male with medical history including COPD, dyslipidemia, ischemia of lower extremity that presents to the emergency department complaining of heartburn, heaviness to the right arm and a sticking pain to the left upper back. Intermittent symptoms over the past 4 days, worsening. States that he is also dizzy with position change. Bilateral TMs are clear without cerumen impaction. Patient does become dizzy with moving his head side to side. Labs are unremarkable, chest x-ray and CT head are pending. Patient's visit does exceed the length of my shift, he will be dispositioned by attending physician, please see his note. Patient was signed out to Dr. Angelia Larkin at 4 am.    FINAL IMPRESSION      1. Chest pain, unspecified type    2.  Dizziness          DISPOSITION/PLAN   DISPOSITION Admitted 12/15/2022 06:26:58 AM      PATIENT REFERRED TO:  ORLANDO Ellsworth - [facility] [address]  [phone]    Schedule an appointment as soon as possible for a visit in 1 week(s)      ORLANDO Steve CNP  [address] [facility]  [phone]          DISCHARGE MEDICATIONS:  Discharge Medication List as of 12/15/2022  6:33 PM        START taking these medications    Details   meclizine (ANTIVERT) 12.5 MG tablet Take 1 tablet by mouth 3 times daily as needed for Dizziness, Disp-30 tablet, R-0Normal             DISCONTINUED MEDICATIONS:  Discharge Medication List as of 12/15/2022  6:33 PM        STOP taking these medications       triamcinolone (KENALOG) 0.1 % cream Comments:   Reason for Stopping:         rivaroxaban 15 & 20 MG Starter Pack Comments:   Reason for Stopping:                      (Please note that portions of this note were completed with a voice recognition program.  Efforts were made to edit the dictations but occasionally words are mis-transcribed.)    ORLANDO Rizzo CNP (electronically signed)            ORLANDO Rizzo CNP  12/15/22 [address] [facility], APRN - CNP  12/16/22 2613

## 2023-01-05 ENCOUNTER — OFFICE VISIT (OUTPATIENT)
Dept: INTERNAL MEDICINE CLINIC | Age: 53
End: 2023-01-05

## 2023-01-05 VITALS
HEART RATE: 83 BPM | WEIGHT: 178 LBS | BODY MASS INDEX: 27.94 KG/M2 | SYSTOLIC BLOOD PRESSURE: 100 MMHG | OXYGEN SATURATION: 95 % | HEIGHT: 67 IN | DIASTOLIC BLOOD PRESSURE: 78 MMHG

## 2023-01-05 DIAGNOSIS — J45.50 SEVERE PERSISTENT ASTHMA WITHOUT COMPLICATION: ICD-10-CM

## 2023-01-05 DIAGNOSIS — Z09 HOSPITAL DISCHARGE FOLLOW-UP: Primary | ICD-10-CM

## 2023-01-05 DIAGNOSIS — R42 VERTIGO: ICD-10-CM

## 2023-01-05 DIAGNOSIS — E78.5 DYSLIPIDEMIA: ICD-10-CM

## 2023-01-05 DIAGNOSIS — I63.89 PARIETAL LOBE INFARCTION (HCC): ICD-10-CM

## 2023-01-05 DIAGNOSIS — J44.9 CHRONIC OBSTRUCTIVE PULMONARY DISEASE, UNSPECIFIED COPD TYPE (HCC): ICD-10-CM

## 2023-01-05 DIAGNOSIS — I63.9 OCCIPITAL INFARCTION (HCC): ICD-10-CM

## 2023-01-05 RX ORDER — ALBUTEROL SULFATE 90 UG/1
2 AEROSOL, METERED RESPIRATORY (INHALATION) EVERY 4 HOURS PRN
Qty: 8.5 EACH | Refills: 5 | Status: SHIPPED | OUTPATIENT
Start: 2023-01-05

## 2023-01-05 RX ORDER — MECLIZINE HCL 12.5 MG/1
12.5 TABLET ORAL 3 TIMES DAILY PRN
COMMUNITY

## 2023-01-05 ASSESSMENT — PATIENT HEALTH QUESTIONNAIRE - PHQ9
SUM OF ALL RESPONSES TO PHQ QUESTIONS 1-9: 0
SUM OF ALL RESPONSES TO PHQ QUESTIONS 1-9: 0
2. FEELING DOWN, DEPRESSED OR HOPELESS: 0
1. LITTLE INTEREST OR PLEASURE IN DOING THINGS: 0
SUM OF ALL RESPONSES TO PHQ9 QUESTIONS 1 & 2: 0
SUM OF ALL RESPONSES TO PHQ QUESTIONS 1-9: 0
SUM OF ALL RESPONSES TO PHQ QUESTIONS 1-9: 0

## 2023-01-11 ASSESSMENT — ENCOUNTER SYMPTOMS
RESPIRATORY NEGATIVE: 1
GASTROINTESTINAL NEGATIVE: 1

## 2023-01-11 NOTE — PROGRESS NOTES
SUBJECTIVE:    Patient ID: Maurisio Vega is a 45 y.o. male. CC: Hospital follow-up    HPI: The patient presents to the office today for hospital follow-up. On 12/15/2022, the patient presented to [facility] via EMS from home with complaint of increased heartburn for 4 days, heaviness in the arms, and dizziness. Patient was admitted for observation admission with planned for brain MRI, continue Plavix and Xarelto for peripheral arterial disease, physical therapy consult for vestibular exercises, and evaluation by neurology. X-ray was clear. Head CT showed no acute abnormality. Brain MRI showed no acute intercranial abnormality. There was evidence of small chronic infarcts. Carotid Doppler showed less than 50% stenosis. Patient was felt to be safe for discharge. Dizziness  The symptoms are likely secondary to benign positional vertigo.  -Discussed with patient MRI was negative for any acute stroke he did have remote chronic stroke which she had similar symptoms about a week ago where he did not seek any help  -Ultrasound was negative carotid arteries were negative patient will be discharged home  -On maximal therapy.   Meclizine added on discharge patient tolerating diet no further dizziness ambulating      Past Medical History:   Diagnosis Date    Asthma     COPD (chronic obstructive pulmonary disease) ([facility])     Hyperlipidemia         Current Outpatient Medications   Medication Sig Dispense Refill    meclizine (ANTIVERT) 12.5 MG tablet Take 12.5 mg by mouth 3 times daily as needed      albuterol sulfate HFA (PROVENTIL;VENTOLIN;PROAIR) 108 (90 Base) MCG/ACT inhaler Inhale 2 puffs into the lungs every 4 hours as needed for Wheezing or Shortness of Breath 8.5 each 5    atorvastatin (LIPITOR) 80 MG tablet Take 1 tablet by mouth at bedtime 30 tablet 11    XARELTO 20 MG TABS tablet TAKE ONE TABLET BY MOUTH DAILY WITH BREAKFAST 30 tablet 5    clopidogrel (PLAVIX) 75 MG tablet Take 1 tablet by mouth daily 30 tablet 3    Fluticasone furoate-vilanterol (BREO ELLIPTA) 200-25 MCG/INH AEPB inhaler Inhale 1 puff into the lungs daily 28 each 11     No current facility-administered medications for this visit.           Review of Systems   Constitutional: Negative.    Respiratory: Negative.     Cardiovascular: Negative.    Gastrointestinal: Negative.    Genitourinary: Negative.    Musculoskeletal: Negative.    Neurological:  Positive for dizziness.   Hematological: Negative.    Psychiatric/Behavioral: Negative.     All other systems reviewed and are negative.      OBJECTIVE:  Physical Exam  Vitals reviewed.   Constitutional:       General: He is not in acute distress.     Appearance: He is well-developed. He is not diaphoretic.   HENT:      Head: Normocephalic and atraumatic.   Eyes:      General: No scleral icterus.     Conjunctiva/sclera: Conjunctivae normal.   Neck:      Vascular: No JVD.   Cardiovascular:      Rate and Rhythm: Normal rate and regular rhythm.   Pulmonary:      Effort: Pulmonary effort is normal. No respiratory distress.      Breath sounds: Normal breath sounds. No wheezing or rales.   Abdominal:      General: There is no distension.      Palpations: Abdomen is soft.      Tenderness: There is no abdominal tenderness. There is no guarding or rebound.   Musculoskeletal:         General: Normal range of motion.      Cervical back: Neck supple.   Skin:     General: Skin is warm and dry.   Neurological:      Mental Status: He is alert and oriented to person, place, and time.   Psychiatric:         Behavior: Behavior normal.         Thought Content: Thought content normal.      /78   Pulse 83   Ht 5' 7\" (1.702 m)   Wt 178 lb (80.7 kg)   SpO2 95%   BMI 27.88 kg/m²      PHQ Scores 1/5/2023 11/7/2022 7/8/2021 1/13/2020 5/7/2019   PHQ2 Score 0 0 0 0 0   PHQ9 Score 0 0 0 0 0     Interpretation of Total Score Depression Severity: 1-4 = Minimal depression, 5-9 = Mild depression,  10-14 = Moderate depression, 15-19 = Moderately severe depression, 20-27 =Severe depression      MRI brain:  1. No acute intracranial abnormality. No acute infarct. 2. Small chronic infarcts involving the left parietal and left occipital   lobes. 3. Minimal global parenchymal volume loss with minimal chronic microvascular   ischemic changes. ASSESSMENT/PLAN:  Gayathri Chow was seen today for follow-up from hospital and discuss medications. Diagnoses and all orders for this visit:    Hospital discharge follow-up    Parietal lobe infarction [facility])  Occipital infarction ([facility]  -Old, chronic infarcts noted on MRI  -Patient evaluated by neurology  -Continue Plavix and Xarelto, statin    Vertigo  - Continues to have vertigo  - Will refer to PT for vestibular rehab  -     PT vestibular rehab    Dyslipidemia  - Chronic, stable  - Continue current regimen    Chronic obstructive pulmonary disease, unspecified COPD type ([facility] [address]  - Chronic, stable  - Continue current regimen  -     albuterol sulfate HFA (PROVENTIL;VENTOLIN;PROAIR) 108 (90 Base) MCG/ACT inhaler; Inhale 2 puffs into the lungs every 4 hours as needed for Wheezing or Shortness of Breath    Severe persistent asthma without complication  - Chronic, stable  - Continue current regimen  -     albuterol sulfate HFA (PROVENTIL;VENTOLIN;PROAIR) 108 (90 Base) MCG/ACT inhaler;  Inhale 2 puffs into the lungs every 4 hours as needed for Wheezing or Shortness of Breath        ORLANDO Amaya - CNP

## 2023-01-23 DIAGNOSIS — J44.9 CHRONIC OBSTRUCTIVE PULMONARY DISEASE, UNSPECIFIED COPD TYPE (HCC): ICD-10-CM

## 2023-01-23 DIAGNOSIS — J45.50 SEVERE PERSISTENT ASTHMA WITHOUT COMPLICATION: ICD-10-CM

## 2023-03-02 ENCOUNTER — TELEPHONE (OUTPATIENT)
Dept: SURGERY | Age: 53
End: 2023-03-02

## 2023-03-02 NOTE — TELEPHONE ENCOUNTER
LM for patient to call back to schedule a LLE ADS in the FF office sometime after 5/9/23.  Test only, will call with results

## 2023-04-24 ENCOUNTER — TELEPHONE (OUTPATIENT)
Dept: SURGERY | Age: 53
End: 2023-04-24

## 2023-04-24 DIAGNOSIS — I99.8 ISCHEMIA OF LEFT LOWER EXTREMITY: Primary | ICD-10-CM

## 2023-04-24 RX ORDER — CLOPIDOGREL BISULFATE 75 MG/1
75 TABLET ORAL DAILY
Qty: 30 TABLET | Refills: 5 | Status: SHIPPED | OUTPATIENT
Start: 2023-04-24

## 2023-04-24 NOTE — TELEPHONE ENCOUNTER
Pt has a question about 2 medications Zarelto and Clopidogrel he is getting refilled/taking.  Please call and advise [phone]

## 2023-05-11 ENCOUNTER — PROCEDURE VISIT (OUTPATIENT)
Dept: VASCULAR SURGERY | Age: 53
End: 2023-05-11
Payer: COMMERCIAL

## 2023-05-11 DIAGNOSIS — I70.212 ATHEROSCLEROSIS OF NATIVE ARTERIES OF EXTREMITIES WITH INTERMITTENT CLAUDICATION, LEFT LEG (HCC): ICD-10-CM

## 2023-05-11 PROCEDURE — 93926 LOWER EXTREMITY STUDY: CPT | Performed by: SURGERY

## 2023-05-16 ENCOUNTER — TELEPHONE (OUTPATIENT)
Dept: VASCULAR SURGERY | Age: 53
End: 2023-05-16

## 2023-05-16 DIAGNOSIS — I70.212 ATHEROSCLEROSIS OF NATIVE ARTERIES OF EXTREMITIES WITH INTERMITTENT CLAUDICATION, LEFT LEG (HCC): Primary | ICD-10-CM

## 2023-05-16 NOTE — TELEPHONE ENCOUNTER
Discussed results of LLE arterial duplex which are stable, YL 0.85 (previously 0.95). Patient reports doing well. Plan to repeat duplex in 6 months for continued surveillance.     Electronically signed by ORLANDO Argueta CNP on 5/16/2023 at 2:20 PM

## 2023-05-30 ENCOUNTER — OFFICE VISIT (OUTPATIENT)
Dept: INTERNAL MEDICINE CLINIC | Age: 53
End: 2023-05-30
Payer: COMMERCIAL

## 2023-05-30 VITALS
BODY MASS INDEX: 28.09 KG/M2 | HEIGHT: 67 IN | SYSTOLIC BLOOD PRESSURE: 124 MMHG | HEART RATE: 86 BPM | OXYGEN SATURATION: 97 % | DIASTOLIC BLOOD PRESSURE: 78 MMHG | WEIGHT: 179 LBS

## 2023-05-30 DIAGNOSIS — H81.13 BENIGN PAROXYSMAL POSITIONAL VERTIGO DUE TO BILATERAL VESTIBULAR DISORDER: ICD-10-CM

## 2023-05-30 DIAGNOSIS — J44.9 CHRONIC OBSTRUCTIVE PULMONARY DISEASE, UNSPECIFIED COPD TYPE (HCC): ICD-10-CM

## 2023-05-30 DIAGNOSIS — J45.50 SEVERE PERSISTENT ASTHMA WITHOUT COMPLICATION: ICD-10-CM

## 2023-05-30 DIAGNOSIS — Z86.73 HISTORY OF ISCHEMIC STROKE: ICD-10-CM

## 2023-05-30 DIAGNOSIS — E78.5 DYSLIPIDEMIA: ICD-10-CM

## 2023-05-30 DIAGNOSIS — L55.9 SUNBURN: Primary | ICD-10-CM

## 2023-05-30 PROCEDURE — 4004F PT TOBACCO SCREEN RCVD TLK: CPT | Performed by: NURSE PRACTITIONER

## 2023-05-30 PROCEDURE — G8417 CALC BMI ABV UP PARAM F/U: HCPCS | Performed by: NURSE PRACTITIONER

## 2023-05-30 PROCEDURE — 3017F COLORECTAL CA SCREEN DOC REV: CPT | Performed by: NURSE PRACTITIONER

## 2023-05-30 PROCEDURE — G8427 DOCREV CUR MEDS BY ELIG CLIN: HCPCS | Performed by: NURSE PRACTITIONER

## 2023-05-30 PROCEDURE — 99214 OFFICE O/P EST MOD 30 MIN: CPT | Performed by: NURSE PRACTITIONER

## 2023-05-30 PROCEDURE — 3023F SPIROM DOC REV: CPT | Performed by: NURSE PRACTITIONER

## 2023-06-05 PROBLEM — Z86.73 HISTORY OF ISCHEMIC STROKE: Status: ACTIVE | Noted: 2023-06-05

## 2023-06-05 PROBLEM — J45.50 SEVERE PERSISTENT ASTHMA WITHOUT COMPLICATION: Status: ACTIVE | Noted: 2023-06-05

## 2023-06-05 ASSESSMENT — ENCOUNTER SYMPTOMS
GASTROINTESTINAL NEGATIVE: 1
RESPIRATORY NEGATIVE: 1

## 2023-07-18 DIAGNOSIS — J44.9 CHRONIC OBSTRUCTIVE PULMONARY DISEASE, UNSPECIFIED COPD TYPE (HCC): ICD-10-CM

## 2023-07-18 DIAGNOSIS — J45.50 SEVERE PERSISTENT ASTHMA WITHOUT COMPLICATION: ICD-10-CM

## 2023-07-19 RX ORDER — FLUTICASONE FUROATE AND VILANTEROL TRIFENATATE 200; 25 UG/1; UG/1
POWDER RESPIRATORY (INHALATION)
Qty: 1 EACH | Refills: 2 | Status: SHIPPED | OUTPATIENT
Start: 2023-07-19

## 2023-08-20 DIAGNOSIS — I99.8 ISCHEMIA OF LEFT LOWER EXTREMITY: ICD-10-CM

## 2023-08-21 RX ORDER — CLOPIDOGREL BISULFATE 75 MG/1
TABLET ORAL
Qty: 90 TABLET | Refills: 3 | Status: SHIPPED | OUTPATIENT
Start: 2023-08-21

## 2023-08-21 RX ORDER — RIVAROXABAN 20 MG/1
TABLET, FILM COATED ORAL
Qty: 90 TABLET | Refills: 3 | Status: SHIPPED | OUTPATIENT
Start: 2023-08-21

## 2023-11-06 NOTE — PROGRESS NOTES
Assessment:   Blood pressure at today’s office visit 170/70 mmHg, uncontrolled. Recheck: 165/80  Blood Pressure is above goal as per JNC-8 less than 140/90 mmHg,   Currently on HCT 25 mg BID, Losartan 100 mg QD, metoprolol succinate 25 mg QD and non-compliant. Verbalized that he takes his medications every day, each day, but when questioned had no taken any today. Aerobic Exercise recommendations and diet, compliant walks every day. Does not complain of snoring, being exhausted, sleepy,  headache or irritability during the day. Plan:   D/C current medication regiment. Start losartan on a lower dose of 50 mg QD due to it causing him stomach problems. The patient was educated on the importance of medication compliance and to monitor her blood pressure at home daily in a quiet room; after resting for at least 5 minutes and to bring the logs at next visit.    Continue walking daily  recommend sodium and fat reduction   Reassess BP in 2 weeks Patient arrived to 4466 from ER. Vitals stable. Admission and assessment completed. Left foot cap refill sluggish >3 secs. Left pedal pulse not palpable, unable to hear on doppler. Pt reports full sensation and pain of left foot. Red, dry, flaky patches noted scattered on skin, pt reports psoriasis. Heparin gtt infusing, next aPTT at 0630. No needs voiced at this time. The care plan and education has been reviewed and mutually agreed upon with the patient.    Fall precautions in place, hourly rounding, call light and belongings in reach, bed in lowest position, wheels locked in place, side rails up x 2, walkways free of clutter

## 2023-11-20 ENCOUNTER — TELEPHONE (OUTPATIENT)
Dept: VASCULAR SURGERY | Age: 53
End: 2023-11-20

## 2023-11-20 ENCOUNTER — PROCEDURE VISIT (OUTPATIENT)
Dept: VASCULAR SURGERY | Age: 53
End: 2023-11-20
Payer: COMMERCIAL

## 2023-11-20 DIAGNOSIS — I70.212 ATHEROSCLEROSIS OF NATIVE ARTERIES OF EXTREMITIES WITH INTERMITTENT CLAUDICATION, LEFT LEG (HCC): ICD-10-CM

## 2023-11-20 DIAGNOSIS — I70.212 ATHEROSCLEROSIS OF NATIVE ARTERIES OF EXTREMITIES WITH INTERMITTENT CLAUDICATION, LEFT LEG (HCC): Primary | ICD-10-CM

## 2023-11-20 PROCEDURE — 93926 LOWER EXTREMITY STUDY: CPT | Performed by: SURGERY

## 2023-11-30 ENCOUNTER — OFFICE VISIT (OUTPATIENT)
Dept: INTERNAL MEDICINE CLINIC | Age: 53
End: 2023-11-30
Payer: COMMERCIAL

## 2023-11-30 VITALS
OXYGEN SATURATION: 95 % | WEIGHT: 180.4 LBS | HEIGHT: 67 IN | DIASTOLIC BLOOD PRESSURE: 90 MMHG | SYSTOLIC BLOOD PRESSURE: 140 MMHG | HEART RATE: 81 BPM | BODY MASS INDEX: 28.31 KG/M2

## 2023-11-30 DIAGNOSIS — J44.9 CHRONIC OBSTRUCTIVE PULMONARY DISEASE, UNSPECIFIED COPD TYPE (HCC): ICD-10-CM

## 2023-11-30 DIAGNOSIS — Z23 FLU VACCINE NEED: ICD-10-CM

## 2023-11-30 DIAGNOSIS — J45.50 SEVERE PERSISTENT ASTHMA WITHOUT COMPLICATION: ICD-10-CM

## 2023-11-30 DIAGNOSIS — Z23 NEED FOR PNEUMOCOCCAL VACCINATION: ICD-10-CM

## 2023-11-30 DIAGNOSIS — R73.01 IMPAIRED FASTING GLUCOSE: ICD-10-CM

## 2023-11-30 DIAGNOSIS — Z12.11 COLON CANCER SCREENING: ICD-10-CM

## 2023-11-30 DIAGNOSIS — Z00.00 ANNUAL PHYSICAL EXAM: Primary | ICD-10-CM

## 2023-11-30 DIAGNOSIS — E55.9 VITAMIN D DEFICIENCY: ICD-10-CM

## 2023-11-30 DIAGNOSIS — E78.5 DYSLIPIDEMIA: ICD-10-CM

## 2023-11-30 PROCEDURE — 90677 PCV20 VACCINE IM: CPT | Performed by: NURSE PRACTITIONER

## 2023-11-30 PROCEDURE — 90472 IMMUNIZATION ADMIN EACH ADD: CPT | Performed by: NURSE PRACTITIONER

## 2023-11-30 PROCEDURE — 90674 CCIIV4 VAC NO PRSV 0.5 ML IM: CPT | Performed by: NURSE PRACTITIONER

## 2023-11-30 PROCEDURE — 90471 IMMUNIZATION ADMIN: CPT | Performed by: NURSE PRACTITIONER

## 2023-11-30 PROCEDURE — G8482 FLU IMMUNIZE ORDER/ADMIN: HCPCS | Performed by: NURSE PRACTITIONER

## 2023-11-30 PROCEDURE — 99396 PREV VISIT EST AGE 40-64: CPT | Performed by: NURSE PRACTITIONER

## 2023-12-11 ASSESSMENT — ENCOUNTER SYMPTOMS
RESPIRATORY NEGATIVE: 1
GASTROINTESTINAL NEGATIVE: 1

## 2023-12-11 NOTE — PROGRESS NOTES
SUBJECTIVE:    Patient ID: Jose L Suggs is a 52 y.o. male. CC: Annual physical exam, COPD/asthma, dyslipidemia    HPI: The patient presents to the office today for annual physical and follow-up of medical conditions. He has history of COPD/asthma. He denies any recent exacerbation. He is using Breo and albuterol. History of dyslipidemia. No chest pain. He is on statin. History of parietal lobe and occipital infarction. He is taking Plavix and Xarelto, cholesterol treatment      Past Medical History:   Diagnosis Date    Asthma     COPD (chronic obstructive pulmonary disease) ([facility])     Hyperlipidemia         Current Outpatient Medications   Medication Sig Dispense Refill    BREO ELLIPTA 200-25 MCG/ACT AEPB inhaler INHALE 1 PUFF BY MOUTH DAILY 1 each 5    meclizine (ANTIVERT) 12.5 MG tablet Take 1 tablet by mouth 3 times daily as needed      albuterol sulfate HFA (PROVENTIL;VENTOLIN;PROAIR) 108 (90 Base) MCG/ACT inhaler Inhale 2 puffs into the lungs every 4 hours as needed for Wheezing or Shortness of Breath 8.5 each 5    atorvastatin (LIPITOR) 80 MG tablet Take 1 tablet by mouth at bedtime 30 tablet 11    XARELTO 20 MG TABS tablet TAKE ONE TABLET BY MOUTH DAILY WITH BREAKFAST 30 tablet 5    clopidogrel (PLAVIX) 75 MG tablet Take 1 tablet by mouth daily 30 tablet 3    clopidogrel (PLAVIX) 75 MG tablet TAKE ONE TABLET BY MOUTH DAILY (Patient not taking: Reported on 11/30/2023) 90 tablet 3    XARELTO 20 MG TABS tablet TAKE ONE TABLET BY MOUTH DAILY WITH BREAKFAST (Patient not taking: Reported on 11/30/2023) 90 tablet 3     No current facility-administered medications for this visit. Review of Systems   Constitutional: Negative. Respiratory: Negative. Cardiovascular: Negative. Gastrointestinal: Negative. Genitourinary: Negative. Musculoskeletal: Negative. Skin:  Positive for rash (psoriasis improving). Neurological: Negative.     All other systems reviewed and are

## 2023-12-15 DIAGNOSIS — J44.9 CHRONIC OBSTRUCTIVE PULMONARY DISEASE, UNSPECIFIED COPD TYPE (HCC): ICD-10-CM

## 2023-12-15 DIAGNOSIS — E78.5 DYSLIPIDEMIA: ICD-10-CM

## 2023-12-15 DIAGNOSIS — J45.50 SEVERE PERSISTENT ASTHMA WITHOUT COMPLICATION: ICD-10-CM

## 2023-12-15 RX ORDER — ALBUTEROL SULFATE 90 UG/1
AEROSOL, METERED RESPIRATORY (INHALATION)
Qty: 18 G | Refills: 2 | Status: SHIPPED | OUTPATIENT
Start: 2023-12-15

## 2023-12-15 RX ORDER — ATORVASTATIN CALCIUM 80 MG/1
80 TABLET, FILM COATED ORAL NIGHTLY
Qty: 90 TABLET | Refills: 1 | Status: SHIPPED | OUTPATIENT
Start: 2023-12-15

## 2024-01-12 DIAGNOSIS — J45.50 SEVERE PERSISTENT ASTHMA WITHOUT COMPLICATION: ICD-10-CM

## 2024-01-12 DIAGNOSIS — J44.9 CHRONIC OBSTRUCTIVE PULMONARY DISEASE, UNSPECIFIED COPD TYPE (HCC): ICD-10-CM

## 2024-01-12 RX ORDER — FLUTICASONE FUROATE AND VILANTEROL 200; 25 UG/1; UG/1
POWDER RESPIRATORY (INHALATION)
Qty: 1 EACH | Refills: 5 | Status: SHIPPED | OUTPATIENT
Start: 2024-01-12

## 2024-01-12 NOTE — TELEPHONE ENCOUNTER
Patient's pharmacy has changed and he is needing a refill of Breo sent to [facility] on [address]. Pharmacy updated in chart.

## 2024-06-05 DIAGNOSIS — J45.50 SEVERE PERSISTENT ASTHMA WITHOUT COMPLICATION: ICD-10-CM

## 2024-06-05 DIAGNOSIS — J44.9 CHRONIC OBSTRUCTIVE PULMONARY DISEASE, UNSPECIFIED COPD TYPE (HCC): ICD-10-CM

## 2024-06-05 RX ORDER — FLUTICASONE FUROATE AND VILANTEROL 200; 25 UG/1; UG/1
POWDER RESPIRATORY (INHALATION)
Qty: 60 EACH | OUTPATIENT
Start: 2024-06-05

## 2024-07-06 DIAGNOSIS — J44.9 CHRONIC OBSTRUCTIVE PULMONARY DISEASE, UNSPECIFIED COPD TYPE (HCC): ICD-10-CM

## 2024-07-06 DIAGNOSIS — J45.50 SEVERE PERSISTENT ASTHMA WITHOUT COMPLICATION: ICD-10-CM

## 2024-07-08 RX ORDER — FLUTICASONE FUROATE AND VILANTEROL 200; 25 UG/1; UG/1
POWDER RESPIRATORY (INHALATION)
Qty: 60 EACH | Refills: 1 | Status: SHIPPED | OUTPATIENT
Start: 2024-07-08

## 2024-07-10 ENCOUNTER — TELEPHONE (OUTPATIENT)
Dept: INTERNAL MEDICINE CLINIC | Age: 54
End: 2024-07-10

## 2024-07-12 DIAGNOSIS — J45.50 SEVERE PERSISTENT ASTHMA WITHOUT COMPLICATION: ICD-10-CM

## 2024-07-12 DIAGNOSIS — J44.9 CHRONIC OBSTRUCTIVE PULMONARY DISEASE, UNSPECIFIED COPD TYPE (HCC): ICD-10-CM

## 2024-07-12 RX ORDER — ALBUTEROL SULFATE 90 UG/1
AEROSOL, METERED RESPIRATORY (INHALATION)
Qty: 18 G | Refills: 0 | Status: SHIPPED | OUTPATIENT
Start: 2024-07-12

## 2024-09-03 DIAGNOSIS — J45.50 SEVERE PERSISTENT ASTHMA WITHOUT COMPLICATION: ICD-10-CM

## 2024-09-03 DIAGNOSIS — J44.9 CHRONIC OBSTRUCTIVE PULMONARY DISEASE, UNSPECIFIED COPD TYPE (HCC): ICD-10-CM

## 2024-09-03 RX ORDER — ALBUTEROL SULFATE 90 UG/1
AEROSOL, METERED RESPIRATORY (INHALATION)
Qty: 8.5 G | Refills: 0 | Status: SHIPPED | OUTPATIENT
Start: 2024-09-03

## 2024-09-03 RX ORDER — FLUTICASONE FUROATE AND VILANTEROL 200; 25 UG/1; UG/1
POWDER RESPIRATORY (INHALATION)
Qty: 60 EACH | Refills: 1 | Status: SHIPPED | OUTPATIENT
Start: 2024-09-03

## 2024-10-09 RX ORDER — CLOPIDOGREL BISULFATE 75 MG/1
75 TABLET ORAL DAILY
Qty: 90 TABLET | Refills: 3 | Status: SHIPPED | OUTPATIENT
Start: 2024-10-09

## 2024-10-09 RX ORDER — RIVAROXABAN 20 MG/1
20 TABLET, FILM COATED ORAL DAILY
Qty: 90 TABLET | Refills: 3 | Status: SHIPPED | OUTPATIENT
Start: 2024-10-09

## 2024-10-29 DIAGNOSIS — J44.9 CHRONIC OBSTRUCTIVE PULMONARY DISEASE, UNSPECIFIED COPD TYPE (HCC): ICD-10-CM

## 2024-10-29 DIAGNOSIS — J45.50 SEVERE PERSISTENT ASTHMA WITHOUT COMPLICATION: ICD-10-CM

## 2024-10-29 RX ORDER — FLUTICASONE FUROATE AND VILANTEROL 200; 25 UG/1; UG/1
POWDER RESPIRATORY (INHALATION)
Qty: 60 EACH | Refills: 0 | Status: SHIPPED | OUTPATIENT
Start: 2024-10-29

## 2024-10-29 NOTE — TELEPHONE ENCOUNTER
Pended for fill. Patient needs an appointment for any further refills. [employer] message sent.     Medication:   Requested Prescriptions     Pending Prescriptions Disp Refills    fluticasone furoate-vilanterol (BREO ELLIPTA) 200-25 MCG/ACT AEPB inhaler [Pharmacy Med Name: BREO ELLIPTA 200-25MCG ORAL INH(30)] 60 each 0     Sig: INHALE 1 PUFF BY MOUTH DAILY        Last Filled:  09/03/24    Patient Phone Number: [phone] (home)     Last appt: 11/30/2023   Next appt: Visit date not found    Last OARRS:        No data to display

## 2024-11-28 DIAGNOSIS — J44.9 CHRONIC OBSTRUCTIVE PULMONARY DISEASE, UNSPECIFIED COPD TYPE (HCC): ICD-10-CM

## 2024-11-28 DIAGNOSIS — J45.50 SEVERE PERSISTENT ASTHMA WITHOUT COMPLICATION: ICD-10-CM

## 2024-11-29 RX ORDER — FLUTICASONE FUROATE AND VILANTEROL 200; 25 UG/1; UG/1
POWDER RESPIRATORY (INHALATION)
Qty: 60 EACH | Refills: 0 | OUTPATIENT
Start: 2024-11-29

## 2024-11-29 NOTE — TELEPHONE ENCOUNTER
Last OV: 11/30/2023  Next OV: Visit date not found    Next appointment due: 5/30/2024    Needs appointment for further refills

## 2024-12-03 ENCOUNTER — OFFICE VISIT (OUTPATIENT)
Dept: INTERNAL MEDICINE CLINIC | Age: 54
End: 2024-12-03

## 2024-12-03 VITALS
SYSTOLIC BLOOD PRESSURE: 138 MMHG | HEART RATE: 91 BPM | HEIGHT: 67 IN | OXYGEN SATURATION: 98 % | BODY MASS INDEX: 27.28 KG/M2 | WEIGHT: 173.8 LBS | DIASTOLIC BLOOD PRESSURE: 88 MMHG

## 2024-12-03 DIAGNOSIS — J44.9 CHRONIC OBSTRUCTIVE PULMONARY DISEASE, UNSPECIFIED COPD TYPE (HCC): ICD-10-CM

## 2024-12-03 DIAGNOSIS — R73.01 IMPAIRED FASTING GLUCOSE: ICD-10-CM

## 2024-12-03 DIAGNOSIS — I70.212 ATHEROSCLEROSIS OF NATIVE ARTERIES OF EXTREMITIES WITH INTERMITTENT CLAUDICATION, LEFT LEG (HCC): ICD-10-CM

## 2024-12-03 DIAGNOSIS — E78.5 DYSLIPIDEMIA: ICD-10-CM

## 2024-12-03 DIAGNOSIS — Z00.00 ANNUAL PHYSICAL EXAM: Primary | ICD-10-CM

## 2024-12-03 DIAGNOSIS — E55.9 VITAMIN D DEFICIENCY: ICD-10-CM

## 2024-12-03 DIAGNOSIS — J45.50 SEVERE PERSISTENT ASTHMA WITHOUT COMPLICATION: ICD-10-CM

## 2024-12-03 DIAGNOSIS — Z23 NEEDS FLU SHOT: ICD-10-CM

## 2024-12-03 RX ORDER — FLUTICASONE FUROATE AND VILANTEROL 200; 25 UG/1; UG/1
1 POWDER RESPIRATORY (INHALATION)
Qty: 180 EACH | Refills: 3 | Status: SHIPPED | OUTPATIENT
Start: 2024-12-03

## 2024-12-03 SDOH — ECONOMIC STABILITY: FOOD INSECURITY: WITHIN THE PAST 12 MONTHS, YOU WORRIED THAT YOUR FOOD WOULD RUN OUT BEFORE YOU GOT MONEY TO BUY MORE.: NEVER TRUE

## 2024-12-03 SDOH — ECONOMIC STABILITY: FOOD INSECURITY: WITHIN THE PAST 12 MONTHS, THE FOOD YOU BOUGHT JUST DIDN'T LAST AND YOU DIDN'T HAVE MONEY TO GET MORE.: NEVER TRUE

## 2024-12-03 SDOH — ECONOMIC STABILITY: INCOME INSECURITY: HOW HARD IS IT FOR YOU TO PAY FOR THE VERY BASICS LIKE FOOD, HOUSING, MEDICAL CARE, AND HEATING?: NOT HARD AT ALL

## 2024-12-03 ASSESSMENT — ENCOUNTER SYMPTOMS
SHORTNESS OF BREATH: 0
RESPIRATORY NEGATIVE: 1
GASTROINTESTINAL NEGATIVE: 1

## 2024-12-03 ASSESSMENT — PATIENT HEALTH QUESTIONNAIRE - PHQ9
SUM OF ALL RESPONSES TO PHQ9 QUESTIONS 1 & 2: 0
SUM OF ALL RESPONSES TO PHQ QUESTIONS 1-9: 0
SUM OF ALL RESPONSES TO PHQ QUESTIONS 1-9: 0
2. FEELING DOWN, DEPRESSED OR HOPELESS: NOT AT ALL
SUM OF ALL RESPONSES TO PHQ QUESTIONS 1-9: 0
SUM OF ALL RESPONSES TO PHQ QUESTIONS 1-9: 0
1. LITTLE INTEREST OR PLEASURE IN DOING THINGS: NOT AT ALL

## 2024-12-03 NOTE — PROGRESS NOTES
SUBJECTIVE:    Patient ID: Clinton Tirado is a 54 y.o. male.    CC: Annual exam, COPD, dyslipidemia    HPI: The patient presents the office today for annual physical examination and follow-up of chronic medical conditions.    Patient has been lost to follow-up for 1 year.  He states he forgot to come in because he has been busy fishing this summer.  He is enjoying [employer].    He has a history of COPD.  He reports that he is improved to smoking but continues to smoke \"a little bit.\"  He denies any recent exacerbation.  He takes Breo daily and uses albuterol as needed.  He feels this is a good regimen.    He has a history of dyslipidemia.  He denies any chest pain or shortness of breath.  He is seeing vascular for history of arthrosclerosis.  They have him on Plavix and Xarelto.      Past Medical History:   Diagnosis Date    Asthma     COPD (chronic obstructive pulmonary disease) ([facility])     Hyperlipidemia         Current Outpatient Medications   Medication Sig Dispense Refill    fluticasone furoate-vilanterol (BREO ELLIPTA) 200-25 MCG/ACT AEPB inhaler Inhale 1 puff into the lungs daily 180 each 3    rivaroxaban (XARELTO) 20 MG TABS tablet TAKE 1 TABLET BY MOUTH DAILY WITH BREAKFAST 90 tablet 3    clopidogrel (PLAVIX) 75 MG tablet TAKE 1 TABLET BY MOUTH DAILY 90 tablet 3    albuterol sulfate HFA (PROVENTIL;VENTOLIN;PROAIR) 108 (90 Base) MCG/ACT inhaler INHALE 2 PUFFS BY MOUTH EVERY 4 HOURS AS NEEDED FOR SHORTNESS OF BREATH OR WHEEZING 8.5 g 0    atorvastatin (LIPITOR) 80 MG tablet TAKE ONE TABLET BY MOUTH AT BEDTIME 90 tablet 1    meclizine (ANTIVERT) 12.5 MG tablet Take 1 tablet by mouth 3 times daily as needed       No current facility-administered medications for this visit.           Review of Systems   Constitutional: Negative.    Respiratory: Negative.  Negative for shortness of breath.    Cardiovascular: Negative.  Negative for chest pain.   Gastrointestinal: Negative.    Genitourinary: Negative.

## 2024-12-11 DIAGNOSIS — Z12.11 COLON CANCER SCREENING: Primary | ICD-10-CM

## 2024-12-28 DIAGNOSIS — J45.50 SEVERE PERSISTENT ASTHMA WITHOUT COMPLICATION (HCC): ICD-10-CM

## 2024-12-28 DIAGNOSIS — J45.50 SEVERE PERSISTENT ASTHMA WITHOUT COMPLICATION: ICD-10-CM

## 2024-12-28 DIAGNOSIS — J44.9 CHRONIC OBSTRUCTIVE PULMONARY DISEASE, UNSPECIFIED COPD TYPE (HCC): ICD-10-CM

## 2024-12-30 RX ORDER — ALBUTEROL SULFATE 90 UG/1
INHALANT RESPIRATORY (INHALATION)
Qty: 8.5 G | Refills: 0 | OUTPATIENT
Start: 2024-12-30

## 2024-12-30 RX ORDER — ALBUTEROL SULFATE 90 UG/1
INHALANT RESPIRATORY (INHALATION)
Qty: 18 G | Refills: 0 | Status: SHIPPED | OUTPATIENT
Start: 2024-12-30

## 2024-12-30 NOTE — TELEPHONE ENCOUNTER
Medication:   Requested Prescriptions     Pending Prescriptions Disp Refills    albuterol sulfate HFA (PROVENTIL;VENTOLIN;PROAIR) 108 (90 Base) MCG/ACT inhaler [Pharmacy Med Name: ALBUTEROL HFA INH (200 PUFFS) 8.5GM] 8.5 g 0     Sig: INHALE 2 PUFFS BY MOUTH EVERY 4 HOURS AS NEEDED FOR SHORTNESS OF BREATH OR WHEEZING        Last Filled:  09/03/24    Patient Phone Number: [phone] (home)     Last appt: 12/3/2024   Next appt: 6/3/2025    Last OARRS:        No data to display

## 2024-12-30 NOTE — TELEPHONE ENCOUNTER
Medication:   Requested Prescriptions     Pending Prescriptions Disp Refills    albuterol sulfate HFA (PROVENTIL;VENTOLIN;PROAIR) 108 (90 Base) MCG/ACT inhaler [Pharmacy Med Name: ALBUTEROL HFA INH (200 PUFFS) 18GM] 18 g      Sig: INHALE 2 PUFFS INTO THE LUNGS EVERY 4 HOURS AS NEEDED FOR WHEEZING OR SHORTNESS OF BREATH        Last Filled:  09/03/24    Patient Phone Number: [phone] (home)     Last appt: 12/3/2024   Next appt: 12/28/2024    Last OARRS:        No data to display

## 2025-02-14 ENCOUNTER — OFFICE VISIT (OUTPATIENT)
Dept: INTERNAL MEDICINE CLINIC | Age: 55
End: 2025-02-14
Payer: COMMERCIAL

## 2025-02-14 VITALS
BODY MASS INDEX: 27.65 KG/M2 | DIASTOLIC BLOOD PRESSURE: 80 MMHG | TEMPERATURE: 97.7 F | OXYGEN SATURATION: 94 % | SYSTOLIC BLOOD PRESSURE: 126 MMHG | HEART RATE: 91 BPM | WEIGHT: 176.2 LBS | HEIGHT: 67 IN

## 2025-02-14 DIAGNOSIS — K04.7 DENTAL ABSCESS: Primary | ICD-10-CM

## 2025-02-14 PROCEDURE — 3017F COLORECTAL CA SCREEN DOC REV: CPT | Performed by: NURSE PRACTITIONER

## 2025-02-14 PROCEDURE — G8419 CALC BMI OUT NRM PARAM NOF/U: HCPCS | Performed by: NURSE PRACTITIONER

## 2025-02-14 PROCEDURE — 99213 OFFICE O/P EST LOW 20 MIN: CPT | Performed by: NURSE PRACTITIONER

## 2025-02-14 PROCEDURE — G8427 DOCREV CUR MEDS BY ELIG CLIN: HCPCS | Performed by: NURSE PRACTITIONER

## 2025-02-14 PROCEDURE — 4004F PT TOBACCO SCREEN RCVD TLK: CPT | Performed by: NURSE PRACTITIONER

## 2025-02-14 SDOH — ECONOMIC STABILITY: FOOD INSECURITY: WITHIN THE PAST 12 MONTHS, THE FOOD YOU BOUGHT JUST DIDN'T LAST AND YOU DIDN'T HAVE MONEY TO GET MORE.: NEVER TRUE

## 2025-02-14 SDOH — ECONOMIC STABILITY: FOOD INSECURITY: WITHIN THE PAST 12 MONTHS, YOU WORRIED THAT YOUR FOOD WOULD RUN OUT BEFORE YOU GOT MONEY TO BUY MORE.: NEVER TRUE

## 2025-02-14 ASSESSMENT — PATIENT HEALTH QUESTIONNAIRE - PHQ9
SUM OF ALL RESPONSES TO PHQ QUESTIONS 1-9: 0
SUM OF ALL RESPONSES TO PHQ QUESTIONS 1-9: 0
1. LITTLE INTEREST OR PLEASURE IN DOING THINGS: NOT AT ALL
SUM OF ALL RESPONSES TO PHQ QUESTIONS 1-9: 0
SUM OF ALL RESPONSES TO PHQ9 QUESTIONS 1 & 2: 0
SUM OF ALL RESPONSES TO PHQ QUESTIONS 1-9: 0
2. FEELING DOWN, DEPRESSED OR HOPELESS: NOT AT ALL

## 2025-02-14 ASSESSMENT — ENCOUNTER SYMPTOMS
SORE THROAT: 0
SHORTNESS OF BREATH: 0
TROUBLE SWALLOWING: 0
FACIAL SWELLING: 1

## 2025-02-14 NOTE — PROGRESS NOTES
premolars cracked left lower side.  Left lower jaw swollen.  Cardiovascular:      Rate and Rhythm: Normal rate.   Pulmonary:      Effort: Pulmonary effort is normal.   Neurological:      Mental Status: He is alert and oriented to person, place, and time.   Psychiatric:         Mood and Affect: Mood normal.         Behavior: Behavior normal.     All questions answered. Patient stated no further questions or concerns at this time.     Electronically signed by ORLANDO Conn NP on 2/14/2025 at 1:14 PM.

## 2025-02-14 NOTE — ASSESSMENT & PLAN NOTE
-  Acute, new problem  -  Patient with left lower jaw swelling with slight pain.    -  Start Augmentin 875 mg twice a day x 10 days  -  Tylenol 1000 mg every 6 hours as needed for pain  -   Patient advised to schedule to see dentist as soon as possible.    -   Continue salt water gargles

## 2025-04-04 DIAGNOSIS — J44.9 CHRONIC OBSTRUCTIVE PULMONARY DISEASE, UNSPECIFIED COPD TYPE (HCC): ICD-10-CM

## 2025-04-04 DIAGNOSIS — J45.50 SEVERE PERSISTENT ASTHMA WITHOUT COMPLICATION (HCC): ICD-10-CM

## 2025-04-04 RX ORDER — ALBUTEROL SULFATE 90 UG/1
2 INHALANT RESPIRATORY (INHALATION) EVERY 4 HOURS PRN
Qty: 8.5 G | Refills: 2 | Status: SHIPPED | OUTPATIENT
Start: 2025-04-04

## 2025-07-26 DIAGNOSIS — I70.212 ATHEROSCLEROSIS OF NATIVE ARTERIES OF EXTREMITIES WITH INTERMITTENT CLAUDICATION, LEFT LEG: Primary | ICD-10-CM

## 2025-07-28 RX ORDER — RIVAROXABAN 20 MG/1
20 TABLET, FILM COATED ORAL DAILY
Qty: 90 TABLET | Refills: 3 | Status: SHIPPED | OUTPATIENT
Start: 2025-07-28

## 2025-07-28 RX ORDER — CLOPIDOGREL BISULFATE 75 MG/1
75 TABLET ORAL DAILY
Qty: 90 TABLET | Refills: 3 | Status: SHIPPED | OUTPATIENT
Start: 2025-07-28

## 2025-08-26 DIAGNOSIS — J45.50 SEVERE PERSISTENT ASTHMA WITHOUT COMPLICATION (HCC): ICD-10-CM

## 2025-08-26 DIAGNOSIS — J44.9 CHRONIC OBSTRUCTIVE PULMONARY DISEASE, UNSPECIFIED COPD TYPE (HCC): ICD-10-CM

## 2025-08-26 RX ORDER — ALBUTEROL SULFATE 90 UG/1
2 INHALANT RESPIRATORY (INHALATION) EVERY 4 HOURS PRN
Qty: 8.5 G | Refills: 2 | Status: SHIPPED | OUTPATIENT
Start: 2025-08-26